# Patient Record
Sex: FEMALE | Race: BLACK OR AFRICAN AMERICAN | Employment: FULL TIME | URBAN - METROPOLITAN AREA
[De-identification: names, ages, dates, MRNs, and addresses within clinical notes are randomized per-mention and may not be internally consistent; named-entity substitution may affect disease eponyms.]

---

## 2019-08-09 ENCOUNTER — APPOINTMENT (OUTPATIENT)
Dept: CT IMAGING | Age: 49
DRG: 065 | End: 2019-08-09
Payer: COMMERCIAL

## 2019-08-09 ENCOUNTER — HOSPITAL ENCOUNTER (INPATIENT)
Age: 49
LOS: 4 days | Discharge: NURSING, REHAB OR LONG-TERM CARE | DRG: 065 | End: 2019-08-13
Attending: EMERGENCY MEDICINE | Admitting: INTERNAL MEDICINE
Payer: COMMERCIAL

## 2019-08-09 DIAGNOSIS — I63.9 CEREBROVASCULAR ACCIDENT (CVA), UNSPECIFIED MECHANISM (HCC): Primary | ICD-10-CM

## 2019-08-09 LAB
ANION GAP SERPL CALCULATED.3IONS-SCNC: 16 MMOL/L (ref 3–16)
APTT: 32.2 SEC (ref 26–36)
BASOPHILS ABSOLUTE: 0 K/UL (ref 0–0.2)
BASOPHILS RELATIVE PERCENT: 0.5 %
BUN BLDV-MCNC: 8 MG/DL (ref 7–20)
CALCIUM SERPL-MCNC: 9.4 MG/DL (ref 8.3–10.6)
CHLORIDE BLD-SCNC: 98 MMOL/L (ref 99–110)
CHP ED QC CHECK: YES
CO2: 22 MMOL/L (ref 21–32)
CREAT SERPL-MCNC: 0.7 MG/DL (ref 0.6–1.1)
EOSINOPHILS ABSOLUTE: 0.1 K/UL (ref 0–0.6)
EOSINOPHILS RELATIVE PERCENT: 1.2 %
GFR AFRICAN AMERICAN: >60
GFR NON-AFRICAN AMERICAN: >60
GLUCOSE BLD-MCNC: 140 MG/DL (ref 70–99)
GLUCOSE BLD-MCNC: 168 MG/DL (ref 70–99)
GONADOTROPIN, CHORIONIC (HCG) QUANT: <5 MIU/ML
HCT VFR BLD CALC: 39.3 % (ref 36–48)
HEMOGLOBIN: 12.4 G/DL (ref 12–16)
INR BLD: 1.02 (ref 0.86–1.14)
LYMPHOCYTES ABSOLUTE: 1 K/UL (ref 1–5.1)
LYMPHOCYTES RELATIVE PERCENT: 14 %
MCH RBC QN AUTO: 25.3 PG (ref 26–34)
MCHC RBC AUTO-ENTMCNC: 31.6 G/DL (ref 31–36)
MCV RBC AUTO: 79.9 FL (ref 80–100)
MONOCYTES ABSOLUTE: 0.3 K/UL (ref 0–1.3)
MONOCYTES RELATIVE PERCENT: 4.6 %
NEUTROPHILS ABSOLUTE: 5.8 K/UL (ref 1.7–7.7)
NEUTROPHILS RELATIVE PERCENT: 79.7 %
PDW BLD-RTO: 16.8 % (ref 12.4–15.4)
PERFORMED ON: ABNORMAL
PLATELET # BLD: 191 K/UL (ref 135–450)
PMV BLD AUTO: 8.1 FL (ref 5–10.5)
POTASSIUM SERPL-SCNC: 3.4 MMOL/L (ref 3.5–5.1)
PROTHROMBIN TIME: 11.6 SEC (ref 9.8–13)
RBC # BLD: 4.92 M/UL (ref 4–5.2)
SODIUM BLD-SCNC: 136 MMOL/L (ref 136–145)
WBC # BLD: 7.3 K/UL (ref 4–11)

## 2019-08-09 PROCEDURE — 36415 COLL VENOUS BLD VENIPUNCTURE: CPT

## 2019-08-09 PROCEDURE — 2060000000 HC ICU INTERMEDIATE R&B

## 2019-08-09 PROCEDURE — 80048 BASIC METABOLIC PNL TOTAL CA: CPT

## 2019-08-09 PROCEDURE — 93005 ELECTROCARDIOGRAM TRACING: CPT | Performed by: EMERGENCY MEDICINE

## 2019-08-09 PROCEDURE — 85610 PROTHROMBIN TIME: CPT

## 2019-08-09 PROCEDURE — 84702 CHORIONIC GONADOTROPIN TEST: CPT

## 2019-08-09 PROCEDURE — 6360000002 HC RX W HCPCS: Performed by: EMERGENCY MEDICINE

## 2019-08-09 PROCEDURE — 70450 CT HEAD/BRAIN W/O DYE: CPT

## 2019-08-09 PROCEDURE — 6370000000 HC RX 637 (ALT 250 FOR IP): Performed by: INTERNAL MEDICINE

## 2019-08-09 PROCEDURE — 96374 THER/PROPH/DIAG INJ IV PUSH: CPT

## 2019-08-09 PROCEDURE — 85730 THROMBOPLASTIN TIME PARTIAL: CPT

## 2019-08-09 PROCEDURE — 2580000003 HC RX 258: Performed by: INTERNAL MEDICINE

## 2019-08-09 PROCEDURE — 6370000000 HC RX 637 (ALT 250 FOR IP): Performed by: EMERGENCY MEDICINE

## 2019-08-09 PROCEDURE — 96375 TX/PRO/DX INJ NEW DRUG ADDON: CPT

## 2019-08-09 PROCEDURE — 2500000003 HC RX 250 WO HCPCS: Performed by: EMERGENCY MEDICINE

## 2019-08-09 PROCEDURE — 70496 CT ANGIOGRAPHY HEAD: CPT

## 2019-08-09 PROCEDURE — 96376 TX/PRO/DX INJ SAME DRUG ADON: CPT

## 2019-08-09 PROCEDURE — 85025 COMPLETE CBC W/AUTO DIFF WBC: CPT

## 2019-08-09 PROCEDURE — 2500000003 HC RX 250 WO HCPCS: Performed by: INTERNAL MEDICINE

## 2019-08-09 PROCEDURE — 6360000004 HC RX CONTRAST MEDICATION: Performed by: EMERGENCY MEDICINE

## 2019-08-09 PROCEDURE — 99285 EMERGENCY DEPT VISIT HI MDM: CPT

## 2019-08-09 RX ORDER — LABETALOL HYDROCHLORIDE 5 MG/ML
2.5 INJECTION, SOLUTION INTRAVENOUS ONCE
Status: COMPLETED | OUTPATIENT
Start: 2019-08-09 | End: 2019-08-09

## 2019-08-09 RX ORDER — ASPIRIN 600 MG/1
300 SUPPOSITORY RECTAL ONCE
Status: COMPLETED | OUTPATIENT
Start: 2019-08-09 | End: 2019-08-09

## 2019-08-09 RX ORDER — ATORVASTATIN CALCIUM 80 MG/1
80 TABLET, FILM COATED ORAL NIGHTLY
Status: DISCONTINUED | OUTPATIENT
Start: 2019-08-09 | End: 2019-08-13 | Stop reason: HOSPADM

## 2019-08-09 RX ORDER — ASPIRIN 81 MG/1
324 TABLET, CHEWABLE ORAL ONCE
Status: DISCONTINUED | OUTPATIENT
Start: 2019-08-09 | End: 2019-08-09

## 2019-08-09 RX ORDER — LABETALOL HYDROCHLORIDE 5 MG/ML
10 INJECTION, SOLUTION INTRAVENOUS EVERY 10 MIN PRN
Status: DISCONTINUED | OUTPATIENT
Start: 2019-08-09 | End: 2019-08-13 | Stop reason: HOSPADM

## 2019-08-09 RX ORDER — ASPIRIN 300 MG/1
300 SUPPOSITORY RECTAL DAILY
Status: DISCONTINUED | OUTPATIENT
Start: 2019-08-10 | End: 2019-08-13 | Stop reason: HOSPADM

## 2019-08-09 RX ORDER — ASPIRIN 81 MG/1
81 TABLET ORAL DAILY
Status: DISCONTINUED | OUTPATIENT
Start: 2019-08-10 | End: 2019-08-13 | Stop reason: HOSPADM

## 2019-08-09 RX ORDER — SODIUM CHLORIDE 0.9 % (FLUSH) 0.9 %
10 SYRINGE (ML) INJECTION EVERY 12 HOURS SCHEDULED
Status: DISCONTINUED | OUTPATIENT
Start: 2019-08-09 | End: 2019-08-13 | Stop reason: HOSPADM

## 2019-08-09 RX ORDER — SODIUM CHLORIDE 0.9 % (FLUSH) 0.9 %
10 SYRINGE (ML) INJECTION PRN
Status: DISCONTINUED | OUTPATIENT
Start: 2019-08-09 | End: 2019-08-13 | Stop reason: HOSPADM

## 2019-08-09 RX ORDER — METHYLPREDNISOLONE SODIUM SUCCINATE 125 MG/2ML
125 INJECTION, POWDER, LYOPHILIZED, FOR SOLUTION INTRAMUSCULAR; INTRAVENOUS ONCE
Status: COMPLETED | OUTPATIENT
Start: 2019-08-09 | End: 2019-08-09

## 2019-08-09 RX ORDER — DIPHENHYDRAMINE HYDROCHLORIDE 50 MG/ML
50 INJECTION INTRAMUSCULAR; INTRAVENOUS ONCE
Status: COMPLETED | OUTPATIENT
Start: 2019-08-09 | End: 2019-08-09

## 2019-08-09 RX ORDER — ONDANSETRON 2 MG/ML
4 INJECTION INTRAMUSCULAR; INTRAVENOUS EVERY 6 HOURS PRN
Status: DISCONTINUED | OUTPATIENT
Start: 2019-08-09 | End: 2019-08-13 | Stop reason: HOSPADM

## 2019-08-09 RX ADMIN — ATORVASTATIN CALCIUM 80 MG: 80 TABLET, FILM COATED ORAL at 22:21

## 2019-08-09 RX ADMIN — SODIUM CHLORIDE, PRESERVATIVE FREE 10 ML: 5 INJECTION INTRAVENOUS at 21:48

## 2019-08-09 RX ADMIN — LABETALOL HYDROCHLORIDE 10 MG: 5 INJECTION INTRAVENOUS at 21:46

## 2019-08-09 RX ADMIN — DIPHENHYDRAMINE HYDROCHLORIDE 50 MG: 50 INJECTION, SOLUTION INTRAMUSCULAR; INTRAVENOUS at 19:30

## 2019-08-09 RX ADMIN — ASPIRIN 300 MG: 600 SUPPOSITORY RECTAL at 20:19

## 2019-08-09 RX ADMIN — LABETALOL HYDROCHLORIDE 10 MG: 5 INJECTION INTRAVENOUS at 22:39

## 2019-08-09 RX ADMIN — Medication 20 MG: at 19:30

## 2019-08-09 RX ADMIN — LABETALOL HYDROCHLORIDE 2.5 MG: 5 INJECTION, SOLUTION INTRAVENOUS at 20:59

## 2019-08-09 RX ADMIN — METHYLPREDNISOLONE SODIUM SUCCINATE 125 MG: 125 INJECTION, POWDER, FOR SOLUTION INTRAMUSCULAR; INTRAVENOUS at 19:30

## 2019-08-09 RX ADMIN — IOPAMIDOL 75 ML: 755 INJECTION, SOLUTION INTRAVENOUS at 19:39

## 2019-08-09 RX ADMIN — LABETALOL HYDROCHLORIDE 2.5 MG: 5 INJECTION, SOLUTION INTRAVENOUS at 20:20

## 2019-08-09 ASSESSMENT — PAIN SCALES - GENERAL
PAINLEVEL_OUTOF10: 0

## 2019-08-09 NOTE — ED PROVIDER NOTES
lateral ventricle and communicating with  the suprasellar cistern, compatible with either remote ischemia or with  congenital porencephaly.  No acute loss of the gray-white matter  differentiation is identified to suggest acute or subacute infarct.  No  masses or hemorrhages within the brain parenchyma are found. No evidence of midline shift.  Cavum septum pellucidum et vergae noted.  The  intracranial vasculature, including the dural venous sinuses, is within  normal limits. ORBITS: The visualized portion of the orbits demonstrate no acute abnormality. SINUSES: The visualized paranasal sinuses and mastoid air cells demonstrate  no acute abnormality. SOFT TISSUES/SKULL:  No acute abnormality of the visualized skull or soft  tissues.                      EKG (if obtained): (All EKG's are interpreted by myself in the absence of a cardiologist)  Initial EKG on my interpretation shows normal sinus rhythm with PVCs at a rate of 87 bpm, no CADE    MDM:  Differential diagnosis: CVA, TIA, hypertensive emergency    The patient's blood glucose is normal.  She is markedly hypertensive with a BP over 226 systolic. I discussed the case with  Stroke Dr. Tiffanie Lomas who states that she is not a TPA candidate as she arrives almost 9 hours from time last confirmed to be normal which was when she went to bed 9 AM.    The patient is allergic to shellfish and initially refused IV contrast however as I told her the initial noncontrast CT did not show a bleed and would like to further look into possibility of a stroke she agreed to undergo such after being pretreated with Pepcid, Benadryl, Solu-Medrol after discussion regarding the risk/benefit of contrast in terms of allergy and to renal function. CT and CTA showed no acute abnormality. The patient was given per rectal aspirin along with started on IV labetalol, as her blood pressures over 200, and will admit here as there is no indication for UC transfer.   Patient is

## 2019-08-10 ENCOUNTER — APPOINTMENT (OUTPATIENT)
Dept: MRI IMAGING | Age: 49
DRG: 065 | End: 2019-08-10
Payer: COMMERCIAL

## 2019-08-10 LAB
CHOLESTEROL, TOTAL: 280 MG/DL (ref 0–199)
EKG ATRIAL RATE: 87 BPM
EKG DIAGNOSIS: NORMAL
EKG P AXIS: 54 DEGREES
EKG P-R INTERVAL: 164 MS
EKG Q-T INTERVAL: 400 MS
EKG QRS DURATION: 102 MS
EKG QTC CALCULATION (BAZETT): 481 MS
EKG R AXIS: -17 DEGREES
EKG T AXIS: 18 DEGREES
EKG VENTRICULAR RATE: 87 BPM
ESTIMATED AVERAGE GLUCOSE: 116.9 MG/DL
HBA1C MFR BLD: 5.7 %
HCT VFR BLD CALC: 38.4 % (ref 36–48)
HDLC SERPL-MCNC: 74 MG/DL (ref 40–60)
HEMOGLOBIN: 12.6 G/DL (ref 12–16)
LDL CHOLESTEROL CALCULATED: 195 MG/DL
MCH RBC QN AUTO: 25.4 PG (ref 26–34)
MCHC RBC AUTO-ENTMCNC: 32.9 G/DL (ref 31–36)
MCV RBC AUTO: 77.2 FL (ref 80–100)
PDW BLD-RTO: 17 % (ref 12.4–15.4)
PLATELET # BLD: 308 K/UL (ref 135–450)
PMV BLD AUTO: 8.3 FL (ref 5–10.5)
RBC # BLD: 4.97 M/UL (ref 4–5.2)
TRIGL SERPL-MCNC: 55 MG/DL (ref 0–150)
VLDLC SERPL CALC-MCNC: 11 MG/DL
WBC # BLD: 6.4 K/UL (ref 4–11)

## 2019-08-10 PROCEDURE — 85027 COMPLETE CBC AUTOMATED: CPT

## 2019-08-10 PROCEDURE — 70551 MRI BRAIN STEM W/O DYE: CPT

## 2019-08-10 PROCEDURE — 6370000000 HC RX 637 (ALT 250 FOR IP): Performed by: NURSE PRACTITIONER

## 2019-08-10 PROCEDURE — 36415 COLL VENOUS BLD VENIPUNCTURE: CPT

## 2019-08-10 PROCEDURE — 80061 LIPID PANEL: CPT

## 2019-08-10 PROCEDURE — 93010 ELECTROCARDIOGRAM REPORT: CPT | Performed by: INTERNAL MEDICINE

## 2019-08-10 PROCEDURE — 94760 N-INVAS EAR/PLS OXIMETRY 1: CPT

## 2019-08-10 PROCEDURE — 6360000002 HC RX W HCPCS: Performed by: INTERNAL MEDICINE

## 2019-08-10 PROCEDURE — 97166 OT EVAL MOD COMPLEX 45 MIN: CPT

## 2019-08-10 PROCEDURE — 97530 THERAPEUTIC ACTIVITIES: CPT

## 2019-08-10 PROCEDURE — 83036 HEMOGLOBIN GLYCOSYLATED A1C: CPT

## 2019-08-10 PROCEDURE — 2580000003 HC RX 258: Performed by: INTERNAL MEDICINE

## 2019-08-10 PROCEDURE — 2500000003 HC RX 250 WO HCPCS: Performed by: INTERNAL MEDICINE

## 2019-08-10 PROCEDURE — 6370000000 HC RX 637 (ALT 250 FOR IP): Performed by: INTERNAL MEDICINE

## 2019-08-10 PROCEDURE — 97535 SELF CARE MNGMENT TRAINING: CPT

## 2019-08-10 PROCEDURE — 97163 PT EVAL HIGH COMPLEX 45 MIN: CPT | Performed by: PHYSICAL THERAPIST

## 2019-08-10 PROCEDURE — 97530 THERAPEUTIC ACTIVITIES: CPT | Performed by: PHYSICAL THERAPIST

## 2019-08-10 PROCEDURE — 2060000000 HC ICU INTERMEDIATE R&B

## 2019-08-10 RX ORDER — CLOPIDOGREL BISULFATE 75 MG/1
75 TABLET ORAL DAILY
Status: DISCONTINUED | OUTPATIENT
Start: 2019-08-10 | End: 2019-08-13 | Stop reason: HOSPADM

## 2019-08-10 RX ORDER — LORAZEPAM 1 MG/1
1 TABLET ORAL ONCE
Status: COMPLETED | OUTPATIENT
Start: 2019-08-10 | End: 2019-08-10

## 2019-08-10 RX ADMIN — SODIUM CHLORIDE, PRESERVATIVE FREE 10 ML: 5 INJECTION INTRAVENOUS at 08:14

## 2019-08-10 RX ADMIN — LORAZEPAM 1 MG: 1 TABLET ORAL at 16:11

## 2019-08-10 RX ADMIN — CLOPIDOGREL BISULFATE 75 MG: 75 TABLET ORAL at 12:50

## 2019-08-10 RX ADMIN — ASPIRIN 81 MG: 81 TABLET ORAL at 08:14

## 2019-08-10 RX ADMIN — LABETALOL HYDROCHLORIDE 10 MG: 5 INJECTION INTRAVENOUS at 08:14

## 2019-08-10 RX ADMIN — LABETALOL HYDROCHLORIDE 10 MG: 5 INJECTION INTRAVENOUS at 04:32

## 2019-08-10 RX ADMIN — ATORVASTATIN CALCIUM 80 MG: 80 TABLET, FILM COATED ORAL at 21:38

## 2019-08-10 RX ADMIN — ENOXAPARIN SODIUM 40 MG: 40 INJECTION SUBCUTANEOUS at 08:13

## 2019-08-10 RX ADMIN — SODIUM CHLORIDE, PRESERVATIVE FREE 10 ML: 5 INJECTION INTRAVENOUS at 21:38

## 2019-08-10 ASSESSMENT — PAIN SCALES - GENERAL: PAINLEVEL_OUTOF10: 0

## 2019-08-10 NOTE — PROGRESS NOTES
Physical Therapy    Facility/Department: [facility] PROGRESSIVE CARE  Initial Assessment    NAME: Piotr Sutton  : 1970  MRN: [id number]    Date of Service: 8/10/2019    Discharge Recommendations:  Patient would benefit from continued therapy after discharge, 5-7 sessions per week   PT Equipment Recommendations  Other: will continue to assess  Piotr Sutton scored a 9/24 on the AM-PAC short mobility form. Current research shows that an AM-PAC score of 17 or less is typically not associated with a discharge to the patient's home setting. Based on the patients AM-PAC score and their current functional mobility deficits, it is recommended that the patient have 5-7 sessions per week of Physical Therapy at d/c to increase the patients independence. Assessment   Body structures, Functions, Activity limitations: Decreased functional mobility   Assessment: pt is a 49 yo female who was adm to hosp with L sided weakness/parathesia; MRI pending; pt is well below her baseline of being Ind without an AD and working full time; pt currently is needing mod A of 2 for mobility tasks and needing pedrito stedy lift for transfers; feel pt will need extensive therapy at discharge to address her deficits to allow pt to return home at discharge  Prognosis: Fair;Good  Decision Making: High Complexity  PT Education: PT Role;General Safety  Barriers to Learning: very impulsive  REQUIRES PT FOLLOW UP: Yes  Activity Tolerance  Activity Tolerance: Patient Tolerated treatment well       Patient Diagnosis(es): The encounter diagnosis was Cerebrovascular accident (CVA), unspecified mechanism ([facility] [address] has a past medical history of Hypertension. has a past surgical history that includes  section.     Restrictions  Restrictions/Precautions  Restrictions/Precautions: Fall Risk  Position Activity Restriction  Other position/activity restrictions: pt very impulsive  Vision/Hearing  Vision: Within Functional Limits  Hearing: Within

## 2019-08-10 NOTE — PROGRESS NOTES
apparent distress. Laying flat  HEENT: Normocephalic. Atraumatic. Pupils equal and reactive. EOM intact. Oral mucosa pink/moist/intact. Left sided neglect  Neck: Supple. Symmetrical. Trachea midline. Lungs: Clear to auscultation bilaterally. Respirations even and unlabored. Chest: Exam unremarkable. Cardiac: S1/S2 noted. Regular Rhythm and rate. Abdomen/GI: Soft. Non-tender. Non-distended. BS+. Extremities: PP+. Atraumatic. No redness/cyanosis/edema noted. Brisk cap refill. Skin: Dry and intact. No lesions noted. Neuro: Grossly intact. No focal deficits noted. NIH 9, weakness in the RUE, 4/5 strength in RLE    LABS:    Lab Results   Component Value Date    WBC 6.4 08/10/2019    HGB 12.6 08/10/2019    HCT 38.4 08/10/2019    MCV 77.2 (L) 08/10/2019     08/10/2019    LYMPHOPCT 14.0 08/09/2019    RBC 4.97 08/10/2019    MCH 25.4 (L) 08/10/2019    MCHC 32.9 08/10/2019    RDW 17.0 (H) 08/10/2019       Lab Results   Component Value Date    CREATININE 0.7 08/09/2019    BUN 8 08/09/2019     08/09/2019    K 3.4 (L) 08/09/2019    CL 98 (L) 08/09/2019    CO2 22 08/09/2019       No results found for: MG    No results found for: ALT, AST, GGT, ALKPHOS, BILITOT     No flowsheet data found. Imaging:  MRI BRAIN WO CONTRAST   Final Result   Acute infarct within the right mid corona radiata. The findings were sent to the Radiology Results [address] at 5:07   pm on 8/10/2019to be communicated to a licensed caregiver. CTA HEAD NECK W CONTRAST   Final Result   Mild plaque both proximal ICAs. Otherwise no hemodynamic stenosis or   occlusion identified per NASCET criteria. No evidence of hemodynamically significant stenosis or occlusion of   intracranial circulation. CT HEAD WO CONTRAST   Final Result   No acute intracranial abnormality. Findings were discussed with Carlyle Pete at 7:20 pm on 8/9/2019.              Assessment & Plan:      Acute infarct within the right

## 2019-08-11 PROCEDURE — 2060000000 HC ICU INTERMEDIATE R&B

## 2019-08-11 PROCEDURE — 6360000002 HC RX W HCPCS: Performed by: INTERNAL MEDICINE

## 2019-08-11 PROCEDURE — 2500000003 HC RX 250 WO HCPCS: Performed by: NURSE PRACTITIONER

## 2019-08-11 PROCEDURE — 6370000000 HC RX 637 (ALT 250 FOR IP): Performed by: INTERNAL MEDICINE

## 2019-08-11 PROCEDURE — 2580000003 HC RX 258: Performed by: INTERNAL MEDICINE

## 2019-08-11 PROCEDURE — 6370000000 HC RX 637 (ALT 250 FOR IP): Performed by: NURSE PRACTITIONER

## 2019-08-11 PROCEDURE — 94760 N-INVAS EAR/PLS OXIMETRY 1: CPT

## 2019-08-11 PROCEDURE — 92610 EVALUATE SWALLOWING FUNCTION: CPT

## 2019-08-11 RX ORDER — ACETAMINOPHEN 325 MG/1
650 TABLET ORAL EVERY 4 HOURS PRN
Status: DISCONTINUED | OUTPATIENT
Start: 2019-08-11 | End: 2019-08-13 | Stop reason: HOSPADM

## 2019-08-11 RX ORDER — AMLODIPINE BESYLATE 5 MG/1
5 TABLET ORAL DAILY
Status: DISCONTINUED | OUTPATIENT
Start: 2019-08-11 | End: 2019-08-13 | Stop reason: HOSPADM

## 2019-08-11 RX ADMIN — AMLODIPINE BESYLATE 5 MG: 5 TABLET ORAL at 17:44

## 2019-08-11 RX ADMIN — SODIUM CHLORIDE, PRESERVATIVE FREE 10 ML: 5 INJECTION INTRAVENOUS at 10:16

## 2019-08-11 RX ADMIN — SODIUM CHLORIDE, PRESERVATIVE FREE 10 ML: 5 INJECTION INTRAVENOUS at 20:20

## 2019-08-11 RX ADMIN — LABETALOL HYDROCHLORIDE 10 MG: 5 INJECTION INTRAVENOUS at 15:03

## 2019-08-11 RX ADMIN — ATORVASTATIN CALCIUM 80 MG: 80 TABLET, FILM COATED ORAL at 20:19

## 2019-08-11 RX ADMIN — ENOXAPARIN SODIUM 40 MG: 40 INJECTION SUBCUTANEOUS at 10:16

## 2019-08-11 RX ADMIN — LABETALOL HYDROCHLORIDE 10 MG: 5 INJECTION INTRAVENOUS at 13:31

## 2019-08-11 RX ADMIN — ASPIRIN 81 MG: 81 TABLET ORAL at 10:16

## 2019-08-11 RX ADMIN — CLOPIDOGREL BISULFATE 75 MG: 75 TABLET ORAL at 10:16

## 2019-08-11 RX ADMIN — LABETALOL HYDROCHLORIDE 10 MG: 5 INJECTION INTRAVENOUS at 04:54

## 2019-08-11 NOTE — PROGRESS NOTES
Hospital Medicine Progress Note      Admit Date: 2019         Overnight Events: none    CC: F/U for left sided weakness and dizziness    HPI:   This is a 42-year-old female with a history of hypertension presented to the ED with complaints of left-sided weakness and facial droop. CT head performed and negative. CTA without acute findings. MRI positive for Acute infarct within the right mid corona radiata. She is clear signs of left-sided neglect and difficulty moving her left arm. Neurology was consulted. Interval History/Subjective:   Planes of feeling very fatigued, denies fevers, chills, chest pain or shortness of breath. No nausea or vomiting. No dysuria. No other symptoms noted at present. Review of Systems:     Comprehensive ROS negative except as mentioned above. Past Medical History:        Diagnosis Date    Hypertension        Past Surgical History:        Procedure Laterality Date     SECTION      x3       Allergies:  Shellfish-derived products; Cephalexin; Codeine; Erythromycin; and Pcn [penicillins]    Past medical and surgical history reviewed. Any changes have been noted.      Scheduled and prn Medications:    Scheduled Meds:   amLODIPine  5 mg Oral Daily    clopidogrel  75 mg Oral Daily    sodium chloride flush  10 mL Intravenous 2 times per day    enoxaparin  40 mg Subcutaneous Daily    aspirin  81 mg Oral Daily    Or    aspirin  300 mg Rectal Daily    atorvastatin  80 mg Oral Nightly     Continuous Infusions:  PRN Meds:.sodium chloride flush, magnesium hydroxide, ondansetron, labetalol    PHYSICAL EXAM:  BP (!) 181/104   Pulse 61   Temp 98.4 °F (36.9 °C) (Oral)   Resp 17   Ht 5' 7\" (1.702 m)   Wt 233 lb 4 oz (105.8 kg)   LMP 2019   SpO2 96%   BMI 36.53 kg/m²       Intake/Output Summary (Last 24 hours) at 2019 6134  Last data filed at 2019 0445  Gross per 24 hour   Intake --   Output 250 ml   Net -250 ml       General: Alert and

## 2019-08-11 NOTE — PROGRESS NOTES
Speech Language Pathology  Facility/Department: [address] PROGRESSIVE CARE   CLINICAL BEDSIDE SWALLOW EVALUATION    NAME: Bull Rosenberg  : 1970  MRN: [id number]    ADMISSION DATE: 2019  ADMITTING DIAGNOSIS: has Acute ischemic stroke [facility]) on their problem list.  ONSET DATE: 2019    Recent Chest Xray/CT of Chest: none this admission  MRI of Brain:  8/10/2019:  Acute infarct within the right mid corona radiata. Date of Eval: 2019  Evaluating Therapist:   Hai Monahan MS CCC/SLP [id number]  Speech Language Pathologist      Current Diet level:  Current Diet : Regular  Current Liquid Diet : Thin      Primary Complaint  Patient Complaint: Pt says she does well with pills. Pt c/o biting her zaida and food \"sicking\" orally and occasionally pharyngeally. Pain:  Pain Assessment  Pain Assessment: 0-10  Pain Level: 0    Reason for Referral  Bull Rosenberg was referred for a bedside swallow evaluation to assess the efficiency of her swallow function, identify signs and symptoms of aspiration and make recommendations regarding safe dietary consistencies, effective compensatory strategies, and safe eating environment. 2019:  "[employer]:  Bull Rosenberg is a 50 y.o. female that presents for evaluation of right-sided facial droop, change in speech pattern and bilateral upper and lower extremity paresthesia. The patient works third shift and went to sleep at 9 AM in her usual state of health. She woke up around noon and at that time was appreciating change in speech and facial droop along with paresthesia. She denies chest pain, headache, fever, chills. She was stroke alerted upon arrival.     Impression  Dysphagia Diagnosis: Mild to moderate oral stage dysphagia;Mild pharyngeal stage dysphagia  Dysphagia Impression : Pt with no signs of aspiration or penetration. Pt deficits appear to be able to be compensated for with strategies.   Oral phase issues include occasional anterior loss of liquid, oral residue intermittent and biting her tongue. Pharyngeal issue inlcudes sensation of \"sticking\"  All issues are solved with small bites and siips, alternate solid and liquid. Pt advised to order 1 soft item per meal to allow for ease of intake and assist in keeping intake up. Dysphagia Outcome Severity Scale: Level 5: Mild dysphagia- Distant supervision. May need one diet consistency restricted     Treatment Plan  Requires SLP Intervention: Yes  Duration/Frequency of Treatment: 3-5x/week while on acute  D/C Recommendations: To be determined       Recommended Diet and Intervention  Diet Solids Recommendation: Regular  Liquid Consistency Recommendation: Thin  Recommended Form of Meds: Whole with water     Therapeutic Interventions: Patient/Family education;Diet tolerance monitoring;Effortful swallow;Oral motor exercises; Pharyngeal exercises; Laryngeal exercises    Compensatory Swallowing Strategies  Compensatory Swallowing Strategies: Alternate solids and liquids;Small bites/sips; Check for pocketing of food on the Left;Upright as possible for all oral intake;Remain upright for 30-45 minutes after meals    Treatment/Goals  Short-term Goals  Timeframe for Short-term Goals: 7-10 days  Dysphagia Goals: The patient will tolerate recommended diet without observed clinical signs of aspiration; The patient/caregiver will demonstrate understanding of compensatory strategies for improved swallowing safety. ;The patient will improve oral motor function via therapeutic oral motor exercises to 5/5 each trial.;The patient will recall and perform compensatory strategies, with no cues. ;The patient will tolerate regular consistency solids 10/10. General  Chart Reviewed: Yes  Subjective  Subjective: Alert and cooperative, but c/o fatigue. Behavior/Cognition: Alert; Cooperative  Respiratory Status: Room air  O2 Device: None (Room air)  Communication Observation: Functional  Follows Directions: Simple  Dentition: Adequate  Patient

## 2019-08-12 LAB
ALBUMIN SERPL-MCNC: 3.8 G/DL (ref 3.4–5)
ANION GAP SERPL CALCULATED.3IONS-SCNC: 12 MMOL/L (ref 3–16)
BUN BLDV-MCNC: 8 MG/DL (ref 7–20)
CALCIUM SERPL-MCNC: 9.3 MG/DL (ref 8.3–10.6)
CHLORIDE BLD-SCNC: 100 MMOL/L (ref 99–110)
CO2: 27 MMOL/L (ref 21–32)
CREAT SERPL-MCNC: 0.7 MG/DL (ref 0.6–1.1)
GFR AFRICAN AMERICAN: >60
GFR NON-AFRICAN AMERICAN: >60
GLUCOSE BLD-MCNC: 110 MG/DL (ref 70–99)
LV EF: 55 %
LVEF MODALITY: NORMAL
PHOSPHORUS: 2.9 MG/DL (ref 2.5–4.9)
POTASSIUM SERPL-SCNC: 3.6 MMOL/L (ref 3.5–5.1)
SODIUM BLD-SCNC: 139 MMOL/L (ref 136–145)

## 2019-08-12 PROCEDURE — 2580000003 HC RX 258: Performed by: INTERNAL MEDICINE

## 2019-08-12 PROCEDURE — 92523 SPEECH SOUND LANG COMPREHEN: CPT

## 2019-08-12 PROCEDURE — 92507 TX SP LANG VOICE COMM INDIV: CPT

## 2019-08-12 PROCEDURE — 80069 RENAL FUNCTION PANEL: CPT

## 2019-08-12 PROCEDURE — 94760 N-INVAS EAR/PLS OXIMETRY 1: CPT

## 2019-08-12 PROCEDURE — 6370000000 HC RX 637 (ALT 250 FOR IP): Performed by: NURSE PRACTITIONER

## 2019-08-12 PROCEDURE — 93306 TTE W/DOPPLER COMPLETE: CPT

## 2019-08-12 PROCEDURE — 36415 COLL VENOUS BLD VENIPUNCTURE: CPT

## 2019-08-12 PROCEDURE — 6370000000 HC RX 637 (ALT 250 FOR IP): Performed by: HOSPITALIST

## 2019-08-12 PROCEDURE — 97116 GAIT TRAINING THERAPY: CPT | Performed by: PHYSICAL THERAPIST

## 2019-08-12 PROCEDURE — 6370000000 HC RX 637 (ALT 250 FOR IP): Performed by: INTERNAL MEDICINE

## 2019-08-12 PROCEDURE — 97112 NEUROMUSCULAR REEDUCATION: CPT

## 2019-08-12 PROCEDURE — 2060000000 HC ICU INTERMEDIATE R&B

## 2019-08-12 PROCEDURE — 2500000003 HC RX 250 WO HCPCS: Performed by: NURSE PRACTITIONER

## 2019-08-12 PROCEDURE — 6360000002 HC RX W HCPCS: Performed by: INTERNAL MEDICINE

## 2019-08-12 PROCEDURE — 97535 SELF CARE MNGMENT TRAINING: CPT

## 2019-08-12 PROCEDURE — 97530 THERAPEUTIC ACTIVITIES: CPT | Performed by: PHYSICAL THERAPIST

## 2019-08-12 PROCEDURE — 97530 THERAPEUTIC ACTIVITIES: CPT

## 2019-08-12 RX ORDER — CALCIUM CARBONATE 200(500)MG
500 TABLET,CHEWABLE ORAL 3 TIMES DAILY PRN
Status: DISCONTINUED | OUTPATIENT
Start: 2019-08-12 | End: 2019-08-13 | Stop reason: HOSPADM

## 2019-08-12 RX ORDER — PANTOPRAZOLE SODIUM 40 MG/1
40 TABLET, DELAYED RELEASE ORAL
Status: DISCONTINUED | OUTPATIENT
Start: 2019-08-13 | End: 2019-08-12

## 2019-08-12 RX ORDER — PANTOPRAZOLE SODIUM 40 MG/1
40 TABLET, DELAYED RELEASE ORAL
Status: DISCONTINUED | OUTPATIENT
Start: 2019-08-12 | End: 2019-08-13 | Stop reason: HOSPADM

## 2019-08-12 RX ADMIN — PANTOPRAZOLE SODIUM 40 MG: 40 TABLET, DELAYED RELEASE ORAL at 20:22

## 2019-08-12 RX ADMIN — CLOPIDOGREL BISULFATE 75 MG: 75 TABLET ORAL at 08:46

## 2019-08-12 RX ADMIN — LABETALOL HYDROCHLORIDE 10 MG: 5 INJECTION INTRAVENOUS at 03:28

## 2019-08-12 RX ADMIN — ASPIRIN 81 MG: 81 TABLET ORAL at 08:46

## 2019-08-12 RX ADMIN — ATORVASTATIN CALCIUM 80 MG: 80 TABLET, FILM COATED ORAL at 20:22

## 2019-08-12 RX ADMIN — SODIUM CHLORIDE, PRESERVATIVE FREE 10 ML: 5 INJECTION INTRAVENOUS at 10:33

## 2019-08-12 RX ADMIN — SODIUM CHLORIDE, PRESERVATIVE FREE 10 ML: 5 INJECTION INTRAVENOUS at 20:24

## 2019-08-12 RX ADMIN — LABETALOL HYDROCHLORIDE 10 MG: 5 INJECTION INTRAVENOUS at 19:21

## 2019-08-12 RX ADMIN — ANTACID TABLETS 500 MG: 500 TABLET, CHEWABLE ORAL at 20:22

## 2019-08-12 RX ADMIN — ENOXAPARIN SODIUM 40 MG: 40 INJECTION SUBCUTANEOUS at 08:46

## 2019-08-12 RX ADMIN — AMLODIPINE BESYLATE 5 MG: 5 TABLET ORAL at 08:46

## 2019-08-12 ASSESSMENT — PAIN SCALES - GENERAL
PAINLEVEL_OUTOF10: 0

## 2019-08-12 NOTE — CARE COORDINATION
Case Management:  Discussed with patient and family present in room about therapy recommendations for a stay in IP Rehab. Patient is agreeable and did request rehab here at [facility], informed patient that we are not in network with her insurance. Discussed list of area rehab units and patient tells cm she would like to think about it and review the list tonight.   Electronically signed by Charmayne China on 8/12/2019 at 6:00 [id number]

## 2019-08-12 NOTE — PLAN OF CARE
Problem: Falls - Risk of:  Goal: Will remain free from falls  Description  Will remain free from falls  8/12/2019 1530 by Trav Gerardo RN  Outcome: Ongoing  Note:   Pt remains free from falls. Problem: Falls - Risk of:  Goal: Absence of physical injury  Description  Absence of physical injury  8/12/2019 1530 by Trav Gerardo RN  Outcome: Ongoing  Note:   Pt absent of physical injury. Problem: HEMODYNAMIC STATUS  Goal: Patient has stable vital signs and fluid balance  8/12/2019 1530 by Trav Gerardo RN  Outcome: Ongoing  Note:   Pt has stable vitals. Monitoring BP. Problem: ACTIVITY INTOLERANCE/IMPAIRED MOBILITY  Goal: Mobility/activity is maintained at optimum level for patient  8/12/2019 1530 by Trav Gerardo RN  Outcome: Ongoing  Note:   Mobility is maintained for this pt. Problem: COMMUNICATION IMPAIRMENT  Goal: Ability to express needs and understand communication  8/12/2019 1530 by Trav Gerardo RN  Outcome: Ongoing  Note:   Pt is able to express concerns and communicate . Problem: Risk for Impaired Skin Integrity  Goal: Tissue integrity - skin and mucous membranes  Description  Structural intactness and normal physiological function of skin and  mucous membranes. 8/12/2019 1530 by Tarv Gerardo RN  Outcome: Ongoing  Note:   Pt being turned for comfort.

## 2019-08-12 NOTE — PROGRESS NOTES
arrival to room with PT, agreeable to therapy, request commode      Orientation  Orientation  Overall Orientation Status: Within Functional Limits  Objective    ADL  Toileting: Maximum assistance(assist for clothing management and dorothy care)        Balance  Sitting Balance: Contact guard assistance(seated on edge of bed as well as on pedrito stedy seat)  Standing Balance: Minimal assistance(Min A of 2)  Standing Balance  Activity: Static standing in pedrito stedy for ADL tasks   Functional Mobility  Functional - Mobility Device: Hemiwalker  Activity: Other  Assist Level: Minimal assistance(Min A of 2)  Functional Mobility Comments: Functional mobility with federico walker with Min A of 2, mod cues for gait sequence and left neglect   Toilet Transfers  Equipment Used: Grab bars  Toilet Transfer: Dependent/Total  Toilet Transfers Comments: pedrito stedy for transfer to toilet   Bed mobility  Supine to Sit: Minimal assistance(HOB elevated)  Sit to Supine: Unable to assess(up in chair at end of session)  Transfers  Sit to stand: Minimal assistance;2 Person assistance  Stand to sit: Minimal assistance;2 Person assistance  Transfer Comments: Transfer to recliner chair per pedrito stedy, Transfer to recliner chair as well per federico cane with Min A of 2, cues for safety and hand placement            Neuromuscular Education  Neuromuscular education: Yes  NDT Treatment: Upper extremity  Functional Movement Patterns: Seated in recliner chair toleratred PROM/AAROM with left UE. Noted trace movement in elbow and fingers  Weight Bearing  Weight Bearing Technique: Yes  Response To Weight Bearing Technique: tolerated weight bearing in hand on pedrito stedy bar, elbow during mobility      Cognition  Overall Cognitive Status: Exceptions  Following Commands:  Follows one step commands consistently  Attention Span: Appears intact  Memory: Decreased recall of precautions  Safety Judgement: Decreased awareness of need for safety  Insights: Decreased awareness of deficits  Initiation: Requires cues for some  Sequencing: Requires cues for some  Cognition Comment: Need to further assess     Perception  Overall Perceptual Status: Impaired  Unilateral Attention: Cues to attend left visual field;Cues to attend to left side of body           Upper Extremity Function  NDT Treatment: Upper extremity        Plan   Plan  Times per week: 3-5  Times per day: Daily  Current Treatment Recommendations: Strengthening, ROM, Balance Training, Functional Mobility Training, Endurance Training, Neuromuscular Re-education, Equipment Evaluation, Education, & procurement, Self-Care / ADL, Patient/Caregiver Education & Training, Safety Education & Training, Positioning    AM-PAC Score        AM-PAC Inpatient Daily Activity Raw Score: 13 (08/12/19 1140)  AM-PAC Inpatient ADL T-Scale Score : 32.03 (08/12/19 1140)  ADL Inpatient CMS 0-100% Score: 63.03 (08/12/19 1140)  ADL Inpatient CMS G-Code Modifier : CL (08/12/19 1140)    Goals  Short term goals  Time Frame for Short term goals: Prior to dc: All goals ongoing   Short term goal 1: Pt will complete fxl transfers with min A   Short term goal 2: Pt will complete grooming with CGA   Short term goal 3: Pt will bathe and dress UB with min A, LB with mod A   Short term goal 4: Pt will toilet with mod A   Short term goal 5: Pt will increase ROM, strength, and fxl use of L UE   Long term goals  Time Frame for Long term goals : TBD by dc site   Patient Goals   Patient goals :  To get back to normal        Therapy Time   Individual Concurrent Group Co-treatment   Time In 1050         Time Out 1130         Minutes 40                 Electronically signed by Alessandro Mathew [id number] on 8/12/2019 at 11:59 AM     If discharged prior to next OT session please see last daily note for discharge status

## 2019-08-12 NOTE — PROGRESS NOTES
Speech Language Pathology  Facility/Department: [facility] PROGRESSIVE CARE  Initial Speech/Language/Cognitive Assessment    NAME: Surya Duggan  : 1970   MRN: [id number]  ADMISSION DATE: 2019  ADMITTING DIAGNOSIS: has Acute ischemic stroke [facility]) on their problem list.  DATE ONSET:     Date of Eval: 2019   Evaluating Therapist: Christie Jackson, SLP     H&P: per doctor  Dominga Brown a 50 y. o. female that presents for evaluation of right-sided facial droop, change in speech pattern and bilateral upper and lower extremity paresthesia.  The patient works third shift and went to sleep at 9 AM in her usual state of health.  She woke up around noon and at that time was appreciating change in speech and facial droop along with paresthesia.  She denies chest pain, headache, fever, chills.  She was stroke alerted upon arrival. \"    RECENT RESULTS MRI of Brain 8/10/19:   Impression   Acute infarct within the right mid corona radiata. Primary Complaint: some reduced attention to L and slurred speech     Pain:  Pain Assessment  Pain Assessment: 0-10  Pain Level: 0    Assessment:  Cognitive Diagnosis: Trace deficits re: attention to L for clock drawing task and visuospacial tasks. Pt scored 28/30 on MOCA. Executive function, alternating attention, naming, memory, attention, calculations, language, thought organization, and orientation skills were WNLs. Aphasia Diagnosis: WFLs  Speech Diagnosis: Mild dysarthria impacted by L perioral weakness; however speech intelligibility is >90% during conversation. Communication Diagnosis: WF     Recommendations:  Requires SLP Intervention: Yes  Duration/Frequency of Treatment: 3-5x/week while on acute; 15-30 min sessions  D/C Recommendations: Inpatient rehabilitation    Plan:   Goals:  Short-term Goals  Timeframe for Short-term Goals: The pt wants to improve speech as she says it sounds like she has been drinking.   Goal 1: The pt will complete oral motor exercises

## 2019-08-12 NOTE — PROGRESS NOTES
2975)  Mobility Inpatient CMS 0-100% Score: 61.29 (08/12/19 1325)  Mobility Inpatient CMS G-Code Modifier : CL (08/12/19 1325)          Goals  Short term goals  Time Frame for Short term goals: by discharge - all goals ongoing 8-12  Short term goal 1: bed mob SBA  Short term goal 2: transfers CGA  Short term goal 3: amb 48' with LRAD min A  Short term goal 4: navigate stairs when able  Patient Goals   Patient goals : to get back to \"normal\"    Plan    Plan  Times per week: 3-5  Current Treatment Recommendations: Functional Mobility Training  Safety Devices  Type of devices: Call light within reach, Chair alarm in place, Left in chair, Nurse notified     Therapy Time   Individual Concurrent Group Co-treatment   Time In 1050         Time Out 1130         Minutes 40              If patient is discharged prior to the next physical therapy visit;  Please see last written PT note for discharge status.     Meggan Perez, PT, [id number]   MEGGAN PEREZ, PT

## 2019-08-12 NOTE — PROGRESS NOTES
(Last 24 hours) at 8/12/2019 0930  Last data filed at 8/12/2019 0600  Gross per 24 hour   Intake 180 ml   Output 0 ml   Net 180 ml       General: Alert and oriented. Resting in bed in no apparent distress. Pleasant and cooperative. Obese. Family and staff at bedside. HEENT: Normocephalic. Atraumatic. Pupils equal and reactive. EOM intact. Oral mucosa pink/moist/intact. Left sided neglect  Neck: Supple. Symmetrical. Trachea midline. Lungs: Clear to auscultation bilaterally. Respirations even and unlabored. Chest: Exam unremarkable. Cardiac: S1/S2 noted. Regular Rhythm and rate. Abdomen/GI: Soft. Non-tender. Non-distended. BS+. Extremities: PP+. Atraumatic. No redness/cyanosis/edema noted. Brisk cap refill. Skin: Dry and intact. No lesions noted. Neuro: Grossly intact. No focal deficits noted. NIH 9, flaccid in the RUE, 4/5 strength in RLE, continues with left sided neglect  LABS:    Lab Results   Component Value Date    WBC 6.4 08/10/2019    HGB 12.6 08/10/2019    HCT 38.4 08/10/2019    MCV 77.2 (L) 08/10/2019     08/10/2019    LYMPHOPCT 14.0 08/09/2019    RBC 4.97 08/10/2019    MCH 25.4 (L) 08/10/2019    MCHC 32.9 08/10/2019    RDW 17.0 (H) 08/10/2019       Lab Results   Component Value Date    CREATININE 0.7 08/12/2019    BUN 8 08/12/2019     08/12/2019    K 3.6 08/12/2019     08/12/2019    CO2 27 08/12/2019       No results found for: MG    No results found for: ALT, AST, GGT, ALKPHOS, BILITOT     No flowsheet data found. Imaging:  MRI BRAIN WO CONTRAST   Final Result   Acute infarct within the right mid corona radiata. The findings were sent to the Radiology Results [address] at 5:07   pm on 8/10/2019to be communicated to a licensed caregiver. CTA HEAD NECK W CONTRAST   Final Result   Mild plaque both proximal ICAs. Otherwise no hemodynamic stenosis or   occlusion identified per NASCET criteria.       No evidence of hemodynamically significant stenosis or occlusion of   intracranial circulation. CT HEAD WO CONTRAST   Final Result   No acute intracranial abnormality. Findings were discussed with Tobi Gregory at 7:20 pm on 8/9/2019. Assessment & Plan:        Acute infarct within the right mid corona radiata  - PT/OT  - BP medsper neuro, low dose norvasc, labetolol with parameters  -Started on Plavix and continue aspirin x1 month then asa 325 there after  - Stati  - Aggressive risk factor modification at this time  - Neurology following. Appreciate assistance  - a1c 5.7  - Echo today. In process  - PM&R consulted to eval for acute rehab    Hypertensive urgency with a hx of HTN  - 2/2 the above  - Continue labetalol/norvasc  - Monitor BP  - Titrate medications as needed. Mixed hyperlipidemia  -Statin    Obesity with BMI of 36.43  [] Obesity - ?30 kg/m2  [] Class I - 30.0 to 34.9 kg/m2  [] Class II - 35.0 to 39.9 kg/m2  [] Class III - ?40 kg/m2 (also referred to as severe, extreme, morbid or massive obesity)   [] Super Obesity  - > 50% kg/m2  [] Super Super Obesity  - > 75% kg/m2  Complicating assessment and treatment. Obesity Places the patient at risk for multiple co-morbidities as well as early death and may be contributing to the patient's presentation. Supportive care. Encourage therapeutic lifestyle changes. Body mass index is 36.43 kg/m². The patient and / or the family were informed of the results of any tests, a time was given to answer questions, a plan was proposed and they agreed with plan.     DVT prophylaxis: [x] Lovenox  [] SQ Heparin  [] SCDs because of  [] warfarin/oral direct thrombin inhibitor [] Encourage ambulation    GI prophylaxis: [] [id number]  [x] not indicated    Probiotic if on abx: [] Yes [] No [x] Not Indicated    Diet: DIET CARDIAC;    Consults:  IP CONSULT TO STROKE TEAM  IP CONSULT TO HOSPITALIST  IP CONSULT TO NEUROLOGY    Disposition:  [] Home  [] Home with home health [x] Rehab [] Psych [] SNF  [] LTAC  [] Long term nursing home or group home [] Transfer to ICU  [] Transfer to PCU [] Other:    Code Status: Full Code    ELOS: tbd      RAMESH Velázquez NP  08/12/19

## 2019-08-13 ENCOUNTER — HOSPITAL ENCOUNTER (INPATIENT)
Age: 49
LOS: 22 days | Discharge: HOME | DRG: 057 | End: 2019-09-04
Attending: PHYSICAL MEDICINE & REHABILITATION | Admitting: PHYSICAL MEDICINE & REHABILITATION
Payer: COMMERCIAL

## 2019-08-13 VITALS
SYSTOLIC BLOOD PRESSURE: 160 MMHG | OXYGEN SATURATION: 100 % | DIASTOLIC BLOOD PRESSURE: 90 MMHG | BODY MASS INDEX: 34.19 KG/M2 | RESPIRATION RATE: 16 BRPM | TEMPERATURE: 98.8 F | WEIGHT: 217.81 LBS | HEIGHT: 67 IN | HEART RATE: 82 BPM

## 2019-08-13 PROBLEM — I63.9 RIGHT SIDED CEREBRAL HEMISPHERE CEREBROVASCULAR ACCIDENT (CVA) (HCC): Status: ACTIVE | Noted: 2019-08-13

## 2019-08-13 LAB
ANION GAP SERPL CALCULATED.3IONS-SCNC: 14 MMOL/L (ref 3–16)
BASOPHILS ABSOLUTE: 0 K/UL (ref 0–0.2)
BASOPHILS RELATIVE PERCENT: 0.6 %
BUN BLDV-MCNC: 10 MG/DL (ref 7–20)
CALCIUM SERPL-MCNC: 9.9 MG/DL (ref 8.3–10.6)
CHLORIDE BLD-SCNC: 99 MMOL/L (ref 99–110)
CO2: 25 MMOL/L (ref 21–32)
CREAT SERPL-MCNC: 0.7 MG/DL (ref 0.6–1.1)
EOSINOPHILS ABSOLUTE: 0.1 K/UL (ref 0–0.6)
EOSINOPHILS RELATIVE PERCENT: 2.4 %
GFR AFRICAN AMERICAN: >60
GFR NON-AFRICAN AMERICAN: >60
GLUCOSE BLD-MCNC: 124 MG/DL (ref 70–99)
HCT VFR BLD CALC: 38.8 % (ref 36–48)
HEMOGLOBIN: 12.8 G/DL (ref 12–16)
LYMPHOCYTES ABSOLUTE: 1.2 K/UL (ref 1–5.1)
LYMPHOCYTES RELATIVE PERCENT: 21.6 %
MAGNESIUM: 2.1 MG/DL (ref 1.8–2.4)
MCH RBC QN AUTO: 25.5 PG (ref 26–34)
MCHC RBC AUTO-ENTMCNC: 32.9 G/DL (ref 31–36)
MCV RBC AUTO: 77.4 FL (ref 80–100)
MONOCYTES ABSOLUTE: 0.3 K/UL (ref 0–1.3)
MONOCYTES RELATIVE PERCENT: 5.9 %
NEUTROPHILS ABSOLUTE: 3.9 K/UL (ref 1.7–7.7)
NEUTROPHILS RELATIVE PERCENT: 69.5 %
PDW BLD-RTO: 17.1 % (ref 12.4–15.4)
PLATELET # BLD: 287 K/UL (ref 135–450)
PMV BLD AUTO: 8.4 FL (ref 5–10.5)
POTASSIUM REFLEX MAGNESIUM: 3.3 MMOL/L (ref 3.5–5.1)
RBC # BLD: 5.01 M/UL (ref 4–5.2)
SODIUM BLD-SCNC: 138 MMOL/L (ref 136–145)
WBC # BLD: 5.6 K/UL (ref 4–11)

## 2019-08-13 PROCEDURE — 83735 ASSAY OF MAGNESIUM: CPT

## 2019-08-13 PROCEDURE — 6370000000 HC RX 637 (ALT 250 FOR IP): Performed by: NURSE PRACTITIONER

## 2019-08-13 PROCEDURE — 94760 N-INVAS EAR/PLS OXIMETRY 1: CPT

## 2019-08-13 PROCEDURE — 6370000000 HC RX 637 (ALT 250 FOR IP): Performed by: INTERNAL MEDICINE

## 2019-08-13 PROCEDURE — 97535 SELF CARE MNGMENT TRAINING: CPT

## 2019-08-13 PROCEDURE — 97530 THERAPEUTIC ACTIVITIES: CPT | Performed by: PHYSICAL THERAPIST

## 2019-08-13 PROCEDURE — 85025 COMPLETE CBC W/AUTO DIFF WBC: CPT

## 2019-08-13 PROCEDURE — 36415 COLL VENOUS BLD VENIPUNCTURE: CPT

## 2019-08-13 PROCEDURE — 1280000000 HC REHAB R&B

## 2019-08-13 PROCEDURE — 6370000000 HC RX 637 (ALT 250 FOR IP): Performed by: HOSPITALIST

## 2019-08-13 PROCEDURE — 6370000000 HC RX 637 (ALT 250 FOR IP): Performed by: PHYSICAL MEDICINE & REHABILITATION

## 2019-08-13 PROCEDURE — 97530 THERAPEUTIC ACTIVITIES: CPT

## 2019-08-13 PROCEDURE — 97116 GAIT TRAINING THERAPY: CPT | Performed by: PHYSICAL THERAPIST

## 2019-08-13 PROCEDURE — 6360000002 HC RX W HCPCS: Performed by: INTERNAL MEDICINE

## 2019-08-13 PROCEDURE — 80048 BASIC METABOLIC PNL TOTAL CA: CPT

## 2019-08-13 PROCEDURE — 2580000003 HC RX 258: Performed by: INTERNAL MEDICINE

## 2019-08-13 RX ORDER — SENNA AND DOCUSATE SODIUM 50; 8.6 MG/1; MG/1
2 TABLET, FILM COATED ORAL 2 TIMES DAILY
Status: CANCELLED | OUTPATIENT
Start: 2019-08-13

## 2019-08-13 RX ORDER — ACETAMINOPHEN 325 MG/1
650 TABLET ORAL EVERY 4 HOURS PRN
Status: DISCONTINUED | OUTPATIENT
Start: 2019-08-13 | End: 2019-09-04 | Stop reason: HOSPADM

## 2019-08-13 RX ORDER — AMLODIPINE BESYLATE 10 MG/1
10 TABLET ORAL DAILY
Status: CANCELLED | OUTPATIENT
Start: 2019-08-14

## 2019-08-13 RX ORDER — SODIUM CHLORIDE 0.9 % (FLUSH) 0.9 %
10 SYRINGE (ML) INJECTION PRN
Status: DISCONTINUED | OUTPATIENT
Start: 2019-08-13 | End: 2019-09-04 | Stop reason: HOSPADM

## 2019-08-13 RX ORDER — SENNA AND DOCUSATE SODIUM 50; 8.6 MG/1; MG/1
2 TABLET, FILM COATED ORAL 2 TIMES DAILY
Status: DISCONTINUED | OUTPATIENT
Start: 2019-08-13 | End: 2019-08-22

## 2019-08-13 RX ORDER — POTASSIUM CHLORIDE 20 MEQ/1
20 TABLET, EXTENDED RELEASE ORAL
Status: DISCONTINUED | OUTPATIENT
Start: 2019-08-14 | End: 2019-08-13

## 2019-08-13 RX ORDER — CLOPIDOGREL BISULFATE 75 MG/1
75 TABLET ORAL DAILY
Qty: 30 TABLET | Refills: 0 | Status: ON HOLD | OUTPATIENT
Start: 2019-08-14 | End: 2019-09-03 | Stop reason: SDUPTHER

## 2019-08-13 RX ORDER — ASPIRIN 300 MG/1
300 SUPPOSITORY RECTAL DAILY
Status: CANCELLED | OUTPATIENT
Start: 2019-08-14

## 2019-08-13 RX ORDER — ASPIRIN 81 MG/1
81 TABLET ORAL DAILY
Qty: 30 TABLET | Refills: 0 | Status: ON HOLD | OUTPATIENT
Start: 2019-08-14 | End: 2019-09-03 | Stop reason: HOSPADM

## 2019-08-13 RX ORDER — CALCIUM CARBONATE 200(500)MG
500 TABLET,CHEWABLE ORAL 3 TIMES DAILY PRN
Status: DISCONTINUED | OUTPATIENT
Start: 2019-08-13 | End: 2019-09-04 | Stop reason: HOSPADM

## 2019-08-13 RX ORDER — ATORVASTATIN CALCIUM 80 MG/1
80 TABLET, FILM COATED ORAL NIGHTLY
Status: DISCONTINUED | OUTPATIENT
Start: 2019-08-13 | End: 2019-09-04 | Stop reason: HOSPADM

## 2019-08-13 RX ORDER — PANTOPRAZOLE SODIUM 40 MG/1
40 TABLET, DELAYED RELEASE ORAL
Status: CANCELLED | OUTPATIENT
Start: 2019-08-14

## 2019-08-13 RX ORDER — ATORVASTATIN CALCIUM 80 MG/1
80 TABLET, FILM COATED ORAL NIGHTLY
Status: CANCELLED | OUTPATIENT
Start: 2019-08-13

## 2019-08-13 RX ORDER — HYDROCHLOROTHIAZIDE 25 MG/1
25 TABLET ORAL DAILY
Qty: 30 TABLET | Refills: 0 | Status: ON HOLD | OUTPATIENT
Start: 2019-08-13 | End: 2019-09-03 | Stop reason: HOSPADM

## 2019-08-13 RX ORDER — HYDRALAZINE HYDROCHLORIDE 25 MG/1
25 TABLET, FILM COATED ORAL EVERY 6 HOURS PRN
Status: DISCONTINUED | OUTPATIENT
Start: 2019-08-13 | End: 2019-09-04 | Stop reason: HOSPADM

## 2019-08-13 RX ORDER — ATORVASTATIN CALCIUM 80 MG/1
80 TABLET, FILM COATED ORAL NIGHTLY
Qty: 30 TABLET | Refills: 0 | Status: ON HOLD | OUTPATIENT
Start: 2019-08-13 | End: 2019-09-03 | Stop reason: SDUPTHER

## 2019-08-13 RX ORDER — ONDANSETRON 4 MG/1
4 TABLET, FILM COATED ORAL EVERY 8 HOURS PRN
Status: DISCONTINUED | OUTPATIENT
Start: 2019-08-13 | End: 2019-09-04 | Stop reason: HOSPADM

## 2019-08-13 RX ORDER — CLOPIDOGREL BISULFATE 75 MG/1
75 TABLET ORAL DAILY
Status: DISCONTINUED | OUTPATIENT
Start: 2019-08-14 | End: 2019-09-04 | Stop reason: HOSPADM

## 2019-08-13 RX ORDER — PANTOPRAZOLE SODIUM 40 MG/1
40 TABLET, DELAYED RELEASE ORAL
Status: DISCONTINUED | OUTPATIENT
Start: 2019-08-14 | End: 2019-09-04 | Stop reason: HOSPADM

## 2019-08-13 RX ORDER — ONDANSETRON 4 MG/1
4 TABLET, FILM COATED ORAL EVERY 8 HOURS PRN
Status: CANCELLED | OUTPATIENT
Start: 2019-08-13

## 2019-08-13 RX ORDER — PANTOPRAZOLE SODIUM 40 MG/1
40 TABLET, DELAYED RELEASE ORAL
Qty: 30 TABLET | Refills: 0 | Status: ON HOLD | OUTPATIENT
Start: 2019-08-14 | End: 2019-09-03 | Stop reason: HOSPADM

## 2019-08-13 RX ORDER — LABETALOL HYDROCHLORIDE 5 MG/ML
10 INJECTION, SOLUTION INTRAVENOUS EVERY 10 MIN PRN
Status: CANCELLED | OUTPATIENT
Start: 2019-08-13

## 2019-08-13 RX ORDER — ACETAMINOPHEN 325 MG/1
650 TABLET ORAL EVERY 4 HOURS PRN
Status: CANCELLED | OUTPATIENT
Start: 2019-08-13

## 2019-08-13 RX ORDER — POLYETHYLENE GLYCOL 3350 17 G/17G
17 POWDER, FOR SOLUTION ORAL DAILY
Status: DISCONTINUED | OUTPATIENT
Start: 2019-08-13 | End: 2019-08-19

## 2019-08-13 RX ORDER — AMLODIPINE BESYLATE 10 MG/1
10 TABLET ORAL DAILY
Status: DISCONTINUED | OUTPATIENT
Start: 2019-08-14 | End: 2019-09-04 | Stop reason: HOSPADM

## 2019-08-13 RX ORDER — SODIUM CHLORIDE 0.9 % (FLUSH) 0.9 %
10 SYRINGE (ML) INJECTION EVERY 12 HOURS SCHEDULED
Status: DISCONTINUED | OUTPATIENT
Start: 2019-08-13 | End: 2019-08-15

## 2019-08-13 RX ORDER — ASPIRIN 81 MG/1
81 TABLET ORAL DAILY
Status: CANCELLED | OUTPATIENT
Start: 2019-08-14

## 2019-08-13 RX ORDER — CLOPIDOGREL BISULFATE 75 MG/1
75 TABLET ORAL DAILY
Status: CANCELLED | OUTPATIENT
Start: 2019-08-14

## 2019-08-13 RX ORDER — CALCIUM CARBONATE 200(500)MG
500 TABLET,CHEWABLE ORAL 3 TIMES DAILY PRN
Qty: 30 TABLET | Refills: 0 | Status: ON HOLD | OUTPATIENT
Start: 2019-08-13 | End: 2019-09-03 | Stop reason: HOSPADM

## 2019-08-13 RX ORDER — POLYETHYLENE GLYCOL 3350 17 G/17G
17 POWDER, FOR SOLUTION ORAL DAILY
Status: CANCELLED | OUTPATIENT
Start: 2019-08-13

## 2019-08-13 RX ORDER — POTASSIUM CHLORIDE 20 MEQ/1
20 TABLET, EXTENDED RELEASE ORAL
Status: DISCONTINUED | OUTPATIENT
Start: 2019-08-13 | End: 2019-08-13 | Stop reason: HOSPADM

## 2019-08-13 RX ORDER — SODIUM CHLORIDE 0.9 % (FLUSH) 0.9 %
10 SYRINGE (ML) INJECTION EVERY 12 HOURS SCHEDULED
Status: CANCELLED | OUTPATIENT
Start: 2019-08-13

## 2019-08-13 RX ORDER — ASPIRIN 300 MG/1
300 SUPPOSITORY RECTAL DAILY
Status: DISCONTINUED | OUTPATIENT
Start: 2019-08-14 | End: 2019-09-04 | Stop reason: HOSPADM

## 2019-08-13 RX ORDER — HYDROCHLOROTHIAZIDE 25 MG/1
25 TABLET ORAL DAILY
Status: DISCONTINUED | OUTPATIENT
Start: 2019-08-13 | End: 2019-08-13 | Stop reason: HOSPADM

## 2019-08-13 RX ORDER — POTASSIUM CHLORIDE 20 MEQ/1
20 TABLET, EXTENDED RELEASE ORAL
Qty: 60 TABLET | Refills: 0 | Status: ON HOLD | OUTPATIENT
Start: 2019-08-13 | End: 2019-09-03 | Stop reason: HOSPADM

## 2019-08-13 RX ORDER — AMLODIPINE BESYLATE 5 MG/1
5 TABLET ORAL DAILY
Qty: 30 TABLET | Refills: 0 | Status: ON HOLD | OUTPATIENT
Start: 2019-08-14 | End: 2019-09-03 | Stop reason: HOSPADM

## 2019-08-13 RX ORDER — LABETALOL HYDROCHLORIDE 5 MG/ML
10 INJECTION, SOLUTION INTRAVENOUS EVERY 10 MIN PRN
Status: DISCONTINUED | OUTPATIENT
Start: 2019-08-13 | End: 2019-08-13 | Stop reason: ALTCHOICE

## 2019-08-13 RX ORDER — CALCIUM CARBONATE 200(500)MG
500 TABLET,CHEWABLE ORAL 3 TIMES DAILY PRN
Status: CANCELLED | OUTPATIENT
Start: 2019-08-13

## 2019-08-13 RX ORDER — ASPIRIN 81 MG/1
81 TABLET ORAL DAILY
Status: DISCONTINUED | OUTPATIENT
Start: 2019-08-14 | End: 2019-09-04 | Stop reason: HOSPADM

## 2019-08-13 RX ORDER — SODIUM CHLORIDE 0.9 % (FLUSH) 0.9 %
10 SYRINGE (ML) INJECTION PRN
Status: CANCELLED | OUTPATIENT
Start: 2019-08-13

## 2019-08-13 RX ADMIN — ATORVASTATIN CALCIUM 80 MG: 80 TABLET, FILM COATED ORAL at 21:57

## 2019-08-13 RX ADMIN — SENNOSIDES, DOCUSATE SODIUM 2 TABLET: 50; 8.6 TABLET, FILM COATED ORAL at 21:57

## 2019-08-13 RX ADMIN — AMLODIPINE BESYLATE 5 MG: 5 TABLET ORAL at 09:19

## 2019-08-13 RX ADMIN — SODIUM CHLORIDE, PRESERVATIVE FREE 10 ML: 5 INJECTION INTRAVENOUS at 09:18

## 2019-08-13 RX ADMIN — ENOXAPARIN SODIUM 40 MG: 40 INJECTION SUBCUTANEOUS at 09:18

## 2019-08-13 RX ADMIN — HYDRALAZINE HYDROCHLORIDE 25 MG: 25 TABLET, FILM COATED ORAL at 21:57

## 2019-08-13 RX ADMIN — HYDROCHLOROTHIAZIDE 25 MG: 25 TABLET ORAL at 12:12

## 2019-08-13 RX ADMIN — PANTOPRAZOLE SODIUM 40 MG: 40 TABLET, DELAYED RELEASE ORAL at 09:18

## 2019-08-13 RX ADMIN — POTASSIUM CHLORIDE 20 MEQ: 20 TABLET, EXTENDED RELEASE ORAL at 12:12

## 2019-08-13 RX ADMIN — ASPIRIN 81 MG: 81 TABLET ORAL at 09:19

## 2019-08-13 RX ADMIN — CLOPIDOGREL BISULFATE 75 MG: 75 TABLET ORAL at 09:19

## 2019-08-13 ASSESSMENT — PAIN SCALES - GENERAL
PAINLEVEL_OUTOF10: 0

## 2019-08-13 NOTE — PROGRESS NOTES
back to \"normal\"    Plan    Plan  Times per week: 3-5  Current Treatment Recommendations: Functional Mobility Training  Safety Devices  Type of devices: Call light within reach, Chair alarm in place, Left in chair, Nurse notified     Therapy Time   Individual Concurrent Group Co-treatment   Time In 0912         Time Out 1017         Minutes 65              If patient is discharged prior to the next physical therapy visit;  Please see last written PT note for discharge status.     Meggan Perez, PT, [phone]   MEGGAN PEREZ, PT

## 2019-08-13 NOTE — DISCHARGE SUMMARY
Pt informed on transfer to [facility]. Belongings bagged. Awaiting transport.
08/13/2019    HGB 12.8 08/13/2019    HCT 38.8 08/13/2019     08/13/2019       Renal:    Lab Results   Component Value Date     08/13/2019    K 3.3 08/13/2019    CL 99 08/13/2019    CO2 25 08/13/2019    BUN 10 08/13/2019    CREATININE 0.7 08/13/2019    CALCIUM 9.9 08/13/2019    PHOS 2.9 08/12/2019       No future appointments. Time Spent on discharge is more than 45 minutes in the examination, evaluation, counseling and review of medications and discharge plan. RX monitoring reviewed N/A    Signed:    RAMESH Zuniga - RAUDEL   8/13/2019      Thank you Nicole Easton MD for the opportunity to be involved in this patient's care. If you have any questions or concerns please feel free to contact me at [phone].

## 2019-08-13 NOTE — PROGRESS NOTES
4 Eyes Skin Assessment     The patient is being assess for  Admission    I agree that 2 RN's have performed a thorough Head to Toe Skin Assessment on the patient. ALL assessment sites listed below have been assessed. Areas assessed by both nurses: alberto kumar and shaquille amato  [x]   Head, Face, and Ears   [x]   Shoulders, Back, and Chest  [x]   Arms, Elbows, and Hands   [x]   Coccyx, Sacrum, and IschIum  [x]   Legs, Feet, and Heels        Does the Patient have Skin Breakdown?   No         Freddie Prevention initiated:  Yes   Wound Care Orders initiated:  No      [facility] nurse consulted for Pressure Injury (Stage 3,4, Unstageable, DTI, NWPT, and Complex wounds), New and Established Ostomies:  No      Nurse 1 eSignature: Electronically signed by Francheska Mishra RN on 8/13/19 at 6:53 PM    **SHARE this note so that the co-signing nurse is able to place an eSignature**    Nurse 2 eSignature: Electronically signed by Gabby Cole RN on 8/13/19 at 7:10 PM

## 2019-08-13 NOTE — CONSULTS
RDW 17.1 (H) 12.4 - 15.4 %    MPV 8.4 5.0 - 10.5 fL    Neutrophils % 69.5 %    Lymphocytes % 21.6 %    Monocytes % 5.9 %    Eosinophils % 2.4 %    Basophils % 0.6 %    Neutrophils # 3.9 1.7 - 7.7 K/uL    Lymphocytes # 1.2 1.0 - 5.1 K/uL    Monocytes # 0.3 0.0 - 1.3 K/uL    Eosinophils # 0.1 0.0 - 0.6 K/uL    Basophils # 0.0 0.0 - 0.2 K/uL   Basic Metabolic Panel w/ Reflex to MG    Collection Time: 08/13/19  4:55 AM   Result Value Ref Range    Sodium 138 136 - 145 mmol/L    Potassium reflex Magnesium 3.3 (L) 3.5 - 5.1 mmol/L    Chloride 99 99 - 110 mmol/L    CO2 25 21 - 32 mmol/L    Anion Gap 14 3 - 16    Glucose 124 (H) 70 - 99 mg/dL    BUN 10 7 - 20 mg/dL    CREATININE 0.7 0.6 - 1.1 mg/dL    GFR Non-African American >60 >60    GFR African American >60 >60    Calcium 9.9 8.3 - 10.6 mg/dL   Magnesium    Collection Time: 08/13/19  4:55 AM   Result Value Ref Range    Magnesium 2.10 1.80 - 2.40 mg/dL         Plan:  We will plan to admit to our rehab unit today when cleared by hospitalist.      Dr. Sushil Monet

## 2019-08-13 NOTE — DISCHARGE INSTR - COC
Continuity of Care Form    Patient Name: Tylor Brown   :  1970  MRN:  [id number]    Admit date:  2019  Discharge date:  2019    Code Status Order: Full Code   Advance Directives:   [facility] Documentation     Date/Time Healthcare Directive Type of Healthcare Directive Copy in [address] Agent's Name Healthcare Agent's Phone Number    08/10/19 0003  No, patient does not have an advance directive for healthcare treatment  Other (Comment)  Other (Comment)  --  --  --          Admitting Physician:  Vinay Coleman MD  PCP: Cesar Butt MD    Discharging Nurse: [facility] Unit/Room#: T0I-1062/8584-87  Discharging Unit Phone Number: [phone]    Emergency Contact:   Extended Emergency Contact Information  Primary Emergency Contact: Marissa Caballero           [address] Phone: [phone]  Relation: Parent  Secondary Emergency Contact: None,Per Pt  Relation: Other    Past Surgical History:  Past Surgical History:   Procedure Laterality Date     SECTION      x3       Immunization History: There is no immunization history on file for this patient.     Active Problems:  Patient Active Problem List   Diagnosis Code    Acute ischemic stroke (HCC) I63.9       Isolation/Infection:   Isolation          No Isolation            Nurse Assessment:  Last Vital Signs: BP (!) 154/95   Pulse 92   Temp 98.9 °F (37.2 °C) (Oral)   Resp 16   Ht 5' 7\" (1.702 m)   Wt 217 lb 13 oz (98.8 kg) Comment: 2 pillows, sheet, call light  LMP 2019   SpO2 98%   BMI 34.11 kg/m²     Last documented pain score (0-10 scale): Pain Level: 0  Last Weight:   Wt Readings from Last 1 Encounters:   19 217 lb 13 oz (98.8 kg)     Mental Status:  oriented, alert, coherent, logical, thought processes intact and able to concentrate and follow conversation    IV Access:  - None    Nursing Mobility/ADLs:  Walking

## 2019-08-13 NOTE — PROGRESS NOTES
Comments: Min/Mod A x2 for sit><stands. Use of federico walker for transfer  Bed mobility  Supine to Sit: Minimal assistance         Neuromuscular Education  NDT Treatment: Upper extremity(wt bearing thru LUE with ADL's)     Cognition  Overall Cognitive Status: Exceptions  Following Commands: Follows one step commands consistently  Attention Span: Appears intact  Memory: Decreased recall of precautions  Safety Judgement: Decreased awareness of need for safety  Insights: Decreased awareness of deficits  Initiation: Requires cues for some  Sequencing: Requires cues for some  Cognition Comment: Need to further assess     Perception  Overall Perceptual Status: Impaired  Unilateral Attention: Cues to attend left visual field;Cues to attend to left side of body           Upper Extremity Function  NDT Treatment: Upper extremity(wt bearing thru LUE with ADL's)         Plan   Plan  Times per week: 3-5  Times per day: Daily  Current Treatment Recommendations: Strengthening, ROM, Balance Training, Functional Mobility Training, Endurance Training, Neuromuscular Re-education, Equipment Evaluation, Education, & procurement, Self-Care / ADL, Patient/Caregiver Education & Training, Safety Education & Training, Positioning    AM-PAC Score        AM-[facility] Inpatient Daily Activity Raw Score: 13 (08/13/19 1020)  AM-PAC Inpatient ADL T-Scale Score : 32.03 (08/13/19 1020)  ADL Inpatient CMS 0-100% Score: 63.03 (08/13/19 1020)  ADL Inpatient CMS G-Code Modifier : CL (08/13/19 1020)    Goals  Short term goals  Time Frame for Short term goals: Prior to dc:  All goals ongoing   Short term goal 1: Pt will complete fxl transfers with min A   Short term goal 2: Pt will complete grooming with CGA   Short term goal 3: Pt will bathe and dress UB with min A, LB with mod A   Short term goal 4: Pt will toilet with mod A   Short term goal 5: Pt will increase ROM, strength, and fxl use of L UE   Long term goals  Time Frame for Long term goals : TBD by dc

## 2019-08-14 LAB
ANION GAP SERPL CALCULATED.3IONS-SCNC: 15 MMOL/L (ref 3–16)
BUN BLDV-MCNC: 14 MG/DL (ref 7–20)
CALCIUM SERPL-MCNC: 10 MG/DL (ref 8.3–10.6)
CHLORIDE BLD-SCNC: 98 MMOL/L (ref 99–110)
CO2: 25 MMOL/L (ref 21–32)
CREAT SERPL-MCNC: 0.8 MG/DL (ref 0.6–1.1)
GFR AFRICAN AMERICAN: >60
GFR NON-AFRICAN AMERICAN: >60
GLUCOSE BLD-MCNC: 115 MG/DL (ref 70–99)
HCT VFR BLD CALC: 39.9 % (ref 36–48)
HEMOGLOBIN: 13.3 G/DL (ref 12–16)
MAGNESIUM: 2 MG/DL (ref 1.8–2.4)
MCH RBC QN AUTO: 25.7 PG (ref 26–34)
MCHC RBC AUTO-ENTMCNC: 33.3 G/DL (ref 31–36)
MCV RBC AUTO: 77.4 FL (ref 80–100)
PDW BLD-RTO: 17.1 % (ref 12.4–15.4)
PLATELET # BLD: 309 K/UL (ref 135–450)
PMV BLD AUTO: 8.5 FL (ref 5–10.5)
POTASSIUM REFLEX MAGNESIUM: 3.7 MMOL/L (ref 3.5–5.1)
RBC # BLD: 5.16 M/UL (ref 4–5.2)
SODIUM BLD-SCNC: 138 MMOL/L (ref 136–145)
WBC # BLD: 8.1 K/UL (ref 4–11)

## 2019-08-14 PROCEDURE — 6360000002 HC RX W HCPCS: Performed by: PHYSICAL MEDICINE & REHABILITATION

## 2019-08-14 PROCEDURE — 85027 COMPLETE CBC AUTOMATED: CPT

## 2019-08-14 PROCEDURE — 92610 EVALUATE SWALLOWING FUNCTION: CPT

## 2019-08-14 PROCEDURE — 6370000000 HC RX 637 (ALT 250 FOR IP): Performed by: PHYSICAL MEDICINE & REHABILITATION

## 2019-08-14 PROCEDURE — 1280000000 HC REHAB R&B

## 2019-08-14 PROCEDURE — 83735 ASSAY OF MAGNESIUM: CPT

## 2019-08-14 PROCEDURE — 80048 BASIC METABOLIC PNL TOTAL CA: CPT

## 2019-08-14 PROCEDURE — 92523 SPEECH SOUND LANG COMPREHEN: CPT

## 2019-08-14 PROCEDURE — 97530 THERAPEUTIC ACTIVITIES: CPT

## 2019-08-14 PROCEDURE — 97116 GAIT TRAINING THERAPY: CPT

## 2019-08-14 PROCEDURE — 97535 SELF CARE MNGMENT TRAINING: CPT

## 2019-08-14 PROCEDURE — 94760 N-INVAS EAR/PLS OXIMETRY 1: CPT

## 2019-08-14 PROCEDURE — 97162 PT EVAL MOD COMPLEX 30 MIN: CPT

## 2019-08-14 PROCEDURE — 97165 OT EVAL LOW COMPLEX 30 MIN: CPT

## 2019-08-14 PROCEDURE — 36415 COLL VENOUS BLD VENIPUNCTURE: CPT

## 2019-08-14 RX ORDER — POTASSIUM CHLORIDE 750 MG/1
10 TABLET, FILM COATED, EXTENDED RELEASE ORAL
Status: DISCONTINUED | OUTPATIENT
Start: 2019-08-14 | End: 2019-09-04 | Stop reason: HOSPADM

## 2019-08-14 RX ADMIN — ENOXAPARIN SODIUM 40 MG: 40 INJECTION SUBCUTANEOUS at 11:07

## 2019-08-14 RX ADMIN — HYDRALAZINE HYDROCHLORIDE 25 MG: 25 TABLET, FILM COATED ORAL at 17:21

## 2019-08-14 RX ADMIN — SENNOSIDES, DOCUSATE SODIUM 2 TABLET: 50; 8.6 TABLET, FILM COATED ORAL at 11:08

## 2019-08-14 RX ADMIN — PANTOPRAZOLE SODIUM 40 MG: 40 TABLET, DELAYED RELEASE ORAL at 06:13

## 2019-08-14 RX ADMIN — HYDRALAZINE HYDROCHLORIDE 25 MG: 25 TABLET, FILM COATED ORAL at 04:37

## 2019-08-14 RX ADMIN — CLOPIDOGREL BISULFATE 75 MG: 75 TABLET ORAL at 11:07

## 2019-08-14 RX ADMIN — ATORVASTATIN CALCIUM 80 MG: 80 TABLET, FILM COATED ORAL at 22:08

## 2019-08-14 RX ADMIN — POTASSIUM CHLORIDE 10 MEQ: 750 TABLET, EXTENDED RELEASE ORAL at 11:08

## 2019-08-14 RX ADMIN — ASPIRIN 81 MG: 81 TABLET ORAL at 11:08

## 2019-08-14 RX ADMIN — SENNOSIDES, DOCUSATE SODIUM 2 TABLET: 50; 8.6 TABLET, FILM COATED ORAL at 22:08

## 2019-08-14 RX ADMIN — AMLODIPINE BESYLATE 10 MG: 10 TABLET ORAL at 11:08

## 2019-08-14 ASSESSMENT — PAIN SCALES - GENERAL
PAINLEVEL_OUTOF10: 0

## 2019-08-14 NOTE — CARE COORDINATION
LSW reviewed chart. LSW met with patient, mother, friend at bedside to introduce  role, initiate discussion regarding DC planning and to inform of weekly Team Conferences on Mondays. She prefers to be called GISELE GRIMES. SOCIAL WORK ASSESSMENT      GOAL:   To return home or to stay with her mother for a while. HOME SITUATION:  Patient and adult son, aged 22 live in a second floor apartment which has 2 plus 20 steps to enter. 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 She will have [place] paperwork sent to this worker from her employer to have MD complete. LSW gave fax and email address for this to happen. She is not active with PCP Dr Jarred Zepeda but last time she saw her was a year ago. She plans to return to see her upon discharge. She does plan to return home if able or she could go to her mother's house where she could stay on the first floor without a bathroom on first floor. Her mother does not work and would be able to assist her. PRIOR LEVEL OF FUNCTIONING:       PERSONAL CARE:    Totally independent                                                                       DRIVES:   yes                                                                     FINANCES:   independent                                                                   MEALS:       indept                           GROCERY SHOPS:      DME CURRENTLY AT HOME:  None      CURRENT HOME CARE/SERVICES:  LSW provided her with possible post acute services such as home  Care or outpatient services to continue her therapy needs. LSW provided a list of home care agencies for her review. PREFERRED HOME CARE:  To bedetermined. TEAM CONFERENCE DAY:  Mondays. LSW informed her of weekly review of her progress is done by Team Only on Mondays. Team will review progress, DME recommendations and DC date.   This

## 2019-08-14 NOTE — PLAN OF CARE
Education & Training, Equipment Evaluation, Education, & procurement      OCCUPATIONAL THERAPY:  Goals:             Short term goals  Time Frame for Short term goals: In 1 - 2 weeks,   Short term goal 1: Pt will complete feeding with setup of containers  Short term goal 2: Pt will complete grooming seated with min A   Short term goal 3: Pt will complete bathing with min A seated on BSC  Short term goal 4: Pt will complete UE dressing with min A and use of federico techniques   Short term goal 5: Pt will complete LE dressing with min A and use of A/E  Short term goal 6: Pt will complete toileting with min A   Short term goal 7: Pt will complete household t/f with min A (anticipate start with stand pivot t/f using least restrictive AD) :  Long term goals  Time Frame for Long term goals : In 3 - 4 weeks,  Long term goal 1: Pt will complete feeding with mod I for extra time to open containers  Long term goal 2: Pt will complete grooming seated with mod I for extra time using federico-technique   Long term goal 3: Pt will complete bathing with close SBA and setup seated on BSC  Long term goal 4: Pt will complete UE dressing with mod I for extra time   Long term goal 5: Pt will complete LE dressing & toileting with SBA and use of A/E  Long term goals 6: Pt will tolerate standing for 8 - 10 minutes with SBA during ADL participation   Long term goal 7: Pt will attend to L hemibody with min VCs during ADL participation  Long term goal 8: Pt will complete w/c mobility for 150 feet and household t/f with SBA  :    These goals were reviewed with this patient at the time of assessment and Kalpesh Otoole is in agreement    Plan of Care:  Pt to be seen 90 mins per day for 5-7 day/week 3-4 weeks. Patient Education: Rehab schedule      SPEECH THERAPY: Goals will be left blank if speech is not following this patient.   Goals:                            Dysphagia Goals:   Pt goal is to \"eat without fear of choking and without biting left cheek and tongue\"  The patient will tolerate recommended diet without observed clinical signs of aspiration, The patient/caregiver will demonstrate understanding of compensatory strategies for improved swallowing safety. , The patient will improve oral motor function via therapeutic oral motor exercises to 5/5 each trial., The patient will recall and perform compensatory strategies, with no cues. (The patient will complete laryngeal/pharyngeal exercises 15/15)                            Short-term Goals  Pt goal is \"to get back to work and to get my speech back, it gets so slurred when I am tired\"  Goal 1: Pt will demonstrate improved articulatory precision via oral motor exercises over 90%. Goal 2: Pt will demonstrate imrpoved working memory for new learning techniques; new information; and rule application with set up, use of compensatory strategies and intermittent assist.   Goal 3: The pt will particiapte in further assessment of higher level cogntiive linguistic skills for advanced daily living executive function                  These goals were reviewed with this patient at the time of assessment and Oscar Bo is in agreement    Plan of Care: Pt to be seen 45 mins per day for 5 day/week 2 weeks. CASE MANAGEMENT:  Goals:   Assist patient/family with discharge planning, patient/family counseling,   and coordination with insurance during ARU stay.       Admission Period/Goal FIM SCORES  FIM Admit/Goal Score   Eating/Swallowing 5/6   Grooming 5/6   Bathing 2/5   Upper Body Dressing 3/6   Lower Body Dressing 1/5   Toileting 1/5   Bladder Mark, Accidents = FIM 1/6   Bowel  Mark, Accidents = FIM 3/6   Bed/Chair Transfer 1/5   Toilet Transfer 1/5   Tub/Shower Transfer  1/5   Locomotion:  Ambulation (Walk) / Wheelchair:  W = walk , w/c = wheelchair  Distance:   1= 0-49 ft , 2=  ft, 3= 150+ ft Distance: 1   Level of Assist: 1   Mode: b   FIM: 1/5      Stairs 0/1   Comprehension 5/6   Expression 6/7

## 2019-08-14 NOTE — PROGRESS NOTES
Admitted SP CVA. Alert and oriented x 4. Left facial droop, speech slightly slurred. Left arm flaccid, supported with pillows, rolled wash cloth in her hand to prevent contracture. Medications taken whole(large one split in half) with thin liquids. Left leg weaker than right with limited movement. Transferred to bathroom with pedrito sher. Needs assist from staff to pull up and down her underwear and wipe. On her menses, staff needed to place peripad in underwear. Family member in room spending the night. Call light with in reach. Bed exit alarm activated.  Navya Saucedo RN

## 2019-08-14 NOTE — H&P
fatigue, + Obesity  EYES: negative for blurred vision, eye discharge, visual disturbance and icterus. HEENT: negative for hearing loss, tinnitus, ear drainage, sinus pressure, nasal congestion, epistaxis and snoring. RESPIRATORY: Negative for hemoptysis, cough, sputum production  CARDIOVASCULAR: negative for chest pain, palpitations, exertional chest pressure/discomfort, edema, syncope. + HTN  GASTROINTESTINAL: negative for nausea, vomiting, diarrhea, constipation, blood in stool and abdominal pain. GENITOURINARY: negative for frequency, dysuria, urinary incontinence, decreased urine volume, and hematuria. HEMATOLOGIC/LYMPHATIC: negative for easy bruising, bleeding and lymphadenopathy. ALLERGIC/IMMUNOLOGIC: negative for recurrent infections, angioedema, anaphylaxis and drug reactions. ENDOCRINE: negative for weight changes and diabetic symptoms including polyuria, polydipsia and polyphagia. MUSCULOSKELETAL: negative for pain, joint swelling, decreased range of motion and muscle weakness. NEUROLOGICAL: negative for headaches, slurred speech, unilateral weakness. PSYCHIATRIC/BEHAVIORAL: negative for hallucinations, behavioral problems, confusion and agitation. All pertinent positives are noted in the HPI. Physical Examination:  Vitals:   Patient Vitals for the past 24 hrs:   BP Temp Temp src Pulse Resp SpO2 Height Weight   08/14/19 0613 138/76 -- -- -- -- -- -- --   08/14/19 0431 (!) 166/104 98 °F (36.7 °C) Oral 77 16 98 % -- --   08/13/19 2329 (!) 144/88 -- -- -- -- -- -- --   08/13/19 2156 (!) 170/100 -- -- -- -- -- -- --   08/13/19 1925 -- -- -- -- -- -- 5' 7\" (1.702 m) --   08/13/19 1805 (!) 164/102 98.1 °F (36.7 °C) Oral 86 18 98 % -- --   08/13/19 1645 -- -- -- -- -- -- -- 211 lb 10.3 oz (96 kg)     Psych: Stable mood, normal judgement, normal affect   Const: No distress  Eyes: Conjunctiva noninjected, no icterus noted; pupils equal, round, and reactive to light.    HENT: Atraumatic, normocephalic; Oral mucosa moist  Neck: Trachea midline, neck supple. No thyromegaly noted. CV: Regular rate and rhythm, no murmur rub or gallop noted  Resp: Lungs clear to auscultation bilaterally, no rales wheezes or ronchi, no retractions. Respirations unlabored. GI: Obese, Soft, nontender, nondistended. Normal bowel sounds. No palpable masses. Neuro: Alert, oriented, appropriate. Left facial weakness noted, Motor examination reveals normal strength in right side, no movement left arm, and 3+/5 strength left leg. Skin: Normal temperature and turgor  MSK: No joint abnormalities noted. Ext: No significant edema appreciated. No varicosities. Lab Results   Component Value Date    WBC 8.1 08/14/2019    HGB 13.3 08/14/2019    HCT 39.9 08/14/2019    MCV 77.4 (L) 08/14/2019     08/14/2019     Lab Results   Component Value Date    INR 1.02 08/09/2019    PROTIME 11.6 08/09/2019     Lab Results   Component Value Date    CREATININE 0.8 08/14/2019    BUN 14 08/14/2019     08/14/2019    K 3.7 08/14/2019    CL 98 (L) 08/14/2019    CO2 25 08/14/2019     No results found for: ALT, AST, GGT, ALKPHOS, BILITOT    Ct Head Wo Contrast    Result Date: 8/9/2019  EXAMINATION: CT OF THE HEAD WITHOUT CONTRAST  8/9/2019 3:59 pm TECHNIQUE: CT of the head was performed without the administration of intravenous contrast. Dose modulation, iterative reconstruction, and/or weight based adjustment of the mA/kV was utilized to reduce the radiation dose to as low as reasonably achievable. COMPARISON: None. HISTORY: ORDERING SYSTEM PROVIDED HISTORY: facial droop, tingling TECHNOLOGIST PROVIDED HISTORY: Has a \"code stroke\" or \"stroke alert\" been called? 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 No acute loss of the gray-white matter differentiation is identified to suggest acute or subacute infarct. No masses or hemorrhages within the brain parenchyma are found. No evidence of midline shift. Cavum septum pellucidum et vergae noted. The intracranial vasculature, including the dural venous sinuses, is within normal limits. ORBITS: The visualized portion of the orbits demonstrate no acute abnormality. SINUSES: The visualized paranasal sinuses and mastoid air cells demonstrate no acute abnormality. SOFT TISSUES/SKULL:  No acute abnormality of the visualized skull or soft tissues. No acute intracranial abnormality. Findings were discussed with Ana Laura Toro at 7:20 pm on 8/9/2019. Cta Head Neck W Contrast    Result Date: 8/9/2019  EXAMINATION: CTA OF THE HEAD AND NECK WITH CONTRAST 8/9/2019 7:32 pm: TECHNIQUE: CTA of the head and neck was performed with the administration of intravenous contrast. Multiplanar reformatted images are provided for review. MIP images are provided for review. Stenosis of the internal carotid arteries measured using NASCET criteria. Dose modulation, iterative reconstruction, and/or weight based adjustment of the mA/kV was utilized to reduce the radiation dose to as low as reasonably achievable. COMPARISON: CT head 08/09/2019 HISTORY: ORDERING SYSTEM PROVIDED HISTORY: dysarthria TECHNOLOGIST PROVIDED HISTORY: Reason for Exam: left facial droop; tongue and left arm tingling and numb; lnw 0900 FINDINGS: CTA NECK: AORTIC ARCH/ARCH VESSELS: There is a normal branch pattern of the aortic arch. No significant stenosis is seen of the innominate artery or subclavian arteries. CAROTID ARTERIES: The common carotid arteries are normal in appearance without evidence of a flow limiting stenosis.  The internal carotid arteries demonstrate mild proximal plaque without significant stenosis ipsilateral lentiform nucleus. No bleed or shift is identified. There are chronic white matter microvascular ischemic changes characterized by periventricular white matter signal abnormality. Normal expected signal voids are present within the vessels at the base of skull. ORBITS: The visualized portion of the orbits demonstrate no acute abnormality. SINUSES: The visualized paranasal sinuses and mastoid air cells are well aerated. BONES/SOFT TISSUES: The bone marrow signal intensity appears normal. The soft tissues demonstrate no acute abnormality. Acute infarct within the right mid corona radiata. The findings were sent to the Radiology Results [address] at 5:07 pm on 8/10/2019to be communicated to a licensed caregiver. The above labs and diagnostic studies have been reviewed by myself upon admission to inpatient rehabilitation. Barriers to Discharge: Patient  has a past medical history of Hypertension. Pt lives with her son in a 2nd floor apartment. POST ADMISSION PHYSICIAN EVALUATION  The patient has agreed to being admitted to our comprehensive inpatient  rehabilitation facility consisting of at least 180 minutes of therapy a day,  5 out of 7 days a week. The patient/family has a good understanding of our discharge process. The  patient has potential to make improvement and is in need of at least two of  the following multidisciplinary therapies including but not limited to  physical, occupational, respiratory, and speech, nutritional services, wound care,   and prosthetics and orthotics. Given the patients complex condition  and risk of further medical complications, rehabilitation services cannot be  safely provided at a lower level of care such as a skilled nursing facility. I have compared the patients medical and functional status at the time of the  preadmission screening and the same on this date, and there are no significant changes.     By signing this document, I

## 2019-08-14 NOTE — PROGRESS NOTES
Speech Language Pathology  Facility/Department: [facility] 3N IP REHAB  Initial Speech/Language/Cognitive Assessment    NAME: Angelo Blunt  : 1970   MRN: [id number]  ADMISSION DATE: 2019   Date of Eval: 2019   Evaluating Therapist: Lexy Dooley. LEVON Gilbert, CCC-SLP #[id number], Speech Language Pathologist     ADMITTING DIAGNOSIS: has Acute ischemic stroke [facility]) and Right sided cerebral hemisphere cerebrovascular accident (CVA) ([facility] [address] on their problem list.  DATE ONSET: 2019    Chart Review:  · MD History and Physical  History Of Present Illness:    50 y.o. female who presented to [facility] with right facial droop. Patient developed right facial droop this morning. It persisted through the day. She had dysarthric speech and it did not improve. She had some paresthesias of the right side of her body as well. Patient finally decided to come to the hospital but unfortunately was almost 9 hours out from onset of initial symptoms and was deemed not to be a TPA candidate. Patient admits that her symptoms are improved since this morning and this is confirmed by her multiple children who accompany her in the room. She admits that her blood pressure control has not been very controlled. · RECENT RESULTS MRI BRAIN (8/10/2019): \"Acute infarct within the right mid corona radiata. \"    Patient Complaint:   · Pt with complaints of slurred speech. · MD referral for SLP evaluation due to acute ischemic stroke with concerns for cognitive-communication deficits. Assessment Impressions:  1. Cognitive Diagnosis: [place] cognition intact except for mild deficits in working memory and complex processing. Further assessment in complex cognitive linguistic skills for executive function. 2. Speech Diagnosis: Pt with mild dysarthria characterized by left sided weakness resulting in mild phonemic distortions and syllable reductions. Pt has a rapid rate of speech.  Pt's speech is audible and >90%

## 2019-08-14 NOTE — PROGRESS NOTES
(Comment)  LE Bathing: Other (Comment)(Per report of mother, pt was mod A for bathing while seated on toilet with assistance of PCA and mother to wash 4 body parts.)  UE Dressing: Moderate assistance(Pt doffed shirt with mod A to implement federico technique.)  LE Dressing: Dependent/Total(Pt given assist of 2 to arlyn pants while in stedy. )  Toileting: None  Additional Comments: Pt had completed bathing with mother and PCA prior to session. Balance  Sitting Balance: Contact guard assistance  Standing Balance: Minimal assistance  Standing Balance  Time: 1 min  Activity: LE dressing, t/f  Comment: Pt given min A to support L hand on bar of stedy when in stance. Functional Mobility  Functional - Mobility Device: Wheelchair  Activity: To/from bathroom  Functional Mobility Comments: OTS transported pt to/from bathroom using w/c. Toilet Transfers  Toilet Transfer: Dependent/Total  Toilet Transfers Comments: Per report of pt's mother -- PCA and mother assisted pt to toilet using stedy. Wheelchair [employer] Transfers  Equipment Used: Wheelchair  Level of Asssistance: Dependent/Total  Wheelchair Transfers Comments: Stedy used to t/f pt into w/c. Bed mobility  Rolling to Left: Contact guard assistance  Supine to Sit: Contact guard assistance  Scooting: Contact guard assistance  Comment: Pt moved to EOB quickly and was given CGA during bed mobility with HOB elevated. Transfers  Sit to stand: Dependent/Total;2 Person assistance  Stand to sit: Dependent/Total;2 Person assistance  Transfer Comments: Pt was given CGA of 1 for sit-to-stands and min A to maintain L hand on bar of stedy. Pt did not require physical assistance to stand or sit, was given VCs for sitting gently and positioning in stedy. Cognition  Overall Cognitive Status: Exceptions  Following Commands:  Follows one step commands consistently  Attention Span: Appears intact  Safety Judgement: Decreased awareness of need for safety  Problem federico-technique   Long term goal 3: Pt will complete bathing with close SBA and setup seated on BSC  Long term goal 4: Pt will complete UE dressing with mod I for extra time   Long term goal 5: Pt will complete LE dressing & toileting with SBA and use of A/E  Long term goals 6: Pt will tolerate standing for 8 - 10 minutes with SBA during ADL participation   Long term goal 7: Pt will attend to L hemibody with min VCs during ADL participation  Long term goal 8: Pt will complete w/c mobility for 150 feet and household t/f with SBA   Patient Goals   Patient goals :  \"To get better, to get this back (referring to L UE)\"        Therapy Time   Individual Concurrent Group Co-treatment   Time In 0815         Time Out 0945         Minutes 90         Timed Code Treatment Minutes: 8900 Jimi Doe, S/OT  Marynan Jesus OTR/L #[id number] provided direct supervision to student and concur with PN

## 2019-08-14 NOTE — PLAN OF CARE
Problem: Nutrition  Goal: Optimal nutrition therapy  Outcome: Ongoing   Nutrition Problem: Inadequate oral intake  Intervention: Food and/or Nutrient Delivery: Continue current diet, Continue current ONS  Nutritional Goals: Pt will consume >/=50% of meals this admission.

## 2019-08-14 NOTE — PROGRESS NOTES
rails, Stairs to enter without rails  Entrance Stairs - Number of Steps: 2 steps to get into building and 20 steps with B rails to apt  Bathroom Shower/Tub: Tub/Shower unit  Bathroom Toilet: Standard  ADL Assistance: Independent  Homemaking Assistance: Independent  Homemaking Responsibilities: Yes  Meal Prep Responsibility: Primary  Laundry Responsibility: Primary  Cleaning Responsibility: Primary  Bill Paying/Finance Responsibility: Primary  Shopping Responsibility: Primary  Health Care Management: Primary  Ambulation Assistance: Independent  Transfer Assistance: Independent  Active : Yes  Mode of Transportation: Car  Occupation: Full time employment  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 Additional Comments: Pt considers d/c to mother's house, which has one bathroom on second floor. Pt considering staying at mother's and getting a BSC to use as bathroom & put a bed in her living room. Objective   Vision: Impaired(L neglect. Pt attended to L visual field during evaluation w/ OTS seated on L. )  Vision Exceptions: Wears glasses for reading  Hearing: Within functional limits          Balance  Sitting Balance: Contact guard assistance  Standing Balance: Minimal assistance  Standing Balance  Time: 1 min  Activity: LE dressing, t/f  Comment: Pt given min A to support L hand on bar of stedy when in stance. Functional Mobility  Functional - Mobility Device: Wheelchair  Activity: To/from bathroom  Functional Mobility Comments: OTS transported pt to/from bathroom using w/c. Toilet Transfers  Toilet Transfer: Dependent/Total  Toilet Transfers Comments: Per report of pt's mother -- PCA and mother assisted pt to toilet using stedy.   Wheelchair [employer] Transfers  Equipment Used: Wheelchair  Level of Asssistance: Dependent/Total  Wheelchair Transfers Comments: [facility] used to t/f pt into w/c. ADL  Equipment Provided: Reacher;Long-handled sponge  Feeding: Increased time to complete;Setup(Pt provided with setup to unwrap silverware this AM. Pt on cardiac diet.)  Grooming: Increased time to complete;Supervision(Pt completed oral care seated in w/c at sink, used federico technique to remove cap of toothpaste. )  UE Bathing: Maximum assistance(Per report of mother, pt washed upper chest and L armpit will mother gave lift A to arm. Mother washed all other UE areas and provided lift A for breasts and abdominal fold. )  LE Bathing: Moderate assistance(Per report of mother, pt was mod A for bathing while seated on toilet with assistance of PCA and mother to wash 4 body parts.)  UE Dressing: Moderate assistance(Pt doffed shirt with mod A to implement federico technique.)  LE Dressing: Dependent/Total(Pt given assist of 2 to arlyn pants while in stedy. )  Toileting: Dependent/Total(Pt mother and PCA assisted with toileting prior to eval.)  Additional Comments: Pt had completed bathing with mother and PCA prior to session. Tone RUE  RUE Tone: Normotonic  Tone LUE  LUE Tone: Normotonic  Coordination  Movements Are Fluid And Coordinated: Yes(Yes, with R UE.)  Coordination and Movement description: Left UE;Gross motor impairments; Fine motor impairments(L hemiparesis.)     Bed mobility  Rolling to Left: Contact guard assistance  Supine to Sit: Contact guard assistance  Scooting: Contact guard assistance  Comment: Pt moved to EOB quickly and was given CGA during bed mobility with HOB elevated. Transfers  Sit to stand: Dependent/Total;2 Person assistance  Stand to sit: Dependent/Total;2 Person assistance  Transfer Comments: Pt was given CGA of 1 for sit-to-stands and min A to maintain L hand on bar of stedy. Pt did not require lift assistance to stand or sit, was given VCs for sitting gently and positioning in stedy.      Cognition  Overall Cognitive Status:

## 2019-08-15 PROCEDURE — 97127 HC SP THER IVNTJ W/FOCUS COG FUNCJ: CPT

## 2019-08-15 PROCEDURE — 97530 THERAPEUTIC ACTIVITIES: CPT

## 2019-08-15 PROCEDURE — 6370000000 HC RX 637 (ALT 250 FOR IP): Performed by: PHYSICAL MEDICINE & REHABILITATION

## 2019-08-15 PROCEDURE — 97110 THERAPEUTIC EXERCISES: CPT

## 2019-08-15 PROCEDURE — 6360000002 HC RX W HCPCS: Performed by: PHYSICAL MEDICINE & REHABILITATION

## 2019-08-15 PROCEDURE — 1280000000 HC REHAB R&B

## 2019-08-15 PROCEDURE — 97110 THERAPEUTIC EXERCISES: CPT | Performed by: PHYSICAL THERAPIST

## 2019-08-15 PROCEDURE — 97112 NEUROMUSCULAR REEDUCATION: CPT

## 2019-08-15 PROCEDURE — 97542 WHEELCHAIR MNGMENT TRAINING: CPT | Performed by: PHYSICAL THERAPIST

## 2019-08-15 PROCEDURE — 92526 ORAL FUNCTION THERAPY: CPT

## 2019-08-15 PROCEDURE — 97530 THERAPEUTIC ACTIVITIES: CPT | Performed by: PHYSICAL THERAPIST

## 2019-08-15 PROCEDURE — 97116 GAIT TRAINING THERAPY: CPT | Performed by: PHYSICAL THERAPIST

## 2019-08-15 RX ADMIN — PANTOPRAZOLE SODIUM 40 MG: 40 TABLET, DELAYED RELEASE ORAL at 05:16

## 2019-08-15 RX ADMIN — CLOPIDOGREL BISULFATE 75 MG: 75 TABLET ORAL at 09:49

## 2019-08-15 RX ADMIN — AMLODIPINE BESYLATE 10 MG: 10 TABLET ORAL at 09:49

## 2019-08-15 RX ADMIN — SENNOSIDES, DOCUSATE SODIUM 2 TABLET: 50; 8.6 TABLET, FILM COATED ORAL at 09:49

## 2019-08-15 RX ADMIN — ENOXAPARIN SODIUM 40 MG: 40 INJECTION SUBCUTANEOUS at 09:49

## 2019-08-15 RX ADMIN — ASPIRIN 81 MG: 81 TABLET ORAL at 09:49

## 2019-08-15 RX ADMIN — POTASSIUM CHLORIDE 10 MEQ: 750 TABLET, EXTENDED RELEASE ORAL at 09:49

## 2019-08-15 RX ADMIN — SENNOSIDES, DOCUSATE SODIUM 2 TABLET: 50; 8.6 TABLET, FILM COATED ORAL at 21:59

## 2019-08-15 RX ADMIN — ATORVASTATIN CALCIUM 80 MG: 80 TABLET, FILM COATED ORAL at 21:59

## 2019-08-15 ASSESSMENT — PAIN SCALES - GENERAL
PAINLEVEL_OUTOF10: 0
PAINLEVEL_OUTOF10: 0

## 2019-08-15 NOTE — PROGRESS NOTES
assist n/a   Goal 3: The pt will particiapte in further assessment of higher level cogntiive linguistic skills for advanced daily living executive function  GIOVANNY (norms for <72years of age)  -Counting Money: score profile of '1' high probability of IND  -Solving Daily Math Problems: score profile of '1' high probability of IND  -Understanding Med Labels: score profile of '1' high probability of IND n/a   Other areas targeted: Pending pt schedule, may attempt this am around 1130 am    Education:   Ongoing pt/family (parent) ed Ongoing pt /family ed   Safety Devices: [x] Call light within reach  [x] Chair alarm activated  [] Bed alarm activated  [] Other: [x] Call light within reach  [] Chair alarm activated  [x] Bed alarm activated  [x] Other: family at bedside   Progress Assessment: 8/15/2019: Pt/family ed in oral motor ex program and swallow strategies/ex. [place] math for counting money intact on testing; minimal to mild complex math word problems intact on testing. Plan: Continue as per plan of care. Continued Tx Upon Discharge: ?    [] Yes [] No [] TBD based on progress while on ARU [] Vital Stim indicated [] Other:   Estimated discharge date:    Discharge recommendations:   [] Home independently  [] Home with assistance []  24 hour supervision  [] ECF [] Other:     Additional information:   Interventions used during Rehab Stay:  [] Speech/Language Treatment  [] Instruction in HEP [] Group [x] Dysphagia Treatment [x] Cognitive Treatment   [] Other:    Adverse Reactions to Treatment/Significant Change in Status: n/a    Electronically Signed by    Maikol Kruse Graduate SLP Student    This Speech Language Pathologist was present directed the patient's care, made skilled judgement and was responsible for assessment and treatment. Lisa Gilbert,MS,CCC,SLP [id number]  Speech and Language Pathologist

## 2019-08-15 NOTE — PLAN OF CARE
Use: 2 of 4    Patient Verbalized Understanding: Yes  Family Present: No    Electronically signed by Haredep Mathis RN on 8/15/2019 at 10:50 AM [address] [place] [address] Avenue:  [phone]  AGE: 50 y.o. GENDER: female  : 1970  TODAYS DATE:  8/15/2019    Assessment     Previous Falls: No    Patient Alert: yes  Patient Oriented: oriented in all spheres    Level of Assistance: Maximum Assist  Device in Use: Wheelchair    Stephens Fall Score: Score: 50     in Use: N/A     Prevention Plan     Nursing Actions Taken: Ivy Bettencourt all that apply)    ? Belongings in Reach according to Patient Preference  ? Call Light within Reach and Patient Reminded to Call for Assistance  ? Shoes on Patient  ? Non-Skid Socks on Patient  ? Hourly Rounding Performed  ? Bed Alarm on Patient  ? Chair Alarm on Patient  ? Other:    Bed Rails in Use: 2 of 4    Patient Verbalized Understanding: Yes  Family Present: Yes    Electronically signed by Hardeep Mathis RN on 8/15/2019 at 10:50 AM     Goal: Absence of physical injury  Description  Absence of physical injury  Outcome: Ongoing     Problem: ACTIVITY INTOLERANCE/IMPAIRED MOBILITY  Goal: Mobility/activity is maintained at optimum level for patient  Outcome: Ongoing     Problem: HEMODYNAMIC STATUS  Goal: Patient has stable vital signs and fluid balance  Outcome: Ongoing     Problem: Pain:  Goal: Pain level will decrease  Description  Pain level will decrease  Outcome: Ongoing  Note:   Denies c/o pain; continue to monitor; position for comfort.    Goal: Control of acute pain  Description  Control of acute pain  Outcome: Ongoing  Goal: Control of chronic pain  Description  Control of chronic pain  Outcome: Ongoing     Problem: Nutrition  Goal: Optimal nutrition therapy  Outcome: Ongoing

## 2019-08-15 NOTE — PROGRESS NOTES
Cognition  Overall Cognitive Status: Exceptions  Following Commands: Follows one step commands consistently  Attention Span: Appears intact  Safety Judgement: Decreased awareness of need for safety  Problem Solving: (Pt demonstrated problem solving using one-handed techniques throughout ADLs.)  Initiation: Requires cues for some  Sequencing: Requires cues for some  Cognition Comment: Pt's speech mildly slurred, especially with increased fatigue. Pt emotional labile. Pt with mild L neglect demonstrated. Perception  Overall Perceptual Status: Impaired  Unilateral Attention: Cues to attend left visual field              Type of ROM/Therapeutic Exercise  Type of ROM/Therapeutic Exercise: AAROM;AROM  Comment: Pt and mother provided with education on supporting L UE under elbow and at the wrist during ROM exercises. Exercises  Scapular Protraction: x20 with tapping for muscle activation  Scapular Retraction: x20 with tapping for muscle activation  Shoulder Depression: x20 with mild compensation to raise L side  Shoulder Elevation: x20 with mild compensation to raise L side  Shoulder Flexion: x20 AAROM to 120 degrees   Shoulder Extension: x20 AAROM   Shoulder ABduction: x20 AAROM to 120 degrees   Shoulder ADduction: x20 AAROM   Elbow Flexion: x20 AAROM   Elbow Extension: x20 AAROM                     Plan   Plan  Times per week: 5 - 7  Times per day: Twice a day  Plan weeks: 3 - 4 weeks  Current Treatment Recommendations: Strengthening, ROM, Balance Training, Functional Mobility Training, Endurance Training, Neuromuscular Re-education, Equipment Evaluation, Education, & procurement, Self-Care / ADL, Patient/Caregiver Education & Training, Safety Education & Training, Positioning  Plan Comment: MON conf    Goals  Short term goals  Time Frame for Short term goals:  In 1 - 2 weeks,   Short term goal 1: Pt will complete feeding with setup of containers  Short term goal 2: Pt will complete grooming seated with min A

## 2019-08-15 NOTE — PROGRESS NOTES
Department of [employer] Progress Note    Patient Identification:  Piotr Sutton  [id number]  : 1970  Admit date: 2019      Diagnosis:   Patient Active Problem List   Diagnosis    Acute ischemic stroke [facility])    Right sided cerebral hemisphere cerebrovascular accident (CVA) ([facility] [address]           Subjective: Pt seen this AM. Patient worked in therapies and improving in her left-sided strength transfers, and self-care activities. Repeat labs yesterday look good and are stable. Her blood pressure is still running high, and this will be monitored. Patient noticed some movement in her left hand for the first time today working in therapies. She is excited about seeing movement in her left upper extremity. She states she is working hard in therapies, noticing improvement already. BP (!) 146/98   Pulse 84   Temp 97 °F (36.1 °C) (Oral)   Resp 16   Ht 5' 7\" (1.702 m)   Wt 212 lb 15.4 oz (96.6 kg)   LMP 2019   SpO2 95%   BMI 33.35 kg/m²     Last 24 hour lab  No results found for this or any previous visit (from the past 24 hour(s)). Therapy progress:  PT  Position Activity Restriction  Other position/activity restrictions: Impulsive, mild left neglect. Cardiac diet. Objective     Sit to Stand: Minimal Assistance  Stand to sit: Minimal Assistance  Device: [employer] rail)  Other Apparatus: Wheelchair follow  Assistance: Minimal assistance  Distance: 15' x 2  OT  PT Equipment Recommendations  Other: will continue to assess for LRAD  Toilet Transfers Comments: Per report of pt's mother -- PCA and mother assisted pt to toilet using stedy. SLP:   Cognitive Diagnosis: [place] cogntion intact except for mild deficits in working memory and complex processing. Further assessment in complex cognitive linuistic skills for executive function.    Speech Diagnosis: Pt with mild dysarthria characterized by left sided weakess resulting in mild phonemic distortions and syllable reductions. Pt has a rapid rate of speech. Pt's speech is adible and >90% intellgibile. Communication Diagnosis: Pt with deficits in complex processing. This may be pre-morbid. Ongoing assessment warranted. Dysphagia Diagnosis: Mild to moderate oral stage dysphagia, Mild pharyngeal stage dysphagia  Dysphagia Outcome Severity Scale: Level 5: Mild dysphagia- Distant supervision. May need one diet consistency restricted       Assessment and Plan:        Acute infarct within the right mid corona radiata -Plavix, aspirin      Hypertensive urgency with a hx of HTN -norvasc, hydralazine prn     Mixed hyperlipidemia- Lipitor     Obesity with BMI of 36.43     Bowels: Schedule Miralax + Senna S. Follow bowel movements. Enema or suppository if needed.      Bladder: Check PVR x 3.   [facility] if PVR > 200ml or if any volume is > 500 ml.      Pain: Tylenol is ordered prn.        Shanika Torres MD, 8/15/2019, 7:17 AM

## 2019-08-16 PROCEDURE — 97127 HC SP THER IVNTJ W/FOCUS COG FUNCJ: CPT

## 2019-08-16 PROCEDURE — 97116 GAIT TRAINING THERAPY: CPT

## 2019-08-16 PROCEDURE — 97530 THERAPEUTIC ACTIVITIES: CPT

## 2019-08-16 PROCEDURE — 97110 THERAPEUTIC EXERCISES: CPT

## 2019-08-16 PROCEDURE — 6370000000 HC RX 637 (ALT 250 FOR IP): Performed by: PHYSICAL MEDICINE & REHABILITATION

## 2019-08-16 PROCEDURE — 97535 SELF CARE MNGMENT TRAINING: CPT

## 2019-08-16 PROCEDURE — 94760 N-INVAS EAR/PLS OXIMETRY 1: CPT

## 2019-08-16 PROCEDURE — 92507 TX SP LANG VOICE COMM INDIV: CPT

## 2019-08-16 PROCEDURE — 6360000002 HC RX W HCPCS: Performed by: PHYSICAL MEDICINE & REHABILITATION

## 2019-08-16 PROCEDURE — 92526 ORAL FUNCTION THERAPY: CPT

## 2019-08-16 PROCEDURE — 1280000000 HC REHAB R&B

## 2019-08-16 RX ORDER — LISINOPRIL 10 MG/1
10 TABLET ORAL DAILY
Status: DISCONTINUED | OUTPATIENT
Start: 2019-08-16 | End: 2019-08-19

## 2019-08-16 RX ADMIN — SENNOSIDES, DOCUSATE SODIUM 2 TABLET: 50; 8.6 TABLET, FILM COATED ORAL at 08:37

## 2019-08-16 RX ADMIN — ENOXAPARIN SODIUM 40 MG: 40 INJECTION SUBCUTANEOUS at 08:37

## 2019-08-16 RX ADMIN — ASPIRIN 81 MG: 81 TABLET ORAL at 08:37

## 2019-08-16 RX ADMIN — ATORVASTATIN CALCIUM 80 MG: 80 TABLET, FILM COATED ORAL at 21:39

## 2019-08-16 RX ADMIN — AMLODIPINE BESYLATE 10 MG: 10 TABLET ORAL at 08:37

## 2019-08-16 RX ADMIN — LISINOPRIL 10 MG: 10 TABLET ORAL at 14:51

## 2019-08-16 RX ADMIN — POTASSIUM CHLORIDE 10 MEQ: 750 TABLET, EXTENDED RELEASE ORAL at 08:37

## 2019-08-16 RX ADMIN — HYDRALAZINE HYDROCHLORIDE 25 MG: 25 TABLET, FILM COATED ORAL at 06:30

## 2019-08-16 RX ADMIN — CLOPIDOGREL BISULFATE 75 MG: 75 TABLET ORAL at 08:37

## 2019-08-16 RX ADMIN — PANTOPRAZOLE SODIUM 40 MG: 40 TABLET, DELAYED RELEASE ORAL at 06:30

## 2019-08-16 ASSESSMENT — PAIN SCALES - GENERAL
PAINLEVEL_OUTOF10: 0
PAINLEVEL_OUTOF10: 0

## 2019-08-16 NOTE — PLAN OF CARE
Problem: Falls - Risk of:  Goal: Will remain free from falls  Description  Will remain free from falls  Outcome: Ongoing   Fall precautions in place. Room free of clutter. Call light with in reach. Bed low position with wheels locked. Non skid footwear on. Bed exit alarm activated. Intentional rounding to anticipate needs. Problem: ACTIVITY INTOLERANCE/IMPAIRED MOBILITY  Goal: Mobility/activity is maintained at optimum level for patient  Outcome: Ongoing   Provide support to flaccid left arm when transferring. Provide extra time when transferring.

## 2019-08-16 NOTE — PATIENT CARE CONFERENCE
[facility]  Inpatient Rehabilitation  Weekly Team Conference Note      Date: 2019  Patient Name:  Carlos Alberto Sharp    MRN: [id number]  : 1970  Gender: female  Physician: Dr Monserrat Mckeon   Diagnosis: Right sided cerebral hemisphere cerebrovascular accident (CVA) [facility]) [I63.9]    CASE MANAGEMENT  Assessment:    Goal is home. PHYSICAL THERAPY    Bed mobility  Bridging: Contact guard assistance(to support L foot)  Rolling to Left: Stand by assistance(with cues for technique)  Rolling to Right: Stand by assistance(with cues for technique)  Supine to Sit: Stand by assistance(with cues for technique)  Sit to Supine: Stand by assistance(with cues for technique)  Scooting: Stand by assistance(with cues for technique)  Comment: on flat mat with 2 pillows on R    Transfers:  Sit to Stand: Minimal Assistance  Stand to sit: Contact guard assistance  Comment: cues for hand placement, verbal and tactile cues after visual demonstration for turning and backing up    Ambulation 1  Surface: level tile  Device: Hemiwalker  Other Apparatus: (Bobath sling L UE)  Assistance: Contact guard assistance  Quality of Gait: cues to leave enough room for the hemiwalker when turning  Distance: 80'  Comments: after walking pt reported some vision disturbance but could not specify: was not seeing specks or having blurry vision but R eye did not feel right.   BP = 154/100, HR = 109, O2 = 94%, notified RN of BP    Stairs  # Steps : 8  Stairs Height: 6\"  Rails: Right ascending  Assistance: Minimal assistance      FIMS:  Bed, Chair, Wheel Chair: 2 - Requires 50-74% assistance to transfer  Walk: 2 - Maximal Assistance Requires up to Maximal Assistance AND requires assistance of one person to walk between  feet (Patient performs 25-49% of locomotion effort or goes between  feet)  Distance Walked: 20'  Wheel Chair: 1 - [address] Operates wheelchair < 50 feet OR requires two or more people OR patient performs < 25% of locomotion effort  Distance Traveled in Wheel Chair: 25'  Stairs: 2- Maximal Assistance Performs 25-49% of the effort to go up and down 4 to 6 stairs and requires the assistance of one person only    PT Equipment Recommendations  Other: will continue to assess for least restrictive assistive device (LRAD)    Assessment: Patient continues to be driven to improve, but needs sufficient rest breaks due to fatigue. She is used to working third shift and will need to allow some time to adjust her schedule. She was able to transfer with min A and ambulate with hemiwalker for 29' with CGA. The motor control in her left arm is significantly more impaired than her left leg. Bed mobility was imroved to SBA with cues for technique. Attempted WC mobility, but complained of cramping in her R hamstrings and required several rest breaks, including assitance to stretch her hamstrings. She would benefit from continue intensive therapies for 2-3 weeks on inpatient rehab to improve her L-sided weakness, increase safety awareness, and maximize her independence performing all mobility tasks. SPEECH THERAPY (intentionally left blank if not actively being seen by this service):  FIMS:  Comprehension: 6 - Complex ideas 90% or device (hearing aid or glasses- if patient is primarily a visual learner)  Expression: 6 - Device used to express complex ideas/needs  Social Interaction: 6 - Patient requires medication for mood and/or effect  Problem Solvin - Patient able to solve simple/routine tasks  Memory: 5 - Patient requires prompting with stress/unfamiliar situations    Assessment: Pt with Oropharyngeal Dysphagia and Dysarthria 2/2 to left oral  Motor muscular weakness. Dysphagia is characterized by reduced bolus control and delayed initiation of swallows with symptoms of premature loss of mixed food/liquid consistencies and oral pocketing of foods.  Pt with improvement in left oral motor muscular engagement during

## 2019-08-16 NOTE — PROGRESS NOTES
diagnoses. has a past medical history of Hypertension. has a past surgical history that includes  section. Restrictions  Restrictions/Precautions  Restrictions/Precautions: Fall Risk  Position Activity Restriction  Other position/activity restrictions: Impulsive, mild left neglect. Cardiac diet. Subjective   General  Chart Reviewed: Yes  Additional Pertinent Hx: Per Dr. Geovanna Cota, , \"51 y.o. female who presented with facial weakness and progressed to having left face and arm weakness in setting of hypertension overnight. CTA with bilateral soft plaque at bifurcation. Exam with left face and arm weakness much more than leg and right gaze preference suggesting of cortical right MCA stroke. Patient found to have thrombotic right MCA stroke on MRI in deep right corona radiata area. Patient started on DAPT with aspirin and Plavix, blood pressure medication, and patient was started in therapies. Patient is well below her baseline function of being Ind without an AD and working full time; pt currently is needing assist of 1-2 for mobility tasks and needing pedrito stedy lift for transfers with nursing and A of 2 for short distance gait with hemiwalker. Patient needs more therapy before discharge to home, where she lives with her son in a 2nd floor apartment. Patient was approved by her insurance for rehab unit admission today. She is agreeable to Rehab Unit treatment\"  Response To Previous Treatment: Patient with no complaints from previous session.   Family / Caregiver Present: Yes(mother)  Referring Practitioner: Dr. Jaimie Phelps: Patient very talkative, reports she is in a better mood today and is ready to do well  Pain Screening  Patient Currently in Pain: Denies  Vital Signs  Patient Currently in Pain: Denies       Orientation  Orientation  Overall Orientation Status: Within Functional Limits  Cognition      Objective   Bed mobility  Bridging: Contact guard assistance(to support L [address] [place] [address], [address] [facility]

## 2019-08-16 NOTE — PROGRESS NOTES
SBA during ADL participation   Long term goal 7: Pt will attend to L hemibody with min VCs during ADL participation  Long term goal 8: Pt will complete w/c mobility for 150 feet and household t/f with SBA   Patient Goals   Patient goals :  \"To get better, to get this back (referring to L UE)\"        Therapy Time   Individual Concurrent Group Co-treatment   Time In 0842         Time Out 0945         Minutes 63         Timed Code Treatment Minutes: 63 Minutes  Therapy Time     Individual Co-treatment   Time In 1300     Time Out 1345     Minutes [address] [place], [address] [address]

## 2019-08-16 NOTE — PROGRESS NOTES
Admitted with CVA. Alert and oriented x 4. Speech slightly slurred. Medications taken one at time , large pills split in half , taken with thin liquids. LUE flaccid, left leg weak . Transfers with use of pedrito stedy. Dependent with toileting. She likes to do 3-4 sit to stand to sit before being transferred \" like to get my exercise\". Mother at bedside overnight for support.  Nikkie Porter RN

## 2019-08-16 NOTE — PROGRESS NOTES
Calorie Oral Supplement   Communication: []WFL  [] Aphasia  [x] Dysarthria  [] Apraxia  [] Pragmatic Impairment [] Non-verbal  [] Hearing Loss  [x] Other: Most optimal for concrete  Cognition: [] WFL  [x] Mild  [] Moderate  [] Severe [] Unable to Assess  [] Other:  Memory: [x] WFL  [x] Mild  [] Moderate  [] Severe [] Unable to Assess  [] Other:  Behavior: [x] Alert  [x] Cooperative  [x]  Pleasant  [] Confused  [] Agitated  [] Uncooperative  [] Distractible [] Motivated  [] Self-Limiting [] Anxious  [x] Other: self reports anxious/nervous at times; can be impulsive  Endurance:  [x] Adequate for participation in SLP sessions  [] Reduced overall  [] Lethargic  [] Other:  Safety: [x] No concerns at this time  [] Reduced insight into deficits  []  Reduced safety awareness [] Not following call light procedures  [] Unable to Assess  [] Other:  Swallowing Precautions: Sit up for all meals and thereafter for 30 minutes, Eat with small bites (1/2 tsp; 1 tsp), Dry swallow after each bite/sip, Check mouth for food residue after snacks and meals and Take your medication with apple sauce  Barriers toward progress: caregiver support may be a barrier         Date: 8/16/2019      Tx session 1 Tx session 2   Total Timed Code Min SLP Individual Minutes  Time In: 6408  Time Out: 8289  Minutes: 45  Coded treatment time  10  n/a   Group Treatment Minutes 0 0   Co-Treat Minutes 0 0   Variance/Reason:  n/a n/a   Pain n/a n/a   Pain Intervention [] RN notified  [] Repositioned  [] Intervention offered and patient declined  [] N/A  [] Other: [] RN notified  [] Repositioned  [] Intervention offered and patient declined  [] N/A  [] Other:   Subjective     Pt seen in treatment office  Pt's maternal parent present for session  Pt with good effort  Ongoing pt/family ed    Objective:  Goals      Pt goal is to \"eat without fear of choking and without biting left cheek and tongue\"  Therapy working toward pt goal    Dysphagia Goals:   1.  The patient back to work and to get my speech back, it gets so slurred when I am tired\" Therapy working toward pt goal      Goal 1: Pt will demonstrate improved articulatory precision via oral motor exercises over 90%. Emphasized reduced speech rate and exaggeration of o-m movements during oral reading tasks and structured speech tasks. -use of inhibition so as to achieve improve left o-m rom during structured speech tasks  -use of tactile stimulation when target phonemes emphasized  (ie lip rounding for /w/; /o/; /u/; lip protrusion for \"sh\"; /ch/; /dg/; lip retraction for /I/; /s/; /z/; at word level) n/a   Goal 2: Pt will demonstrate improved working memory for new learning techniques; new information; and rule application with set up, use of compensatory strategies and intermittent assist.  Working memory for :   -rule application for 13-93 task trials: 0- mild re-direct (distractible)    to intermittent minimal assist    -recall of dysphagia strategies from 8/15/2019: set up and prompting warranted    -recall of labial/buccal/lingual ex: set up prompt for pt to identify 2    -recall of oral motor speech strategies: IND n/a   Goal 3: The pt will particiapte in further assessment of higher level cogntiive linguistic skills for advanced daily living executive function  Emphasizing recall of new learning and sustained attention for rule application. Pt can be impulsive and distractible; but easily re-directed n/a   Other areas targeted:     Education:   Ongoing pt/family (parent) ed    Safety Devices: [] Call light within reach  [] Chair alarm activated  [] Bed alarm activated  [x] Other: transport returned pt to room [x] Call light within reach  [] Chair alarm activated  [] Bed alarm activated  [] Other:    Progress Assessment: 8/15/2019: Pt/family ed in oral motor ex program and swallow strategies/ex. [place] math for counting money intact on testing; minimal to mild complex math word problems intact on testing.

## 2019-08-16 NOTE — PROGRESS NOTES
50 yr old female admitted to ARU s/p CVA with LUE flaccid and LLE weakness. Noted with left facial droop and some speech slurring as well. Tolerating po diet as well as thin liquids. Continues on lovenox as ordered. Denies c/o pain and patient positioned for comfort. ADL's per therapy this am. Call light in reach. Family remains at bedside.

## 2019-08-17 PROCEDURE — 97530 THERAPEUTIC ACTIVITIES: CPT

## 2019-08-17 PROCEDURE — 1280000000 HC REHAB R&B

## 2019-08-17 PROCEDURE — 97116 GAIT TRAINING THERAPY: CPT

## 2019-08-17 PROCEDURE — 6370000000 HC RX 637 (ALT 250 FOR IP): Performed by: PHYSICAL MEDICINE & REHABILITATION

## 2019-08-17 PROCEDURE — 97110 THERAPEUTIC EXERCISES: CPT

## 2019-08-17 PROCEDURE — 97112 NEUROMUSCULAR REEDUCATION: CPT

## 2019-08-17 PROCEDURE — 6360000002 HC RX W HCPCS: Performed by: PHYSICAL MEDICINE & REHABILITATION

## 2019-08-17 PROCEDURE — 97535 SELF CARE MNGMENT TRAINING: CPT

## 2019-08-17 RX ADMIN — AMLODIPINE BESYLATE 10 MG: 10 TABLET ORAL at 10:11

## 2019-08-17 RX ADMIN — POTASSIUM CHLORIDE 10 MEQ: 750 TABLET, EXTENDED RELEASE ORAL at 10:11

## 2019-08-17 RX ADMIN — ASPIRIN 81 MG: 81 TABLET ORAL at 10:11

## 2019-08-17 RX ADMIN — CLOPIDOGREL BISULFATE 75 MG: 75 TABLET ORAL at 10:11

## 2019-08-17 RX ADMIN — ATORVASTATIN CALCIUM 80 MG: 80 TABLET, FILM COATED ORAL at 21:25

## 2019-08-17 RX ADMIN — LISINOPRIL 10 MG: 10 TABLET ORAL at 10:31

## 2019-08-17 RX ADMIN — PANTOPRAZOLE SODIUM 40 MG: 40 TABLET, DELAYED RELEASE ORAL at 06:14

## 2019-08-17 RX ADMIN — ENOXAPARIN SODIUM 40 MG: 40 INJECTION SUBCUTANEOUS at 10:12

## 2019-08-17 ASSESSMENT — PAIN SCALES - GENERAL
PAINLEVEL_OUTOF10: 0
PAINLEVEL_OUTOF10: 0

## 2019-08-17 NOTE — PLAN OF CARE
Problem: Risk for Impaired Skin Integrity  Goal: Tissue integrity - skin and mucous membranes  Description  Structural intactness and normal physiological function of skin and  mucous membranes. 8/17/2019 0800 by Melissa Trinidad RN  Outcome: Ongoing  Note:   Freddie score assessed. Patient able to ambulate and turn self. Repositioned patient Q2H and assessed skin. Educated patient on importance of repositioning to prevent skin issues. 8/17/2019 0223 by Beth Clarke RN  Outcome: Ongoing  Note:   Skin assessment performed each shift per protocol. Patient turns and repositions self slightly on her own. Patient is continent of bowel and bladder. Bottom remains intact without redness. Problem: Falls - Risk of:  Goal: Will remain free from falls  Description  Will remain free from falls  8/17/2019 0800 by Melissa Trinidad RN  Outcome: Ongoing  Note:   Fall risk assessment completed . Fall precautions in place, bed alarm on, side rails 2/4 up, call light in reach, educated pt on calling for assistance when needed, room clear of clutter. Pt verbalized understanding. 8/17/2019 0223 by Beth Clarke RN  Outcome: Ongoing  Note:   Patient free from falls this shift. Fall precautions in place at all times. Bed in lowest position with two side rails up and wheels locked. Call light within reach. Patient able and agreeable to contact for safety appropriately. Bed alarm in use.   Patient up with steady and 1 assist.    Goal: Absence of physical injury  Description  Absence of physical injury  8/17/2019 0800 by Melissa Trinidad RN  Outcome: Ongoing  8/17/2019 0223 by Beth Clarke RN  Outcome: Ongoing     Problem: ACTIVITY INTOLERANCE/IMPAIRED MOBILITY  Goal: Mobility/activity is maintained at optimum level for patient  8/17/2019 0800 by Melissa Trinidad RN  Outcome: Ongoing  8/17/2019 0223 by Beth Clarke RN  Outcome: Ongoing     Problem: HEMODYNAMIC STATUS  Goal: Patient has

## 2019-08-17 NOTE — PROGRESS NOTES
Occupational Therapy  Facility/Department: [address] IP REHAB  Daily Treatment Note  NAME: Veronica Velázquez  : 1970  MRN: [id number]    Date of Service: 2019    Discharge Recommendations:  Continue to assess pending progress  OT Equipment Recommendations  Other: TBD pending pt progress    Assessment   Performance deficits / Impairments: Decreased functional mobility ; Decreased cognition;Decreased ADL status; Decreased ROM; Decreased strength;Decreased balance;Decreased sensation;Decreased coordination;Decreased fine motor control;Decreased high-level IADLs  Assessment: Pt tolerated tx session well. Pt completed toileting total A, seated UB bathing mod A, LB bathing mod A, UB dressing mod A, LB dressing total A. Pt completed bed mobility SBA, functional transfers with [employer] lift equipment. Continue per OT POC  Treatment Diagnosis: Impaired ADLs and functional mobility w/ L hemiparesis and L neglect   Prognosis: Good  OT Education: ADL Adaptive Strategies;Transfer Training  REQUIRES OT FOLLOW UP: Yes  Activity Tolerance  Activity Tolerance: Patient Tolerated treatment well  Activity Tolerance: does report fatigue toward end of session  Safety Devices  Safety Devices in place: Yes  Type of devices: Left in chair;Nurse notified;Gait belt;Call light within reach; Patient at risk for falls; Chair alarm in place         Patient Diagnosis(es): There were no encounter diagnoses. has a past medical history of Hypertension. has a past surgical history that includes  section. Restrictions  Restrictions/Precautions  Restrictions/Precautions: Fall Risk  Position Activity Restriction  Other position/activity restrictions: Impulsive, mild left neglect. Cardiac diet. Subjective   General  Chart Reviewed: Yes  Patient assessed for rehabilitation services?: Yes  Additional Pertinent Hx: Pt is a 50 y.o. female who presented to hospital d/t facial drooping and UE parathesia. PMH includes: HTN.    Response to previous treatment: Patient with no complaints from previous session  Family / Caregiver Present: Yes  Referring Practitioner: MD Claire  Diagnosis: CVA  Subjective  Subjective: Pt met bedside for OT ADL session. Pt pleasant and agreeable to therapy. Pt denies pain  General Comment  Comments: Agreeable for OT intervention to address UE      Orientation  Orientation  Overall Orientation Status: Within Functional Limits  Objective    ADL  Equipment Provided: Reacher;Long-handled sponge  Feeding: Setup; Increased time to complete(Pt eating supine in bed, HOB elevated, upon OT arrival)  Grooming: Increased time to complete;Supervision(Pt completed oral care, washed face at the sink (in [place]) with setup, supv)  UE Bathing: Moderate assistance(use of LH sponge for underarms, upper back)  LE Bathing: Moderate assistance(use of LH sponge)  UE Dressing: Moderate assistance(don shirt using federico-dressing technique)  LE Dressing: Maximum assistance(pt requires max A for LB dressing d/t fatigue)  Toileting: Dependent/Total(to void at toilet)        Balance  Sitting Balance: Stand by assistance(EOB)  Standing Balance: Contact guard assistance(in [address])  Functional Mobility  Functional - Mobility Device: Ross Clause)  Activity: To/from bathroom  Assist Level: Dependent/Total  Functional Mobility Comments: [address] transfer from EOB > toilet > shower chair > recliner  Toilet Transfers  Toilet - Technique: Ambulating  Equipment Used: Standard toilet  Toilet Transfer: Dependent/Total  Toilet Transfers Comments: to/from [address] [facility] - Transfer To:  Shower seat with back  Shower Transfers: Dependent  Shower Transfers Comments: pedrito sher > < shower chair  Bed mobility  Supine to Sit: Stand by assistance(HOB elevated)  Scooting: Stand by assistance(extra time )  Transfers  Sit to stand: Contact guard assistance  Stand to sit: Contact guard assistance  Transfer Comments: min A/CGA to/from [address] Cognition  Cognition Comment: very talkative, frequent redirection required               PM Session  Pt met bedside for OT second session this PM to complete therex. Pt completed PROM (L) and AROM (R) supine in bed (HOB elevated): shoulder flexion/extension 10 reps x2, elbow flexion/extension 15 reps, wrist flexion/extension 15 reps, chest press 15 reps, finger grasp/release 10 reps. Pt required total Ax2 for bed mobility to scoot to HOB in supine. Pt more lethargic this session as just completed PT session, almost falling asleep at times. Pt does remain hardworking and motivated. Continue per OT POC  Total time: 15 mins                       Plan   Plan  Times per week: 5 - 7  Times per day: Twice a day  Plan weeks: 3 - 4 weeks  Current Treatment Recommendations: Strengthening, ROM, Balance Training, Functional Mobility Training, Endurance Training, Neuromuscular Re-education, Equipment Evaluation, Education, & procurement, Self-Care / ADL, Patient/Caregiver Education & Training, Safety Education & Training, Positioning  Plan Comment: MON conf    AM-PAC Score             Goals  Short term goals  Time Frame for Short term goals: In 1 - 2 weeks,   Short term goal 1: Pt will complete feeding with setup of containers  Short term goal 2: Pt will complete grooming seated with min A   Short term goal 3: Pt will complete bathing with min A seated on BSC  Short term goal 4: Pt will complete UE dressing with min A and use of federico techniques   Short term goal 5: Pt will complete LE dressing with min A and use of A/E  Short term goal 6: Pt will complete toileting with min A   Short term goal 7: Pt will complete household t/f with min A (anticipate start with stand pivot t/f using least restrictive AD)  Long term goals  Time Frame for Long term goals :  In 3 - 4 weeks,  Long term goal 1: Pt will complete feeding with mod I for extra time to open containers  Long term goal 2: Pt will complete grooming seated with mod I for extra time using federico-technique   Long term goal 3: Pt will complete bathing with close SBA and setup seated on BSC  Long term goal 4: Pt will complete UE dressing with mod I for extra time   Long term goal 5: Pt will complete LE dressing & toileting with SBA and use of A/E  Long term goals 6: Pt will tolerate standing for 8 - 10 minutes with SBA during ADL participation   Long term goal 7: Pt will attend to L hemibody with min VCs during ADL participation  Long term goal 8: Pt will complete w/c mobility for 150 feet and household t/f with SBA   Patient Goals   Patient goals : \"To get better, to get this back (referring to L UE)\"        Therapy Time   Individual Concurrent Group Co-treatment   Time In 0900         Time Out 1015         Minutes 75         Timed Code Treatment Minutes: 75 [address]   Time In 1194    Time Out 1510    Minutes 15        This note to serve as OT d/c summary if pt is d/c-ed prior to next therapy session.     Malcolm Angela, OTR/L [id number]

## 2019-08-17 NOTE — PROGRESS NOTES
Patient developing a cough that she attributes to the addition of lisinopril. States she will speak with the doctor.

## 2019-08-17 NOTE — PROGRESS NOTES
Checking on patient Q2H for nutrition needs, hygiene needs, comfort measures, mobility, fall risk interventions, and safe environment. All precautions and interventions in place. Educated patient on use of call light and telephone. Patient verbalizes understanding. Call light/telephone in reach.   Electronically signed by Marjan Rankin RN on 8/17/2019 at 8:01 AM

## 2019-08-17 NOTE — PLAN OF CARE
Problem: Risk for Impaired Skin Integrity  Goal: Tissue integrity - skin and mucous membranes  Description  Structural intactness and normal physiological function of skin and  mucous membranes. Outcome: Ongoing  Note:   Skin assessment performed each shift per protocol. Patient turns and repositions self slightly on her own. Patient is continent of bowel and bladder. Bottom remains intact without redness. Problem: Falls - Risk of:  Goal: Will remain free from falls  Description  Will remain free from falls  Outcome: Ongoing  Note:   Patient free from falls this shift. Fall precautions in place at all times. Bed in lowest position with two side rails up and wheels locked. Call light within reach. Patient able and agreeable to contact for safety appropriately. Bed alarm in use. Patient up with steady and 1 assist.       Problem: Pain:  Description  Pain management should include both nonpharmacologic and pharmacologic interventions. Goal: Pain level will decrease  Description  Pain level will decrease  Outcome: Ongoing  Note:   No complaints of pain or discomfort verbalized.

## 2019-08-18 PROCEDURE — 6370000000 HC RX 637 (ALT 250 FOR IP): Performed by: PHYSICAL MEDICINE & REHABILITATION

## 2019-08-18 PROCEDURE — 94760 N-INVAS EAR/PLS OXIMETRY 1: CPT

## 2019-08-18 PROCEDURE — 1280000000 HC REHAB R&B

## 2019-08-18 PROCEDURE — 6360000002 HC RX W HCPCS: Performed by: PHYSICAL MEDICINE & REHABILITATION

## 2019-08-18 RX ADMIN — ASPIRIN 81 MG: 81 TABLET ORAL at 11:23

## 2019-08-18 RX ADMIN — SENNOSIDES, DOCUSATE SODIUM 1 TABLET: 50; 8.6 TABLET, FILM COATED ORAL at 11:23

## 2019-08-18 RX ADMIN — POTASSIUM CHLORIDE 10 MEQ: 750 TABLET, EXTENDED RELEASE ORAL at 11:23

## 2019-08-18 RX ADMIN — SENNOSIDES, DOCUSATE SODIUM 2 TABLET: 50; 8.6 TABLET, FILM COATED ORAL at 21:09

## 2019-08-18 RX ADMIN — AMLODIPINE BESYLATE 10 MG: 10 TABLET ORAL at 11:23

## 2019-08-18 RX ADMIN — ENOXAPARIN SODIUM 40 MG: 40 INJECTION SUBCUTANEOUS at 11:30

## 2019-08-18 RX ADMIN — ATORVASTATIN CALCIUM 80 MG: 80 TABLET, FILM COATED ORAL at 21:09

## 2019-08-18 RX ADMIN — PANTOPRAZOLE SODIUM 40 MG: 40 TABLET, DELAYED RELEASE ORAL at 05:52

## 2019-08-18 RX ADMIN — CLOPIDOGREL BISULFATE 75 MG: 75 TABLET ORAL at 11:24

## 2019-08-18 ASSESSMENT — PAIN SCALES - GENERAL
PAINLEVEL_OUTOF10: 0

## 2019-08-18 NOTE — PROGRESS NOTES
Patient is a 49 y/o female admitted to rehab with a right sided stroke. Pt left arm is flaccid. A/A/O x4. Transfers with assist of a walker x1. On a general diet, tolerating well. Medications taken whole with water. On lovenox for DVT prophylaxis. No skin break down noted. Pt skin is clean dry and intact. On room air. Has been continent of urine and stool. LBM 8/17/2019. Chair/bed alarms in use and call light in reach. Will monitor for safety.  Electronically signed by Ally Denny RN on 8/18/2019 at 2:44 PM

## 2019-08-19 PROCEDURE — 97530 THERAPEUTIC ACTIVITIES: CPT

## 2019-08-19 PROCEDURE — 97116 GAIT TRAINING THERAPY: CPT | Performed by: PHYSICAL THERAPIST

## 2019-08-19 PROCEDURE — 6370000000 HC RX 637 (ALT 250 FOR IP): Performed by: PHYSICAL MEDICINE & REHABILITATION

## 2019-08-19 PROCEDURE — 1280000000 HC REHAB R&B

## 2019-08-19 PROCEDURE — 92507 TX SP LANG VOICE COMM INDIV: CPT

## 2019-08-19 PROCEDURE — 97530 THERAPEUTIC ACTIVITIES: CPT | Performed by: PHYSICAL THERAPIST

## 2019-08-19 PROCEDURE — 97112 NEUROMUSCULAR REEDUCATION: CPT

## 2019-08-19 PROCEDURE — 97127 HC SP THER IVNTJ W/FOCUS COG FUNCJ: CPT

## 2019-08-19 PROCEDURE — 97110 THERAPEUTIC EXERCISES: CPT | Performed by: PHYSICAL THERAPIST

## 2019-08-19 PROCEDURE — 92526 ORAL FUNCTION THERAPY: CPT

## 2019-08-19 PROCEDURE — 6360000002 HC RX W HCPCS: Performed by: PHYSICAL MEDICINE & REHABILITATION

## 2019-08-19 RX ORDER — LISINOPRIL 10 MG/1
10 TABLET ORAL NIGHTLY
Status: DISCONTINUED | OUTPATIENT
Start: 2019-08-19 | End: 2019-09-02

## 2019-08-19 RX ORDER — POLYETHYLENE GLYCOL 3350 17 G/17G
17 POWDER, FOR SOLUTION ORAL DAILY PRN
Status: DISCONTINUED | OUTPATIENT
Start: 2019-08-19 | End: 2019-09-04 | Stop reason: HOSPADM

## 2019-08-19 RX ADMIN — LISINOPRIL 10 MG: 10 TABLET ORAL at 21:32

## 2019-08-19 RX ADMIN — PANTOPRAZOLE SODIUM 40 MG: 40 TABLET, DELAYED RELEASE ORAL at 06:04

## 2019-08-19 RX ADMIN — AMLODIPINE BESYLATE 10 MG: 10 TABLET ORAL at 08:37

## 2019-08-19 RX ADMIN — CLOPIDOGREL BISULFATE 75 MG: 75 TABLET ORAL at 08:37

## 2019-08-19 RX ADMIN — POTASSIUM CHLORIDE 10 MEQ: 750 TABLET, EXTENDED RELEASE ORAL at 08:37

## 2019-08-19 RX ADMIN — SENNOSIDES, DOCUSATE SODIUM 2 TABLET: 50; 8.6 TABLET, FILM COATED ORAL at 21:32

## 2019-08-19 RX ADMIN — ATORVASTATIN CALCIUM 80 MG: 80 TABLET, FILM COATED ORAL at 21:32

## 2019-08-19 RX ADMIN — ENOXAPARIN SODIUM 40 MG: 40 INJECTION SUBCUTANEOUS at 08:38

## 2019-08-19 RX ADMIN — ASPIRIN 81 MG: 81 TABLET ORAL at 08:37

## 2019-08-19 RX ADMIN — HYDRALAZINE HYDROCHLORIDE 25 MG: 25 TABLET, FILM COATED ORAL at 17:51

## 2019-08-19 RX ADMIN — SENNOSIDES, DOCUSATE SODIUM 2 TABLET: 50; 8.6 TABLET, FILM COATED ORAL at 08:37

## 2019-08-19 ASSESSMENT — PAIN SCALES - GENERAL
PAINLEVEL_OUTOF10: 0

## 2019-08-19 NOTE — PROGRESS NOTES
Calorie Oral Supplement   Communication: []WFL  [] Aphasia  [x] Dysarthria  [] Apraxia  [] Pragmatic Impairment [] Non-verbal  [] Hearing Loss  [x] Other: Most optimal for concrete  Cognition: [] WFL  [x] Mild  [] Moderate  [] Severe [] Unable to Assess  [] Other:  Memory: [x] WFL  [x] Mild  [] Moderate  [] Severe [] Unable to Assess  [] Other:  Behavior: [x] Alert  [x] Cooperative  [x]  Pleasant  [] Confused  [] Agitated  [] Uncooperative  [] Distractible [] Motivated  [] Self-Limiting [] Anxious  [x] Other: self reports anxious/nervous at times; can be impulsive  Endurance:  [x] Adequate for participation in SLP sessions  [] Reduced overall  [] Lethargic  [] Other:  Safety: [x] No concerns at this time  [] Reduced insight into deficits  []  Reduced safety awareness [] Not following call light procedures  [] Unable to Assess  [] Other:  Swallowing Precautions: Sit up for all meals and thereafter for 30 minutes, Eat with small bites (1/2 tsp; 1 tsp), Dry swallow after each bite/sip, Check mouth for food residue after snacks and meals and Take your medication with apple sauce  Barriers toward progress: caregiver support may be a barrier         Date: 8/19/2019      Tx session 1 Tx session 2   Total Timed Code Min SLP Individual Minutes  Time In: 0730  Time Out: 0815  Minutes: 39  Coded treatment time  10  n/a   Group Treatment Minutes 0 0   Co-Treat Minutes 0 0   Variance/Reason:  n/a n/a   Pain n/a n/a   Pain Intervention [] RN notified  [] Repositioned  [] Intervention offered and patient declined  [] N/A  [] Other: [] RN notified  [] Repositioned  [] Intervention offered and patient declined  [] N/A  [] Other:   Subjective     Pt seen bedside  Pt was in recliner  Pt's maternal parent present for session  Pt with good effort in therapy    Objective:  Goals      Pt goal is to \"eat without fear of choking and without biting left cheek and tongue\"  Therapy working toward pt goal    Dysphagia Goals:   1.  The patient 4. The patient will complete laryngeal/pharyngeal exercises 15/15)  laryngeal ex for airway clearance; glottals; laryngeal elevation: goal appears met n/a   Short-term Goals  Pt goal is \"to get back to work and to get my speech back, it gets so slurred when I am tired\" Therapy working toward pt goal      Goal 1: Pt will demonstrate improved articulatory precision via oral motor exercises over 90%. Emphasized reduced speech rate and exaggeration of o-m movements during oral reading tasks and structured speech tasks. Continued from 8/16/2019:   -use of tactile stimulation when target phonemes emphasized  (ie lip rounding for /w/; /o/; /u/; lip protrusion for \"sh\"; lip retraction for  /s/; at word level) n/a   Goal 2: Pt will demonstrate improved working memory for new learning techniques; new information; and rule application with set up, use of compensatory strategies and intermittent assist.  Working memory for :   -rule application for 85-65 task trials: 0- minimal re-direct (distractible)    to intermittent minimal assist    -recall of dysphagia strategies : set up and intermittent cues    -recall of labial/buccal/lingual ex: set up. Pt able to talk about written home program. Pt able to demonstrate a few of the ex    -recall of oral motor speech strategies: IND n/a   Goal 3: The pt will particiapte in further assessment of higher level cogntiive linguistic skills for advanced daily living executive function  Goal met n/a   Other areas targeted:     Education:   Ongoing pt/family (parent) ed    Safety Devices: [x] Call light within reach  [x] Chair alarm activated  [] Bed alarm activated  [] Other: [x] Call light within reach  [] Chair alarm activated  [] Bed alarm activated  [] Other:    Progress Assessment: 8/15/2019: Pt/family ed in oral motor ex program and swallow strategies/ex. [place] math for counting money intact on testing; minimal to mild complex math word problems intact on testing.    Plan:

## 2019-08-19 NOTE — PLAN OF CARE
Problem: Risk for Impaired Skin Integrity  Goal: Tissue integrity - skin and mucous membranes  Description  Structural intactness and normal physiological function of skin and  mucous membranes. 8/18/2019 2253 by Abdiaziz Mcneal RN  Note:   Skin assessment performed each shift per protocol. Patient turns and repositions self. Patient is continent of bowel and bladder. Bottom remains intact, without redness. Heels elevated with pillows at bedtime. Problem: Falls - Risk of:  Goal: Will remain free from falls  Description  Will remain free from falls  8/18/2019 2253 by Abdiaziz Mcneal RN  Note:   Patient free from falls this shift. Fall precautions in place at all times. Bed in lowest position with two side rails up and wheels locked. Call light within reach. Patient able and agreeable to contact for safety appropriately. Assisted to bathroom with stedy, patient follows safety instructions to avoid falls or injuries. Personal belongings remain within reach. Problem: ACTIVITY INTOLERANCE/IMPAIRED MOBILITY  Goal: Mobility/activity is maintained at optimum level for patient  8/18/2019 2253 by Abdiaziz Mcneal RN  Note:   Patient is showing improvement with transfers from bed to stedy and stedy to bed. Patient was able to sit up to side of bed independently tonight as well as put both legs in bed independently. Left arm remains flaccid. Good strength noted to right upper ext., able to pull self up on stedy from sitting on bed and commode independently. Problem: Pain:  Description  Pain management should include both nonpharmacologic and pharmacologic interventions. Goal: Pain level will decrease  Description  Pain level will decrease  8/18/2019 2253 by Abdiaziz Mcneal RN  Note:   No complaints of pain or discomfort verbalized. Patient encouraged to inform staff of any changes in condition.

## 2019-08-20 PROCEDURE — 97110 THERAPEUTIC EXERCISES: CPT | Performed by: PHYSICAL THERAPIST

## 2019-08-20 PROCEDURE — 92526 ORAL FUNCTION THERAPY: CPT

## 2019-08-20 PROCEDURE — 97112 NEUROMUSCULAR REEDUCATION: CPT

## 2019-08-20 PROCEDURE — 6370000000 HC RX 637 (ALT 250 FOR IP): Performed by: PHYSICAL MEDICINE & REHABILITATION

## 2019-08-20 PROCEDURE — 94760 N-INVAS EAR/PLS OXIMETRY 1: CPT

## 2019-08-20 PROCEDURE — 97127 HC SP THER IVNTJ W/FOCUS COG FUNCJ: CPT

## 2019-08-20 PROCEDURE — 97530 THERAPEUTIC ACTIVITIES: CPT | Performed by: PHYSICAL THERAPIST

## 2019-08-20 PROCEDURE — 1280000000 HC REHAB R&B

## 2019-08-20 PROCEDURE — 97530 THERAPEUTIC ACTIVITIES: CPT

## 2019-08-20 PROCEDURE — 97116 GAIT TRAINING THERAPY: CPT | Performed by: PHYSICAL THERAPIST

## 2019-08-20 PROCEDURE — 97535 SELF CARE MNGMENT TRAINING: CPT

## 2019-08-20 PROCEDURE — 6360000002 HC RX W HCPCS: Performed by: PHYSICAL MEDICINE & REHABILITATION

## 2019-08-20 RX ORDER — BACLOFEN 10 MG/1
5 TABLET ORAL 3 TIMES DAILY PRN
Status: DISCONTINUED | OUTPATIENT
Start: 2019-08-20 | End: 2019-09-04 | Stop reason: HOSPADM

## 2019-08-20 RX ADMIN — SENNOSIDES, DOCUSATE SODIUM 2 TABLET: 50; 8.6 TABLET, FILM COATED ORAL at 22:14

## 2019-08-20 RX ADMIN — ASPIRIN 81 MG: 81 TABLET ORAL at 09:20

## 2019-08-20 RX ADMIN — ENOXAPARIN SODIUM 40 MG: 40 INJECTION SUBCUTANEOUS at 09:22

## 2019-08-20 RX ADMIN — POTASSIUM CHLORIDE 10 MEQ: 750 TABLET, EXTENDED RELEASE ORAL at 09:20

## 2019-08-20 RX ADMIN — PANTOPRAZOLE SODIUM 40 MG: 40 TABLET, DELAYED RELEASE ORAL at 05:38

## 2019-08-20 RX ADMIN — LISINOPRIL 10 MG: 10 TABLET ORAL at 22:15

## 2019-08-20 RX ADMIN — AMLODIPINE BESYLATE 10 MG: 10 TABLET ORAL at 09:20

## 2019-08-20 RX ADMIN — CLOPIDOGREL BISULFATE 75 MG: 75 TABLET ORAL at 09:20

## 2019-08-20 RX ADMIN — ATORVASTATIN CALCIUM 80 MG: 80 TABLET, FILM COATED ORAL at 22:14

## 2019-08-20 RX ADMIN — SENNOSIDES, DOCUSATE SODIUM 2 TABLET: 50; 8.6 TABLET, FILM COATED ORAL at 09:20

## 2019-08-20 RX ADMIN — HYDRALAZINE HYDROCHLORIDE 25 MG: 25 TABLET, FILM COATED ORAL at 15:34

## 2019-08-20 RX ADMIN — BACLOFEN 5 MG: 10 TABLET ORAL at 22:14

## 2019-08-20 ASSESSMENT — PAIN SCALES - GENERAL
PAINLEVEL_OUTOF10: 0
PAINLEVEL_OUTOF10: 0

## 2019-08-20 NOTE — PROGRESS NOTES
Ambulating  Equipment Used: Standard toilet  Toilet Transfers Comments: to/from [facility]  Assessment        SLP  Diet Solids Recommendation: Regular  Liquid Consistency Recommendation: Thin    Body mass index is 34.18 kg/m². Assessment and Plan:    Acute ischemic R MCA stroke (corona radiata) - Secondary ppx with ASA, Plavix, statin. PT/OT/SLP. Spasticity - start baclofen prn    HTN - Uncontrolled. Amlodipine, lisinopril (changed to pm dosing), prn hydralazine. Consider increasing regimen further if remains elevated today. HLD - atorvastatin    Morbid Obesity - Complicating assessment and treatment. Placing patient at risk for multiple co-morbidities as well as early death and contributing to the patient's presentation. Dietician consult. Counseling and education. DVT ppx - Lovenox    Bladder - Monitor PVRs, straight cath prn >300cc    Bowel - Senna+colace, prn miralax, MoM and bosacodyl supp. Rehab progress: Making steady progress with functional mobility, left side function, speech, swallow. Anticipated Dispo: home with mother  Services:  PT, OT, SLP, RN, Aide  DME: TBD, likely feder[employer]-walker  ELOS: 3 weeks (~8/31)      [address]  Margit Schirmer, MD 8/20/2019, 10:26 AM

## 2019-08-20 NOTE — PROGRESS NOTES
maximize rom    Left labial/buccal strengthening  -strengthening ex via resistance emphasizing labial/buccal muscular engagement for labial/buccal seal: x 10 for 5 sec duration  -simulation bolus manipulation laterally: x 10-11 reps without left anterior loss    PO presentations:   -labial seal for cup and straw for liquids without anterior loss  -during mastication: achieving labial closure; achieving left labial/buccal muscular engagement during mastication   n/a       4. The patient will complete laryngeal/pharyngeal exercises 15/15)  goal appears met  D/c n/a   Short-term Goals  Pt goal is \"to get back to work and to get my speech back, it gets so slurred when I am tired\" Therapy working toward pt goal      Goal 1: Pt will demonstrate improved articulatory precision via oral motor exercises over 90%. Emphasized reduced speech rate and exaggeration of o-m movements during oral reading tasks and structured speech tasks.       -use of tactile stimulation when target phonemes emphasized  (ie lip rounding for /w/; /o/; /u/; lip protrusion for \"sh\" \"ee\" ; lip retraction for  /s/ /z/; at word level) n/a   Goal 2: Pt will demonstrate improved working memory for new learning techniques; new information; and rule application with set up, use of compensatory strategies and intermittent assist.  Working memory for :   -rule application for 73-88 task trials: 0- minimal re-direct (distractible)    to intermittent minimal assist    -recall of dysphagia strategies : set up and intermittent cues    -recall of labial/buccal/lingual ex: set up.     -recall of oral motor speech strategies: IND n/a   Goal 3: The pt will particiapte in further assessment of higher level cogntiive linguistic skills for advanced daily living executive function  Goal met n/a   Other areas targeted:     Education:   Ongoing pt ed    Safety Devices: [x] Call light within reach  [] Chair alarm activated  [] Bed alarm activated  [x] Other: visitors at her side ; awaiting OT ADL at 0915 [] Call light within reach  [] Chair alarm activated  [] Bed alarm activated  [] Other:    Progress Assessment: 8/15/2019: Pt/family ed in oral motor ex program and swallow strategies/ex. [place] math for counting money intact on testing; minimal to mild complex math word problems intact on testing. Plan: Continue as per plan of care. 8/16/2019: Initiated NMES vital stimulation (Oropahrygnela Sling). Well tolerated-will continue. Continue all goals for next week   Continued Tx Upon Discharge: ?    [] Yes [] No [] TBD based on progress while on ARU [] Vital Stim indicated [] Other:   Estimated discharge date:    Discharge recommendations:   [] Home independently  [] Home with assistance []  24 hour supervision  [] ECF [] Other:     Additional information:   Interventions used during Rehab Stay:  [x] Speech/Language Treatment  [] Instruction in HEP [] Group [x] Dysphagia Treatment [x] Cognitive Treatment   [] Other:    Adverse Reactions to Treatment/Significant Change in Status: n/a    Electronically Signed by      Silverio Gilbert,MS,CCC,SLP [id number]  Speech and Language Pathologist

## 2019-08-20 NOTE — PROGRESS NOTES
%  · Anthropometric Measures:  · Ht: 5' 7\" (170.2 cm)   · Current Body Wt: 218 lb (98.9 kg)  · Admission Body Wt: 224 lb (101.6 kg)  · Usual Body Wt: 217 lb (98.4 kg)  · Ideal Body Wt: 135 lb (61.2 kg),   · BMI Classification: BMI 30.0 - 34.9 Obese Class I    Nutrition Interventions:   Continue current diet, Continue current ONS  Continued Inpatient Monitoring    Nutrition Evaluation:   · Evaluation: Progressing toward goals   · Goals: Pt will consume >/=50% of meals this admission.      · Monitoring: Meal Intake, Supplement Intake, Weight, Mental Status/Confusion, Chewing/Swallowing, Monitor Bowel Function      Electronically signed by Angely Gomez RD, LD on 8/20/19 at 2:09 PM    Contact Number: [phone]

## 2019-08-20 NOTE — PROGRESS NOTES
Patient Co of cramp to right calf. Assisted with ROM. States feeling better after standing at the bedside in stedy. Refused ice or pain medication. Will monitor.

## 2019-08-21 PROCEDURE — 97116 GAIT TRAINING THERAPY: CPT

## 2019-08-21 PROCEDURE — 6370000000 HC RX 637 (ALT 250 FOR IP): Performed by: PHYSICAL MEDICINE & REHABILITATION

## 2019-08-21 PROCEDURE — 94760 N-INVAS EAR/PLS OXIMETRY 1: CPT

## 2019-08-21 PROCEDURE — 6360000002 HC RX W HCPCS: Performed by: PHYSICAL MEDICINE & REHABILITATION

## 2019-08-21 PROCEDURE — 97530 THERAPEUTIC ACTIVITIES: CPT

## 2019-08-21 PROCEDURE — 97112 NEUROMUSCULAR REEDUCATION: CPT

## 2019-08-21 PROCEDURE — 92507 TX SP LANG VOICE COMM INDIV: CPT

## 2019-08-21 PROCEDURE — 92526 ORAL FUNCTION THERAPY: CPT

## 2019-08-21 PROCEDURE — 1280000000 HC REHAB R&B

## 2019-08-21 PROCEDURE — 97127 HC SP THER IVNTJ W/FOCUS COG FUNCJ: CPT

## 2019-08-21 RX ADMIN — ATORVASTATIN CALCIUM 80 MG: 80 TABLET, FILM COATED ORAL at 21:16

## 2019-08-21 RX ADMIN — BACLOFEN 5 MG: 10 TABLET ORAL at 21:16

## 2019-08-21 RX ADMIN — HYDRALAZINE HYDROCHLORIDE 25 MG: 25 TABLET, FILM COATED ORAL at 05:37

## 2019-08-21 RX ADMIN — SENNOSIDES, DOCUSATE SODIUM 2 TABLET: 50; 8.6 TABLET, FILM COATED ORAL at 08:37

## 2019-08-21 RX ADMIN — CLOPIDOGREL BISULFATE 75 MG: 75 TABLET ORAL at 08:37

## 2019-08-21 RX ADMIN — ENOXAPARIN SODIUM 40 MG: 40 INJECTION SUBCUTANEOUS at 08:38

## 2019-08-21 RX ADMIN — PANTOPRAZOLE SODIUM 40 MG: 40 TABLET, DELAYED RELEASE ORAL at 05:30

## 2019-08-21 RX ADMIN — ASPIRIN 81 MG: 81 TABLET ORAL at 08:37

## 2019-08-21 RX ADMIN — POTASSIUM CHLORIDE 10 MEQ: 750 TABLET, EXTENDED RELEASE ORAL at 08:37

## 2019-08-21 RX ADMIN — AMLODIPINE BESYLATE 10 MG: 10 TABLET ORAL at 08:37

## 2019-08-21 RX ADMIN — LISINOPRIL 10 MG: 10 TABLET ORAL at 21:16

## 2019-08-21 ASSESSMENT — PAIN SCALES - GENERAL
PAINLEVEL_OUTOF10: 0

## 2019-08-21 NOTE — PLAN OF CARE
Problem: Risk for Impaired Skin Integrity  Goal: Tissue integrity - skin and mucous membranes  Description  Structural intactness and normal physiological function of skin and  mucous membranes. 8/21/2019 0129 by Isa Amin RN  Outcome: Ongoing  Note:   Patient is at risk for impaired skin integrity. Offered to turn and reposition patient every two hours and as needed. Skin is assessed every shift and prn. Patient has pillow support, to help reduce skin breakdown. Barrier cream applied for incontinence as needed. Will continue to monitor. Problem: Falls - Risk of:  Goal: Will remain free from falls  Description  Will remain free from falls  8/21/2019 0129 by Isa Amin RN  Outcome: Ongoing  Note:   Patient is a high fall risk. Patient free of falls this shift. Bed low, locked and alarmed at all times. Call light and bedside table is within reach. Arm band on wrist, fall light on outside of room, and nonskid socks are on patient. Notified patient to ask for assistance when needed. Patient verbalized understanding. Problem: Pain:  Goal: Pain level will decrease  Description  Pain level will decrease  8/21/2019 0129 by Isa Amin RN  Outcome: Ongoing  Note:   Patient denies any pain at this time. Will continue to monitor.

## 2019-08-21 NOTE — PROGRESS NOTES
left face and arm weakness in setting of hypertension overnight. CTA with bilateral soft plaque at bifurcation. Exam with left face and arm weakness much more than leg and right gaze preference suggesting of cortical right MCA stroke. Patient found to have thrombotic right MCA stroke on MRI in deep right corona radiata area. Patient started on DAPT with aspirin and Plavix, blood pressure medication, and patient was started in therapies. Patient is well below her baseline function of being Ind without an AD and working full time; pt currently is needing assist of 1-2 for mobility tasks and needing pedrito stedy lift for transfers with nursing and A of 2 for short distance gait with hemiwalker. Patient needs more therapy before discharge to home, where she lives with her son in a 2nd floor apartment. Patient was approved by her insurance for rehab unit admission today. She is agreeable to Rehab Unit treatment\"  Subjective  Subjective: Pt agreeable to therapy session. Denies pain. Orientation  Orientation  Overall Orientation Status: Within Normal Limits    Objective      Transfers  Sit to Stand: Contact guard assistance  Stand to sit: Contact guard assistance     Ambulation  Ambulation?: Yes  Ambulation 1  Surface: level tile  Device: Small Syed Iman  Assistance: Contact guard assistance  Quality of Gait: Pt ambulated with slow speed, erect posture, no knee buckling or hyperextension LLE, no LOB. Distance: 200'     Exercises  Comments: Without UE support: RLE/LLE forward/backward stepping x 10, CGA-Min A, cues to avoid lateral LOB, cues to clear LLE from floor when backward stepping. Other exercises  Other exercises 2: Side-stepping R/L 5 steps x 3 trials, Min-CGa to correct for several mild LOB, cues for technique. Forward/backward walking without UE support 5' x 3 trials, cues to increase LLE clearance during backward stepping, Min A to correct 2 minor LOB.      Goals  Short term

## 2019-08-21 NOTE — PROGRESS NOTES
PHYSICAL THERAPY  Progress Note   Second Session    Patient Name: Oscar Bo  Medical Record Number: [id number]    Treatment Diagnosis: Decreased functional mobility; Difficulty walking; Decreased strength; Decreased ROM      Chart Reviewed: Yes   Restrictions/Precautions: Fall Risk Other position/activity restrictions: Impulsive, mild left neglect. Cardiac diet. Additional Pertinent Hx: Per Dr. Salli Schaumann, 8/14, \"51 y.o. female who presented with facial weakness and progressed to having left face and arm weakness in setting of hypertension overnight. CTA with bilateral soft plaque at bifurcation. Exam with left face and arm weakness much more than leg and right gaze preference suggesting of cortical right MCA stroke. Patient found to have thrombotic right MCA stroke on MRI in deep right corona radiata area. Patient started on DAPT with aspirin and Plavix, blood pressure medication, and patient was started in therapies. Patient is well below her baseline function of being Ind without an AD and working full time; pt currently is needing assist of 1-2 for mobility tasks and needing pedrito stedy lift for transfers with nursing and A of 2 for short distance gait with hemiwalker. Patient needs more therapy before discharge to home, where she lives with her son in a 2nd floor apartment. Patient was approved by her insurance for rehab unit admission today. She is agreeable to Rehab Unit treatment\"        Subjective: Pt agreeable to therapy session. Denies pain. Objective    Pt practiced stair climb. She was able to ascend/descend 12 steps with RUE support, non-reciprocal pattern, initial cues for LE sequencing, no LOB. She then ascended/descended curb step with SBQC support, CGA, cues for sequencing, no LOB. Pt did well to complete transfers throughout session with CGA-SBA. Pt completed seated exercises:  LLE LAQ 2 x 10, HS curl 2 x 10, L ankle PF 2 x 20, and ankle DF 2 x 10, all with green t-band resistance.

## 2019-08-21 NOTE — PROGRESS NOTES
diet.  Subjective   General  Chart Reviewed: Yes, Progress Notes  Patient assessed for rehabilitation services?: Yes  Additional Pertinent Hx: Pt is a 50 y.o. female who presented to hospital d/t facial drooping and UE parathesia. PMH includes: HTN. Response to previous treatment: Patient with no complaints from previous session  Family / Caregiver Present: Yes(mother)  Referring Practitioner: MD Claire  Diagnosis: CVA  Subjective  Subjective: pt met in dept, agreeable to OT, states her left shoulder is hurting after position         Objective             Balance  Sitting Balance: Independent  Standing Balance: Stand by assistance  Standing Balance  Time: 5 min  Activity: standing at table for UE ex                       Neuromuscular Education  Neuromuscular education: Yes  NDT Treatment: Sitting;Standing  Joint Compression: through each joint  Vibration: to tricep, bicep digit flex/ext. min initiation of elbow flex/ext  Taping: added strip ant humerus to scapula for increased support, decrease pain  Functional Movement Patterns: standing at table able to complete 50% horizontal abd/add while supported on yellow physioball. Seated in wheelchair, able to hold yellow physioball with BUE for integration right/left - isometric contraction sufficient to hold left hand on ball, minimally initiating elbow flex with left UE      Pt complains of pain ant shoulder in pectoral area. Completed massage and stretching, deep pressure to muscle for relaxation     Perception  Overall Perceptual Status: Impaired  Unilateral Attention: Cues to attend to left side of body;Cues to attend left visual field           Upper Extremity Function  NDT Treatment: Sitting;Standing         PM session  Pt met indept, mother present.   Pt stated she was fatigued, but agreeable to OT  Amb 12 ft wheelchair to mat with CGA, sling on LUE  (Pt able to don sling with mod assist and cues)  Seated on mat with feet on stool to maintain hips at 90 deg  Weight bearing ex to LUE from side of body to behind hip with support to elbow. Yellow physioball - left forearm supported , pt with active horizontal abd/add 75% AROM      In supine, left elbow supported to maintain shoulder in alignment with body - not in extension to protect shoulder. In this position she was able to actively initiate elbow flex with tapping to bicep and hold in various positions of flexion. One attempt able to initiate from full extension, but then only from approx  145 to 90 deg flex twice more. Fatigues quickly. Place and hold activities from 145 - 90 deg      no active movement distal to elbow at this time    Discussed positioning at night for LUE with pillow with patient and mother    Transferred back to wheelchair, amb 5 ft due to fatigue with CGA    Returned to room per transport at end of session  Plan   Plan  Times per week: 5 - 7  Times per day: Twice a day  Plan weeks: 3 - 4 weeks  Current Treatment Recommendations: Strengthening, ROM, Balance Training, Functional Mobility Training, Endurance Training, Neuromuscular Re-education, Equipment Evaluation, Education, & procurement, Self-Care / ADL, Patient/Caregiver Education & Training, Safety Education & Training, Positioning  Plan Comment: MON conf               Goals  Short term goals  Time Frame for Short term goals:  In 1 - 2 weeks,   Short term goal 1: Pt will complete feeding with setup of containers    Short term goal 2: Pt will complete grooming seated with min A   Short term goal 3: Pt will complete bathing with min A seated on BSC  Goal Met  Short term goal 4: Pt will complete UE dressing with min A and use of federico techniques   Short term goal 5: Pt will complete LE dressing with min A and use of A/E  Short term goal 6: Pt will complete toileting with min A   Short term goal 7: Pt will complete household t/f with min A (anticipate start with stand pivot t/f using least restrictive AD)  Long term goals  Time Frame for

## 2019-08-21 NOTE — PROGRESS NOTES
Patient was admitted on 8/13 for right sided cerebral hemisphere CVA. Patient is A&O x4. Patient does not complain of pain. Patient transfers with stedy x 1. All PM medications were given without issue. Patient is continent of bowels and bladder. Last BM 8/20. Patient has left sided weakness, needs left arm supported. Patient is sleeping soundly most of shift. Call light is within reach. Patient calls as needed.

## 2019-08-21 NOTE — PROGRESS NOTES
Recommendations  Other: will continue to assess for least restrictive assistive device (LRAD)  Toilet - Technique: Ambulating  Equipment Used: Standard toilet  Toilet Transfers Comments: to/from Rosa Fisher  Assessment        SLP  Diet Solids Recommendation: Regular  Liquid Consistency Recommendation: Thin    Body mass index is 34.25 kg/m². Assessment and Plan:    Acute ischemic R MCA stroke (corona radiata) - Secondary ppx with ASA, Plavix, statin. PT/OT/SLP. Spasticity - improved with baclofen prn    HTN - Uncontrolled. Amlodipine, lisinopril (changed to pm dosing), prn hydralazine. HLD - atorvastatin    Morbid Obesity - Complicating assessment and treatment. Placing patient at risk for multiple co-morbidities as well as early death and contributing to the patient's presentation. Dietician consult. Counseling and education. DVT ppx - Lovenox    Bladder - Monitor PVRs, straight cath prn >300cc    Bowel - Senna+colace, prn miralax, MoM and bosacodyl supp. Rehab progress: Making steady progress with functional mobility, left side function, speech, swallow. Anticipated Dispo: home with mother  Services:  PT, OT, SLP, RN, Aide  DME: TBD, likely federico-walker  ELOS: 3 weeks (~8/31)      [address].  Mauro Pyle MD 8/21/2019, 5:27 PM

## 2019-08-22 LAB
ANION GAP SERPL CALCULATED.3IONS-SCNC: 13 MMOL/L (ref 3–16)
BASOPHILS ABSOLUTE: 0 K/UL (ref 0–0.2)
BASOPHILS RELATIVE PERCENT: 0.7 %
BUN BLDV-MCNC: 15 MG/DL (ref 7–20)
CALCIUM SERPL-MCNC: 9 MG/DL (ref 8.3–10.6)
CHLORIDE BLD-SCNC: 104 MMOL/L (ref 99–110)
CO2: 25 MMOL/L (ref 21–32)
CREAT SERPL-MCNC: 0.7 MG/DL (ref 0.6–1.1)
EOSINOPHILS ABSOLUTE: 0.2 K/UL (ref 0–0.6)
EOSINOPHILS RELATIVE PERCENT: 2.9 %
GFR AFRICAN AMERICAN: >60
GFR NON-AFRICAN AMERICAN: >60
GLUCOSE BLD-MCNC: 99 MG/DL (ref 70–99)
HCT VFR BLD CALC: 34.8 % (ref 36–48)
HEMOGLOBIN: 11.2 G/DL (ref 12–16)
LYMPHOCYTES ABSOLUTE: 1.2 K/UL (ref 1–5.1)
LYMPHOCYTES RELATIVE PERCENT: 21.8 %
MCH RBC QN AUTO: 25.8 PG (ref 26–34)
MCHC RBC AUTO-ENTMCNC: 32.3 G/DL (ref 31–36)
MCV RBC AUTO: 79.7 FL (ref 80–100)
MONOCYTES ABSOLUTE: 0.3 K/UL (ref 0–1.3)
MONOCYTES RELATIVE PERCENT: 5.5 %
NEUTROPHILS ABSOLUTE: 3.7 K/UL (ref 1.7–7.7)
NEUTROPHILS RELATIVE PERCENT: 69.1 %
PDW BLD-RTO: 17.4 % (ref 12.4–15.4)
PLATELET # BLD: 224 K/UL (ref 135–450)
PMV BLD AUTO: 9.2 FL (ref 5–10.5)
POTASSIUM REFLEX MAGNESIUM: 4.2 MMOL/L (ref 3.5–5.1)
RBC # BLD: 4.36 M/UL (ref 4–5.2)
SODIUM BLD-SCNC: 142 MMOL/L (ref 136–145)
WBC # BLD: 5.3 K/UL (ref 4–11)

## 2019-08-22 PROCEDURE — 92526 ORAL FUNCTION THERAPY: CPT

## 2019-08-22 PROCEDURE — 1280000000 HC REHAB R&B

## 2019-08-22 PROCEDURE — 97535 SELF CARE MNGMENT TRAINING: CPT

## 2019-08-22 PROCEDURE — 97127 HC SP THER IVNTJ W/FOCUS COG FUNCJ: CPT

## 2019-08-22 PROCEDURE — 6360000002 HC RX W HCPCS: Performed by: PHYSICAL MEDICINE & REHABILITATION

## 2019-08-22 PROCEDURE — 85025 COMPLETE CBC W/AUTO DIFF WBC: CPT

## 2019-08-22 PROCEDURE — 6370000000 HC RX 637 (ALT 250 FOR IP): Performed by: PHYSICAL MEDICINE & REHABILITATION

## 2019-08-22 PROCEDURE — 80048 BASIC METABOLIC PNL TOTAL CA: CPT

## 2019-08-22 PROCEDURE — 36415 COLL VENOUS BLD VENIPUNCTURE: CPT

## 2019-08-22 PROCEDURE — 97112 NEUROMUSCULAR REEDUCATION: CPT

## 2019-08-22 PROCEDURE — 94760 N-INVAS EAR/PLS OXIMETRY 1: CPT

## 2019-08-22 PROCEDURE — 92507 TX SP LANG VOICE COMM INDIV: CPT

## 2019-08-22 PROCEDURE — 97110 THERAPEUTIC EXERCISES: CPT | Performed by: PHYSICAL THERAPIST

## 2019-08-22 PROCEDURE — 97530 THERAPEUTIC ACTIVITIES: CPT | Performed by: PHYSICAL THERAPIST

## 2019-08-22 PROCEDURE — 97116 GAIT TRAINING THERAPY: CPT | Performed by: PHYSICAL THERAPIST

## 2019-08-22 RX ORDER — SENNA AND DOCUSATE SODIUM 50; 8.6 MG/1; MG/1
1 TABLET, FILM COATED ORAL NIGHTLY
Status: DISCONTINUED | OUTPATIENT
Start: 2019-08-23 | End: 2019-09-04 | Stop reason: HOSPADM

## 2019-08-22 RX ORDER — HYDROCORTISONE 0.5 %
CREAM (GRAM) TOPICAL 4 TIMES DAILY PRN
Status: DISCONTINUED | OUTPATIENT
Start: 2019-08-22 | End: 2019-09-04 | Stop reason: HOSPADM

## 2019-08-22 RX ADMIN — PANTOPRAZOLE SODIUM 40 MG: 40 TABLET, DELAYED RELEASE ORAL at 05:55

## 2019-08-22 RX ADMIN — ATORVASTATIN CALCIUM 80 MG: 80 TABLET, FILM COATED ORAL at 20:41

## 2019-08-22 RX ADMIN — AMLODIPINE BESYLATE 10 MG: 10 TABLET ORAL at 10:21

## 2019-08-22 RX ADMIN — MENTHOL, METHYL SALICYLATE: 10; 15 CREAM TOPICAL at 20:10

## 2019-08-22 RX ADMIN — LISINOPRIL 10 MG: 10 TABLET ORAL at 20:41

## 2019-08-22 RX ADMIN — CLOPIDOGREL BISULFATE 75 MG: 75 TABLET ORAL at 10:21

## 2019-08-22 RX ADMIN — ENOXAPARIN SODIUM 40 MG: 40 INJECTION SUBCUTANEOUS at 10:22

## 2019-08-22 RX ADMIN — ASPIRIN 81 MG: 81 TABLET ORAL at 10:21

## 2019-08-22 RX ADMIN — POTASSIUM CHLORIDE 10 MEQ: 750 TABLET, EXTENDED RELEASE ORAL at 10:21

## 2019-08-22 ASSESSMENT — PAIN SCALES - GENERAL
PAINLEVEL_OUTOF10: 0

## 2019-08-22 ASSESSMENT — PAIN DESCRIPTION - ORIENTATION: ORIENTATION: LEFT

## 2019-08-22 ASSESSMENT — PAIN DESCRIPTION - PAIN TYPE: TYPE: ACUTE PAIN

## 2019-08-22 ASSESSMENT — PAIN DESCRIPTION - LOCATION: LOCATION: SHOULDER

## 2019-08-22 ASSESSMENT — PAIN DESCRIPTION - DESCRIPTORS: DESCRIPTORS: ACHING

## 2019-08-22 NOTE — PROGRESS NOTES
Speech Language Pathology  ACUTE REHAB UNIT  SPEECH/LANGUAGE PATHOLOGY      [x] Daily  [] Weekly Care Conference Note  [] Discharge    Patient: Magda Casas      :1970  RPX:[id number]  Rehab Dx/Hx: Right sided cerebral hemisphere cerebrovascular accident (CVA) ([facility] [address] [I63.9]    Precautions: FAlls; Dysphagia complaints  Home situation: Lives with grown son   Dx: [] Aphasia  [] Dysarthria  [] Apraxia   [x] Oropharyngeal dysphagia [x] Cognitive   Impairment  [] Other:   Initial Speech Therapy Assessment Diagnosis:   1. Cognitive Diagnosis: [place] cogntion intact except for mild deficits in working memory and complex processing. Further assessment in complex cognitive linuistic skills for executive function. 2. Speech Diagnosis: Pt with mild dysarthria characterized by left sided weakess resulting in mild phonemic distortions and syllable reductions. Pt has a rapid rate of speech. Pt's speech is adible and >90% intellgibile. 3. Communication Diagnosis: Pt with deficits in complex processing. This may be pre-morbid. Ongoing assessment warranted. 4. Dysphagia Diagnosis: Mild to moderate Oropharyngeal Dysphagia characterized by prolonged mastication using only the right side, reduced bolus control for A-P oral transit, potential delayed trigger of the swallow, potential reduced laryngeal elevation. Symptoms: Complaints of biting left cheek and left side of tongue; Pocketing of food in left buccal cavity; Minimal oral residue cleared with liquid wash; No clinical s/s of penetration aspiration; Pt with one complaint that something was \"stuck\", but was able to swallow it successfully with a liquid wash. Pt states she is unsure if swallowing difficulty is \"just because I am scared to swallow\". Date of Admit: 2019  Room #: V9C-4384/3270-01  Date: 2019       Current functional status (updated daily):         Current Diet Order:DIET CARDIAC;   Dietary Nutrition Supplements: Standard High Calorie Oral Supplement   Communication: []WFL  [] Aphasia  [x] Dysarthria  [] Apraxia  [] Pragmatic Impairment [] Non-verbal  [] Hearing Loss  [x] Other: Most optimal for concrete  Cognition: [] WFL  [x] Mild  [] Moderate  [] Severe [] Unable to Assess  [] Other:  Memory: [x] WFL  [x] Mild  [] Moderate  [] Severe [] Unable to Assess  [] Other:  Behavior: [x] Alert  [x] Cooperative  [x]  Pleasant  [] Confused  [] Agitated  [] Uncooperative  [] Distractible [] Motivated  [] Self-Limiting [] Anxious  [x] Other: self reports anxious/nervous at times; can be impulsive  Endurance:  [x] Adequate for participation in SLP sessions  [] Reduced overall  [] Lethargic  [] Other:  Safety: [x] No concerns at this time  [] Reduced insight into deficits  []  Reduced safety awareness [] Not following call light procedures  [] Unable to Assess  [] Other:  Swallowing Precautions: Sit up for all meals and thereafter for 30 minutes, Eat with small bites (1/2 tsp; 1 tsp), Dry swallow after each bite/sip, Check mouth for food residue after snacks and meals and Take your medication with apple sauce  Barriers toward progress: caregiver support may be a barrier         Date: 8/22/2019      Tx session 1 Tx session 2   Total Timed Code Min SLP Individual Minutes  Time In: 3717  Time Out: 1430  Minutes: 39  Coded treatment time  15  n/a   Group Treatment Minutes 0 0   Co-Treat Minutes 0 0   Variance/Reason:  n/a n/a   Pain n/a n/a   Pain Intervention [] RN notified  [] Repositioned  [] Intervention offered and patient declined  [] N/A  [] Other: [] RN notified  [] Repositioned  [] Intervention offered and patient declined  [] N/A  [] Other:   Subjective     Pt seen in treatment office  Good effort  Good effort in therapy    Objective:  Goals      Pt goal is to \"eat without fear of choking and without biting left cheek and tongue\"  Therapy working toward pt goal    Dysphagia Goals:   1.  The patient will tolerate regular consistency diet without observed clinical signs of aspiration,    Direct dysphagia f/u: Thin liquids by cup: no  overt clinical s/s of penetration/aspiration ; no left anterior loss during/post swallow    Regular solid \"stick\" (PB/J on bread sandwich): pt achieving left o-m muscular engagement during mastication; no anterior loss left; mild oral pocketing-pt does have sensation of and clears. No overt clinical s/s of penetration or aspiration    n/a   2. The patient/caregiver will demonstrate understanding of compensatory strategies for improved swallowing safety. ,  Reinforced:   ·  sitting posture for meals:  · Labial seal during mastication of foods and during oral phase with liquids  · Oral suckling and lingual sweep for clearance of oral residue  · Encouraging mastication left side as well as right side  · Reinforcement of GERD precautions n/a   3.  The patient will improve oral motor function via therapeutic oral motor exercises to 5/5 each trial., Tactile Ector Lease stimulation via use of tactile message; icing and NMES vital stimulation (oropharyngeal sling placement at 7.5 for 29 minutes)    Left o-m rom  -tactile stimulation with icing well tolerated emphasizing left labial buccal: well tolerated with improving rom after stimulation  -volitional lip rounding: continued without and without tactile stretch stimulation  -volitional lower lip retraction:  continued without and without tactile stretch stimulation  -volitional left labial/buccal retraction:continued without and without tactile stretch stimulation   -volitional lip protrusion:  continued without and without tactile stretch stimulation  -trapping air in mouth:  Continued improvement as ex progressed  - emphasizing labial rom upper/lower keeping dentition occluded to maximize rom: gradual improvement as ex progressed    Left labial/buccal strengthening  -strengthening ex via resistance emphasizing labial/buccal muscular engagement for labial/buccal seal: x 8 for 5 sec duration for warmup  -simulation bolus manipulation laterally: x 5-20 ! reps without left anterior loss. Improves as ex progresses    PO presentations:   -labial seal for cup of liquids presentations without anterior loss  -during mastication: achieving labial closure; achieving left labial/buccal muscular engagement during mastication. No trace residue/leakage at left corner of lips   n/a       4. The patient will complete laryngeal/pharyngeal exercises 15/15)  goal appears met  D/c n/a   Short-term Goals  Pt goal is \"to get back to work and to get my speech back, it gets so slurred when I am tired\" Therapy working toward pt goal      Goal 1: Pt will demonstrate improved articulatory precision via oral motor exercises over 90%.   Pt with audible and intelligible speech during discourse: continue to note gradual increase in left o-m muscular engagement during less structured speech tasks      Structured speech  -use of tactile stimulation when target phonemes emphasized  (ie lip rounding for /w/; lip protrusion for \"sh\" ; lip retraction for  \"ee\"; at word level): well tolerated n/a   Goal 2: Pt will demonstrate improved working memory for new learning techniques; new information; and rule application with set up, use of compensatory strategies and intermittent assist.  Working memory for :   -rule application for - task trials: 0 re-direct     -recall of dysphagia strategies : set up and intermittent cues    -recall of labial/buccal/lingual ex: set up.     -recall of oral motor speech strategies: IND    -recall of learned techniques to compensate for left weakness: reinforced    REcall of new information from paragraph:   -without re-readin%  -with re-readin% n/a   Goal 3: The pt will particiapte in further assessment of higher level cogntiive linguistic skills for advanced daily living executive function  PS/reasoning  -using paragraph information to make judgement regarding statement as to T/F or not enough information to make a judgement: mild assist n/a   Other areas targeted:     Education:   Ongoing pt ed    Safety Devices: [] Call light within reach  [] Chair alarm activated  [] Bed alarm activated  [x] Other: transport returned pt to room [] Call light within reach  [] Chair alarm activated  [] Bed alarm activated  [] Other:    Progress Assessment: 8/15/2019: Pt/family ed in oral motor ex program and swallow strategies/ex. [place] math for counting money intact on testing; minimal to mild complex math word problems intact on testing. Plan: Continue as per plan of care. 8/16/2019: Initiated NMES vital stimulation (Oropahrygnela Sling). Well tolerated-will continue. Continue all goals for next week   Continued Tx Upon Discharge: ?    [] Yes [] No [] TBD based on progress while on ARU [] Vital Stim indicated [] Other:   Estimated discharge date:    Discharge recommendations:   [] Home independently  [] Home with assistance []  24 hour supervision  [] ECF [] Other:     Additional information:   Interventions used during Rehab Stay:  [x] Speech/Language Treatment  [] Instruction in HEP [] Group [x] Dysphagia Treatment [x] Cognitive Treatment   [] Other:    Adverse Reactions to Treatment/Significant Change in Status: n/a    Electronically Signed by      [facility]. [place],MS,CCC,SLP [id number]  Speech and Language Pathologist

## 2019-08-22 NOTE — PROGRESS NOTES
K/uL    Eosinophils # 0.2 0.0 - 0.6 K/uL    Basophils # 0.0 0.0 - 0.2 K/uL   Basic Metabolic Panel w/ Reflex to MG    Collection Time: 08/22/19  8:58 AM   Result Value Ref Range    Sodium 142 136 - 145 mmol/L    Potassium reflex Magnesium 4.2 3.5 - 5.1 mmol/L    Chloride 104 99 - 110 mmol/L    CO2 25 21 - 32 mmol/L    Anion Gap 13 3 - 16    Glucose 99 70 - 99 mg/dL    BUN 15 7 - 20 mg/dL    CREATININE 0.7 0.6 - 1.1 mg/dL    GFR Non-African American >60 >60    GFR African American >60 >60    Calcium 9.0 8.3 - 10.6 mg/dL       Therapy progress:  PT  Position Activity Restriction  Other position/activity restrictions: Impulsive, mild left neglect. Cardiac diet. Objective     Sit to Stand: Contact guard assistance  Stand to sit: Contact guard assistance  Bed to Chair: Contact guard assistance(turning and backing up to chair with quad cane)  Device: Small Syed Iman  Other Apparatus: (F-Origining L UE)  Assistance: Contact guard assistance  Distance: 200'  OT  PT Equipment Recommendations  Other: will continue to assess for least restrictive assistive device (LRAD)  Toilet - Technique: Ambulating  Equipment Used: Standard toilet  Toilet Transfers Comments: to/from Tere Isabelle  Assessment        SLP  Diet Solids Recommendation: Regular  Liquid Consistency Recommendation: Thin    Body mass index is 34.67 kg/m². Assessment and Plan:    Acute ischemic R MCA stroke (corona radiata) - Secondary ppx with ASA, Plavix, statin. PT/OT/SLP. Spasticity - improved with baclofen prn    HTN - Uncontrolled, now improving on current regimen. Amlodipine, lisinopril (changed to pm dosing), prn hydralazine. HLD - atorvastatin    Morbid Obesity - Complicating assessment and treatment. Placing patient at risk for multiple co-morbidities as well as early death and contributing to the patient's presentation. Dietician consult. Counseling and education.     DVT ppx - Lovenox    Bladder - Monitor PVRs, straight cath prn

## 2019-08-22 NOTE — PROGRESS NOTES
Yes(daughter)  Referring Practitioner: MD Claire  Diagnosis: CVA  Subjective  Subjective: pt met bedside, finishing meds with nursing             Objective    ADL  UE Bathing: Verbal cueing;Setup(cues for federico technique)  LE Bathing: Contact guard assistance(long sponge for LE, CGA to stand, initially uses grab bar for balance, then able to leg go and bathe own buttocks)  UE Dressing: Setup;Verbal cueing(used federico technique per self)  LE Dressing: Moderate assistance(used reacher to assist with briefs, assisted with left side of pants, over hips per self with CGA)  Toileting: Contact guard assistance        Balance  Sitting Balance: Independent  Standing Balance: Stand by assistance  Functional Mobility  Functional - Mobility Device: Cane  Activity: To/from bathroom  Assist Level: Contact guard assistance  Functional Mobility Comments: Daughter assisting with mobility to bathroom  Toilet Transfers  Toilet - Technique: Ambulating  Equipment Used: Standard toilet  Toilet Transfer: Contact guard assistance  Shower Transfers  Shower - Transfer From: Ferd Plaster - Transfer Type: To and From  Shower - Transfer To:  Shower seat with back  Shower Transfers: Contact Guard  Wheelchair Bed Transfers  Wheelchair/Bed - Technique: Ambulating  Equipment Used: Bed  Level of Asssistance: Contact guard assistance                             Cognition  Problem Solving: Assistance required to implement solutions;Assistance required to generate solutions  Sequencing: Requires cues for some     Perception  Overall Perceptual Status: Impaired  Unilateral Attention: Cues to attend to left side of body;Cues to attend left visual field         PM session  Pt to dept with daughter observing  Transfer wheelchair to mat stand pivot without device with CGA to right and left, making sure when moving to left that she keeps right hand on mat    Sitting on edge of mat completed wt bearing ex to LUE with extended elbow - support to elbow, but goal 7: Pt will complete household t/f with min A (anticipate start with stand pivot t/f using least restrictive AD)  Long term goals  Time Frame for Long term goals : In 3 - 4 weeks,  Long term goal 1: Pt will complete feeding with mod I for extra time to open containers  Long term goal 2: Pt will complete grooming seated with mod I for extra time using federico-technique   Long term goal 3: Pt will complete bathing with close SBA and setup seated on BSC  Long term goal 4: Pt will complete UE dressing with mod I for extra time   Long term goal 5: Pt will complete LE dressing & toileting with SBA and use of A/E  Long term goals 6: Pt will tolerate standing for 8 - 10 minutes with SBA during ADL participation   Long term goal 7: Pt will attend to L hemibody with min VCs during ADL participation  Long term goal 8: Pt will complete w/c mobility for 150 feet and household t/f with SBA   Patient Goals   Patient goals :  \"To get better, to get this back (referring to L UE)\"        Therapy Time   Individual Concurrent Group Co-treatment   Time In 1015         Time Out 1115         Minutes 60         Timed Code Treatment Minutes: 60 Minutes    Therapy Time     Individual Co-treatment   Time In [address]     Minutes [address], OTR/L 770

## 2019-08-22 NOTE — PROGRESS NOTES
Pt resting quietly in bed, family members at the bedside spending the night. Admitted with a CVA, left sided weakness. LUE flaccid, some decreased sensation. Transfers with a stedy x1. Tolerates well. Lungs CTA, HR regular. Abdomen non tender with active bowel sounds. Continent of bowel and bladder. Denies pain but complained of some LUE muscle spasms, medicated per PRN orders. All needs assessed with rounding, call light in reach at all times, will continue to monitor.  Leti Swift RN

## 2019-08-22 NOTE — PLAN OF CARE
Problem: Risk for Impaired Skin Integrity  Goal: Tissue integrity - skin and mucous membranes  Description  Structural intactness and normal physiological function of skin and  mucous membranes. 8/22/2019 1507 by Narcisa Walker RN  Outcome: Ongoing  Note:   Monitor no issues noted. 8/22/2019 0116 by Sharon Banks RN  Outcome: Ongoing  Note:   Able to change positions in bed without assist, no evidence of skin breakdown noted. Waffle cushion in place to chair. Problem: Falls - Risk of:  Goal: Will remain free from falls  Description  Will remain free from falls  8/22/2019 1507 by Narcisa Walker RN  Outcome: Ongoing  Note:   Family may transfer with quad cane per therapy, aware may need to do sit pivot if tired  8/22/2019 0116 by Sharon Banks RN  Outcome: Ongoing  Note:   Pt educated on falls precautions and safety. Call light and personal belongings within reach at all times. Non skid socks in use when up. Hourly rounding and alarms active. Goal: Absence of physical injury  Description  Absence of physical injury  8/22/2019 0116 by Sharon Banks RN  Outcome: Ongoing     Problem: ACTIVITY INTOLERANCE/IMPAIRED MOBILITY  Goal: Mobility/activity is maintained at optimum level for patient  8/22/2019 0116 by Sharon Banks RN  Outcome: Ongoing     Problem: HEMODYNAMIC STATUS  Goal: Patient has stable vital signs and fluid balance  8/22/2019 0116 by Sharon Banks RN  Outcome: Ongoing     Problem: Pain:  Goal: Pain level will decrease  Description  Pain level will decrease  8/22/2019 1507 by Narcisa Walker RN  Note:   Has denied pain at this time, continue to monitor. 8/22/2019 0116 by Sharon Banks RN  Outcome: Ongoing  Note:   Able to rate pain using a 1-10 scale, medicated per prn orders, see MAR.  Able to verbalize a reduction in pain and/or able to fall asleep and remain asleep without any s/s of pain    Goal: Control of acute pain  Description  Control of acute pain  8/22/2019 0116 by Mary Ontiveros RN  Outcome: Ongoing  Goal: Control of chronic pain  Description  Control of chronic pain  8/22/2019 0116 by Mary Ontiveros RN  Outcome: Ongoing     Problem: Nutrition  Goal: Optimal nutrition therapy  8/22/2019 1507 by Bk Sanchez RN  Outcome: Ongoing  Note:   Reviewed diet, (Cardiac) related to bp and CVA risk, family also aware. 8/22/2019 0116 by Mary Ontiveros RN  Outcome: Ongoing  Note:   PO intake encouraged      Problem: Risk for Impaired Skin Integrity  Goal: Tissue integrity - skin and mucous membranes  Description  Structural intactness and normal physiological function of skin and  mucous membranes. 8/22/2019 1507 by Bk Sanchez RN  Outcome: Ongoing  Note:   Monitor no issues noted. 8/22/2019 0116 by Mary Ontiveros RN  Outcome: Ongoing  Note:   Able to change positions in bed without assist, no evidence of skin breakdown noted. Waffle cushion in place to chair. Problem: Falls - Risk of:  Goal: Will remain free from falls  Description  Will remain free from falls  8/22/2019 1507 by Bk Sanchez RN  Outcome: Ongoing  Note:   Family may transfer with quad cane per therapy, aware may need to do sit pivot if tired  8/22/2019 0116 by Mary Ontiveros RN  Outcome: Ongoing  Note:   Pt educated on falls precautions and safety. Call light and personal belongings within reach at all times. Non skid socks in use when up. Hourly rounding and alarms active.     Goal: Absence of physical injury  Description  Absence of physical injury  8/22/2019 0116 by Mary Ontiveros RN  Outcome: Ongoing     Problem: ACTIVITY INTOLERANCE/IMPAIRED MOBILITY  Goal: Mobility/activity is maintained at optimum level for patient  8/22/2019 0116 by Mary Ontiveros RN  Outcome: Ongoing     Problem: HEMODYNAMIC STATUS  Goal: Patient has stable vital signs and fluid balance  8/22/2019 0116 by Mary Ontiveros RN  Outcome: Ongoing     Problem: Pain:  Goal:

## 2019-08-23 PROCEDURE — 6370000000 HC RX 637 (ALT 250 FOR IP): Performed by: PHYSICAL MEDICINE & REHABILITATION

## 2019-08-23 PROCEDURE — 97530 THERAPEUTIC ACTIVITIES: CPT

## 2019-08-23 PROCEDURE — 6360000002 HC RX W HCPCS: Performed by: PHYSICAL MEDICINE & REHABILITATION

## 2019-08-23 PROCEDURE — 92507 TX SP LANG VOICE COMM INDIV: CPT

## 2019-08-23 PROCEDURE — 97535 SELF CARE MNGMENT TRAINING: CPT

## 2019-08-23 PROCEDURE — 97116 GAIT TRAINING THERAPY: CPT | Performed by: PHYSICAL THERAPIST

## 2019-08-23 PROCEDURE — 1280000000 HC REHAB R&B

## 2019-08-23 PROCEDURE — 92526 ORAL FUNCTION THERAPY: CPT

## 2019-08-23 PROCEDURE — 97127 HC SP THER IVNTJ W/FOCUS COG FUNCJ: CPT

## 2019-08-23 PROCEDURE — 97112 NEUROMUSCULAR REEDUCATION: CPT

## 2019-08-23 PROCEDURE — 97110 THERAPEUTIC EXERCISES: CPT | Performed by: PHYSICAL THERAPIST

## 2019-08-23 PROCEDURE — 97530 THERAPEUTIC ACTIVITIES: CPT | Performed by: PHYSICAL THERAPIST

## 2019-08-23 PROCEDURE — 94760 N-INVAS EAR/PLS OXIMETRY 1: CPT

## 2019-08-23 RX ADMIN — PANTOPRAZOLE SODIUM 40 MG: 40 TABLET, DELAYED RELEASE ORAL at 05:50

## 2019-08-23 RX ADMIN — HYDRALAZINE HYDROCHLORIDE 25 MG: 25 TABLET, FILM COATED ORAL at 17:23

## 2019-08-23 RX ADMIN — AMLODIPINE BESYLATE 10 MG: 10 TABLET ORAL at 09:13

## 2019-08-23 RX ADMIN — SENNOSIDES, DOCUSATE SODIUM 1 TABLET: 50; 8.6 TABLET, FILM COATED ORAL at 21:27

## 2019-08-23 RX ADMIN — ATORVASTATIN CALCIUM 80 MG: 80 TABLET, FILM COATED ORAL at 21:27

## 2019-08-23 RX ADMIN — ACETAMINOPHEN 650 MG: 325 TABLET ORAL at 10:15

## 2019-08-23 RX ADMIN — POTASSIUM CHLORIDE 10 MEQ: 750 TABLET, EXTENDED RELEASE ORAL at 09:13

## 2019-08-23 RX ADMIN — BACLOFEN 5 MG: 10 TABLET ORAL at 21:28

## 2019-08-23 RX ADMIN — ENOXAPARIN SODIUM 40 MG: 40 INJECTION SUBCUTANEOUS at 09:12

## 2019-08-23 RX ADMIN — LISINOPRIL 10 MG: 10 TABLET ORAL at 21:27

## 2019-08-23 RX ADMIN — MENTHOL, METHYL SALICYLATE: 10; 15 CREAM TOPICAL at 14:29

## 2019-08-23 RX ADMIN — ASPIRIN 81 MG: 81 TABLET ORAL at 09:13

## 2019-08-23 RX ADMIN — MENTHOL, METHYL SALICYLATE: 10; 15 CREAM TOPICAL at 09:13

## 2019-08-23 RX ADMIN — CLOPIDOGREL BISULFATE 75 MG: 75 TABLET ORAL at 09:13

## 2019-08-23 ASSESSMENT — PAIN DESCRIPTION - PROGRESSION: CLINICAL_PROGRESSION: NOT CHANGED

## 2019-08-23 ASSESSMENT — PAIN SCALES - GENERAL
PAINLEVEL_OUTOF10: 0
PAINLEVEL_OUTOF10: 3
PAINLEVEL_OUTOF10: 0
PAINLEVEL_OUTOF10: 5

## 2019-08-23 ASSESSMENT — PAIN DESCRIPTION - ORIENTATION: ORIENTATION: LEFT

## 2019-08-23 ASSESSMENT — PAIN DESCRIPTION - DESCRIPTORS: DESCRIPTORS: ACHING;DISCOMFORT

## 2019-08-23 ASSESSMENT — PAIN DESCRIPTION - FREQUENCY: FREQUENCY: CONTINUOUS

## 2019-08-23 ASSESSMENT — PAIN - FUNCTIONAL ASSESSMENT: PAIN_FUNCTIONAL_ASSESSMENT: ACTIVITIES ARE NOT PREVENTED

## 2019-08-23 ASSESSMENT — PAIN DESCRIPTION - PAIN TYPE: TYPE: ACUTE PAIN

## 2019-08-23 ASSESSMENT — PAIN DESCRIPTION - LOCATION: LOCATION: SHOULDER

## 2019-08-23 ASSESSMENT — PAIN DESCRIPTION - ONSET: ONSET: ON-GOING

## 2019-08-23 NOTE — PROGRESS NOTES
Occupational Therapy  Facility/Department: [address] REHAB  Daily Treatment Note  AM and PM   NAME: Марина Robins  : 1970  MRN: [id number]    Date of Service: 2019    Discharge Recommendations:  Home with assist PRN, Home with Home health OT  OT Equipment Recommendations  Equipment Needed: Yes  Other: TBD pending pt progress    Assessment   Performance deficits / Impairments: Decreased functional mobility ; Decreased cognition;Decreased ADL status; Decreased ROM; Decreased strength;Decreased balance;Decreased sensation;Decreased coordination;Decreased fine motor control;Decreased high-level IADLs  Assessment: OT session focused on adaptive stratigies with federico-technique to don and doff socks/shoes. Patient with improved accuracy and performance with patient physically assisting and crossing LE vs. sock aid. Patient able to don and doff socks/shoes x6 trials with supervision/ VC and use of Reacher. Treatment Diagnosis: Impaired ADLs and functional mobility w/ L hemiparesis and L neglect   History: PMH includes: HTN. Exam: ADLs & functional mobility  OT Education: ADL Adaptive Strategies  Patient Education: Federico-dressing techniques to donning socks   REQUIRES OT FOLLOW UP: Yes  Activity Tolerance  Activity Tolerance: Patient Tolerated treatment well  Safety Devices  Safety Devices in place: Yes  Type of devices: Gait belt;Call light within reach;Nurse notified; Left in chair;Chair alarm in place       PM session: Patient completed functional mobility with cane to therapy mat. Patient tolerated weightbearing exercises through wrist, forearm, and shoulder with no reports of discomfort. Patient continues to be flaccid in LUE. Patient and caregiver (patient's mother) trained in safely assisting patient to bathroom for toileting needs. OT provided instruction on safely donning belt, sling, and providing CGA during transfers with cane and LB management.   RN notified of caregivers ability to assist patient Timed Code Treatment Minutes: 39 Minutes       Second Session Therapy Time:   Individual Concurrent Group Co-treatment   Time In 3828         Time Out 1610         Minutes 55           Timed Code Treatment Minutes:  55 Minutes    Total Treatment Minutes:   100 minutes       LAUREN Angulo/L

## 2019-08-23 NOTE — PLAN OF CARE
Problem: Risk for Impaired Skin Integrity  Goal: Tissue integrity - skin and mucous membranes  Description  Structural intactness and normal physiological function of skin and  mucous membranes. Outcome: Ongoing  Note:   Repositioning every 2 hours when in bed or chair. Problem: Falls - Risk of:  Goal: Will remain free from falls  Description  Will remain free from falls  Outcome: Ongoing  Note:   Patient free of falls this shift, all safety precautions in place. Goal: Absence of physical injury  Description  Absence of physical injury  Outcome: Ongoing  Note:   Responding to call lights immediately, ambulate to bathroom with appropriate assistance and asking patient every 2 hours if there is a need for toilet use. Problem: ACTIVITY INTOLERANCE/IMPAIRED MOBILITY  Goal: Mobility/activity is maintained at optimum level for patient  Outcome: Ongoing  Note:   Participates in all scheduled therapies, is cooperative and has a positive attitude towards achieving set goals. Problem: HEMODYNAMIC STATUS  Goal: Patient has stable vital signs and fluid balance  Outcome: Ongoing  Note:   Daily weight taken and charted in the morning. Problem: Pain:  Goal: Pain level will decrease  Description  Pain level will decrease  Outcome: Ongoing  Note:   Patient uses pain scale appropriately. Problem: Nutrition  Goal: Optimal nutrition therapy  Outcome: Ongoing  Note:   Staff assists with ordering of all meals as needed.

## 2019-08-23 NOTE — PROGRESS NOTES
Blood pressure 169/98, PRn hydralazine 25 mg given for BP >160/90. Will continue to monitor.  Electronically signed by Christen Ag RN on 8/23/2019 at 5:24 PM

## 2019-08-23 NOTE — PATIENT CARE CONFERENCE
[]Therapeutic Pass   [] Consults:_______    [] Other;_______    Patient Rehab Team Goals for the Upcoming Week:  1. Complete LB dressing with minimal assistance   2. Improve standing balance and household distance ambulation with wheeled walker to allow patient to maximize independence with self-care activities. 3. Pt will demonstrate carryover with new learning techniques; new learning ex programs with set up and intermittent assist  4. Patient goals : \"To get better, to get this back (referring to L UE)\"         Team Members Present at Conference:  Physician: Dr Jesenia Stanley   : NAPOLEON Hewitt    Occupational Therapist: Kena Butterfield OTR/L [address], [address] [facility]  Speech Therapist: Lavon Jane. Ofelia,MS,CCC,SLP [id number]  Nurse: Donna Estrella RN, CRRN  Dietician: Angely Gomez RD, LD   [facility]        I led this team conference and I approve the established interdisciplinary plan of care as documented within the medical record of Марина Robins.     MD: Dr. Jesenia Stanley

## 2019-08-23 NOTE — PROGRESS NOTES
Department of Physical Medicine & Rehabilitation  Progress Note    Patient Identification:  Frankey Rhine  [id number]  : 1970  Admit date: 2019    Chief Complaint: Right sided cerebral hemisphere cerebrovascular accident (CVA) ([address]    Subjective:   No acute events overnight. Patient seen this afternoon sitting up in the gym. She is feeling well. Her left shoulder pain is well controlled with ice, Bengay, and acetaminophen. Continues to make steady improvements with her left side function and ambulation distances. ROS: No f/c, n/v, cp     Objective:  Patient Vitals for the past 24 hrs:   BP Temp Temp src Pulse Resp SpO2 Weight   19 0931 -- -- -- -- 14 91 % --   19 0636 -- -- -- -- -- -- 219 lb 12.8 oz (99.7 kg)   19 0545 136/76 -- -- -- -- -- --   19 0523 (!) 161/90 98.1 °F (36.7 °C) Oral 73 17 98 % --     Const: Alert. No distress, pleasant. HEENT: Normocephalic, atraumatic. Normal sclera/conjunctiva. MMM. CV: Regular rate and rhythm. Resp: No respiratory distress. Lungs CTAB. Abd: Soft, nontender, nondistended, NABS+   Ext: No edema. Neuro: Alert, oriented, appropriately interactive. Left facial droop and hemiparesis. Psych: Cooperative, appropriate mood and affect    Laboratory data: Available via EMR. Last 24 hour lab  No results found for this or any previous visit (from the past 24 hour(s)). Therapy progress:  PT  Position Activity Restriction  Other position/activity restrictions: Impulsive, mild left neglect. Cardiac diet.   Objective     Sit to Stand: Stand by assistance, Contact guard assistance  Stand to sit: Stand by assistance, Contact guard assistance(cues to square up to chair)  Bed to Chair: Stand by assistance, Contact guard assistance(cues to turn safely to chair)  Device: Small Syed [place]  Other Apparatus: Left(Bobath sling)  Assistance: Minimal assistance, Contact guard assistance(mostly CGA, but did have one near loss of balance requiring up to min/CGA to recover balance)  Distance: 120', 170'   OT  PT Equipment Recommendations  Other: will continue to assess for least restrictive assistive device (LRAD)  Toilet - Technique: Ambulating  Equipment Used: Standard toilet  Toilet Transfers Comments: to/from Bill Pace  Assessment        SLP  Diet Solids Recommendation: Regular  Liquid Consistency Recommendation: Thin    Body mass index is 34.43 kg/m². Assessment and Plan:    Acute ischemic R MCA stroke (corona radiata) - Secondary ppx with ASA, Plavix, statin. PT/OT/SLP. Spasticity - improved with baclofen prn    Left shoulder pain - Likely due to mild subluxation and rotator cuff dysfunction in the setting of weakness. Ice, topical treatment, acetaminophen prn. HTN - Uncontrolled, now improving on current regimen. Amlodipine, lisinopril (changed to pm dosing), prn hydralazine. HLD - atorvastatin    Morbid Obesity - Dietician consult. Counseling and education. DVT ppx - Lovenox    Bladder - Monitor PVRs, straight cath prn >300cc    Bowel - Senna+colace, prn miralax, MoM and bosacodyl supp. Rehab progress: Making steady progress with functional mobility, left side function, speech, swallow. Anticipated Dispo: home with mother  Services:  PT, OT, SLP, RN, Aide  DME: TBD, likely federico-walker  ELOS: 3 weeks (~8/31)      Toan Shelton.  Neetu Coe MD 8/23/2019, 3:03 PM

## 2019-08-23 NOTE — PROGRESS NOTES
Physical Therapy  Facility/Department: [address] REHAB  Daily Treatment Note- AM and PM  NAME: Kirkland Babinski  : 1970  MRN: [id number]    Date of Service: 2019    Discharge Recommendations:  Patient would benefit from continued therapy after discharge, 5-7 sessions per week, Continue to assess pending progress   PT Equipment Recommendations  Equipment Needed: Yes  Other: will continue to assess for least restrictive assistive device (LRAD), most likely [facility] [employer] [place]    Assessment   Body structures, Functions, Activity limitations: Decreased functional mobility ; Decreased balance;Decreased strength; Increased Pain;Decreased endurance;Decreased sensation;Decreased coordination  Assessment: Patient has met 4/5 STG. Pt continues to make progress with LLE strength, balance and ambulation. She was able to ambulate 80' with [employer] [facility] [employer] support this morning, CGA, no LOB. patient does  fatigue with increase ataxia and needs several standing rest breaks. patient with noticable ataxia L LE especially as fatigue. patient able to transfer with SBA/CGA with occasional cues for safety to square up to the chair. Patient able to perform 12 steps with rail with CGA and ascend/ descend step with min/CGA. Patient performed bed mobility with SBA with cues for L UE. She would benefit from continued therapy to further improve her balance, LLE strength, increase L side awareness and independence ambulating with LRAD. Patient's plan is to go home to own house with 13 steps to enter. Anticipate 10 days prior to D/C home to obtain independent level of function. Treatment Diagnosis: Decreased functional mobility;  Difficulty walking; Decreased strength; Decreased ROM  Specific instructions for Next Treatment: car transfer, stairs  Decision Making: Medium Complexity  PT Education: Transfer Training;Gait Training;Functional Mobility Training;Family Education;Home Exercise Program  Barriers to Learning: slightly impulsive; some L sided Abduction: 15,  Ankle Pumps: 20      Second session  S/ Patient agreeable to therapy, tired but motivated to participate. O/ Patient sit to stand with SBA/CGA- performed steps and car transfers as bolded above. Performed side stepping 15' x 4 B directions without AD with CGA  In parallel bars, worked on mini squats x 15, lateral step ups with 4\" stool x 10 with resitance going into extension, hip abduction x 10 with L LE- therapist assist with WB through L LE, tapping with B LE x 20,   Patient ambulate short distances in gym 30' x 2 with CGA  Patient to OT session  Goals  Short term goals  Time Frame for Short term goals: In 7-10 days pt will perform  Short term goal 1: Bed mobility Supervision -   Short term goal 2: Transfers CGA-SBA - met 8/17  Short term goal 3: Ambulation 48' with LRAD, CGA - met 8/17  Short term goal 4: Ascend/Descend 12 steps with Min-CGA - met 8/23  Short term goal 5: Ascend/Descend curb step with Min A -met 8/23  Long term goals  Time Frame for Long term goals : In 14-21 days pt will perform  Long term goal 1: Bed Mobility independently  Long term goal 2: Transfers Mod I  Long term goal 3: Ambulation 80' with LRAD, Mod I  Long term goal 4: Ascend/Descend 12 steps with supervision  Long term goal 5: Ascend/Descend curb step with SBA  Patient Goals   Patient goals : To improve her arm strength, and leg strength, and to get back to walking, climbing stairs, and driving.      Plan    Plan  Times per week: 5-6  Times per day: Twice a day  Specific instructions for Next Treatment: car transfer, stairs  Current Treatment Recommendations: Functional Mobility Training, Balance Training, Strengthening, ROM, Transfer Training, Endurance Training, Gait Training, Stair training, Cognitive/Perceptual Training, Neuromuscular Re-education, Home Exercise Program, Safety Education & Training, Patient/Caregiver Education & Training, Equipment Evaluation, Education, & procurement  Safety Devices  Type of

## 2019-08-23 NOTE — PLAN OF CARE
Problem: Risk for Impaired Skin Integrity  Goal: Tissue integrity - skin and mucous membranes  Description  Structural intactness and normal physiological function of skin and  mucous membranes. Note:   Skin assessment performed each shift per protocol. Patient turns and repositions self. Patient is continent of bowel and bladder. Bottom remains intact, without redness. Heels elevated with pillows at bedtime. Problem: Falls - Risk of:  Goal: Will remain free from falls  Description  Will remain free from falls  Note:   Patient free from falls this shift. Fall precautions in place at all times. Bed in lowest position with two side rails up and wheels locked. Call light within reach. Patient able and agreeable to contact for safety appropriately. Assisted to bathroom with cane, gait belt. Patient follows safety instructions to avoid falls or injuries. Personal belongings remain within reach. Problem: ACTIVITY INTOLERANCE/IMPAIRED MOBILITY  Goal: Mobility/activity is maintained at optimum level for patient  Note:   Patient completes transfers independently in a safe manner. Patient able to sit up to side of bed independently as well as put both legs in bed independently. Left arm remains flaccid. Good strength noted to right upper ext., completes all toileting per self. Problem: Pain:  Description  Pain management should include both nonpharmacologic and pharmacologic interventions. Goal: Pain level will decrease  Description  Pain level will decrease  Note:   No complaints of pain or discomfort verbalized. Patient encouraged to inform staff of any changes in condition.

## 2019-08-24 PROCEDURE — 1280000000 HC REHAB R&B

## 2019-08-24 PROCEDURE — 97112 NEUROMUSCULAR REEDUCATION: CPT

## 2019-08-24 PROCEDURE — 94760 N-INVAS EAR/PLS OXIMETRY 1: CPT

## 2019-08-24 PROCEDURE — 6360000002 HC RX W HCPCS: Performed by: PHYSICAL MEDICINE & REHABILITATION

## 2019-08-24 PROCEDURE — 6370000000 HC RX 637 (ALT 250 FOR IP): Performed by: PHYSICAL MEDICINE & REHABILITATION

## 2019-08-24 RX ADMIN — CLOPIDOGREL BISULFATE 75 MG: 75 TABLET ORAL at 08:50

## 2019-08-24 RX ADMIN — POTASSIUM CHLORIDE 10 MEQ: 750 TABLET, EXTENDED RELEASE ORAL at 08:51

## 2019-08-24 RX ADMIN — LISINOPRIL 10 MG: 10 TABLET ORAL at 20:52

## 2019-08-24 RX ADMIN — ENOXAPARIN SODIUM 40 MG: 40 INJECTION SUBCUTANEOUS at 08:51

## 2019-08-24 RX ADMIN — SENNOSIDES, DOCUSATE SODIUM 1 TABLET: 50; 8.6 TABLET, FILM COATED ORAL at 20:52

## 2019-08-24 RX ADMIN — ATORVASTATIN CALCIUM 80 MG: 80 TABLET, FILM COATED ORAL at 20:52

## 2019-08-24 RX ADMIN — PANTOPRAZOLE SODIUM 40 MG: 40 TABLET, DELAYED RELEASE ORAL at 06:34

## 2019-08-24 RX ADMIN — AMLODIPINE BESYLATE 10 MG: 10 TABLET ORAL at 08:50

## 2019-08-24 RX ADMIN — BACLOFEN 5 MG: 10 TABLET ORAL at 20:52

## 2019-08-24 RX ADMIN — ASPIRIN 81 MG: 81 TABLET ORAL at 08:51

## 2019-08-24 ASSESSMENT — PAIN DESCRIPTION - PAIN TYPE: TYPE: ACUTE PAIN

## 2019-08-24 ASSESSMENT — PAIN DESCRIPTION - ORIENTATION: ORIENTATION: LEFT

## 2019-08-24 ASSESSMENT — PAIN SCALES - GENERAL
PAINLEVEL_OUTOF10: 0
PAINLEVEL_OUTOF10: 5
PAINLEVEL_OUTOF10: 0

## 2019-08-24 ASSESSMENT — PAIN DESCRIPTION - LOCATION: LOCATION: SHOULDER

## 2019-08-24 NOTE — PROGRESS NOTES
MD  Diagnosis: CVA  Subjective  Subjective: Pt seen BS, sitting up in recliner. Pt agreeable to OT, stating \"I want to work on my arm\"  General Comment  Comments: Pt reporting PT had to remove kinesio tape due to peeling. Pt requested to have kinesio tape replaced. Orientation  Orientation  Overall Orientation Status: Within Functional Limits  Objective             Standing Balance  Time: 3 min  Activity: standing at bedside table for UE ex  Comment: sit><stand at recliner with SBA                       Neuromuscular Education  Neuromuscular education: Yes  NDT Treatment: Sitting;Standing  Joint Compression: thru wrist, extended arm in stance; seated, onto elbow/forearm over table top  Functional Movement Patterns: sitting/standing for wt bearing, completing table dusting, 'push up' ex instanding and 'gear shift' ex with cane, with facilitation for L UE shoulder protraction-retraction, elbow flex-ext. Pt with minimal isolated movement. Cues, assist to avoid substitution mov't     Cognition  Overall Cognitive Status: WFL     Perception  Overall Perceptual Status: Impaired  Unilateral Attention: Cues to attend to left side of body;Cues to attend left visual field           Upper Extremity Function  NDT Treatment: Sitting;Standing            Plan   Plan  Times per week: 5 - 7  Times per day: Twice a day  Plan weeks: 3 - 4 weeks  Current Treatment Recommendations: Strengthening, ROM, Balance Training, Functional Mobility Training, Endurance Training, Neuromuscular Re-education, Equipment Evaluation, Education, & procurement, Self-Care / ADL, Patient/Caregiver Education & Training, Safety Education & Training, Positioning  Plan Comment: MON conf      Goals  Short term goals  Time Frame for Short term goals:  In 1 - 2 weeks,   Short term goal 1: Pt will complete feeding with setup of containers    Short term goal 2: Pt will complete grooming seated with min A - GOAL MET   Short term goal 3: Pt will complete bathing

## 2019-08-25 PROCEDURE — 1280000000 HC REHAB R&B

## 2019-08-25 PROCEDURE — 94760 N-INVAS EAR/PLS OXIMETRY 1: CPT

## 2019-08-25 PROCEDURE — 6370000000 HC RX 637 (ALT 250 FOR IP): Performed by: PHYSICAL MEDICINE & REHABILITATION

## 2019-08-25 PROCEDURE — 6360000002 HC RX W HCPCS: Performed by: PHYSICAL MEDICINE & REHABILITATION

## 2019-08-25 RX ADMIN — SENNOSIDES, DOCUSATE SODIUM 1 TABLET: 50; 8.6 TABLET, FILM COATED ORAL at 21:12

## 2019-08-25 RX ADMIN — LISINOPRIL 10 MG: 10 TABLET ORAL at 21:12

## 2019-08-25 RX ADMIN — PANTOPRAZOLE SODIUM 40 MG: 40 TABLET, DELAYED RELEASE ORAL at 05:25

## 2019-08-25 RX ADMIN — ATORVASTATIN CALCIUM 80 MG: 80 TABLET, FILM COATED ORAL at 21:12

## 2019-08-25 RX ADMIN — BACLOFEN 5 MG: 10 TABLET ORAL at 21:12

## 2019-08-25 RX ADMIN — POTASSIUM CHLORIDE 10 MEQ: 750 TABLET, EXTENDED RELEASE ORAL at 09:49

## 2019-08-25 RX ADMIN — CLOPIDOGREL BISULFATE 75 MG: 75 TABLET ORAL at 09:50

## 2019-08-25 RX ADMIN — AMLODIPINE BESYLATE 10 MG: 10 TABLET ORAL at 09:49

## 2019-08-25 RX ADMIN — ASPIRIN 81 MG: 81 TABLET ORAL at 09:49

## 2019-08-25 RX ADMIN — ENOXAPARIN SODIUM 40 MG: 40 INJECTION SUBCUTANEOUS at 09:50

## 2019-08-25 ASSESSMENT — PAIN DESCRIPTION - PAIN TYPE: TYPE: ACUTE PAIN

## 2019-08-25 ASSESSMENT — PAIN DESCRIPTION - LOCATION: LOCATION: SHOULDER

## 2019-08-25 ASSESSMENT — PAIN DESCRIPTION - ORIENTATION: ORIENTATION: LEFT

## 2019-08-25 ASSESSMENT — PAIN SCALES - GENERAL: PAINLEVEL_OUTOF10: 5

## 2019-08-25 NOTE — PROGRESS NOTES
Pt awake and AAO sitting up in chair requesting to use the BR. Pt up from chair to BR with quad cane and CGA. Pt does where strap to L arm when up. Pt voided and returned to bed. Pt s/p CVA with L federico. L arm is flaccid and LLE weak. C/o pain 5/10 to L shoulder and requesting muscle relaxer. No dysphasia or aphasia noted. Lungs clear. No sob or cough. On RA. Belly round and soft with active BS. LBM today. Continent of urine. No edema noted. Call light in reach. Visitors at bedside.

## 2019-08-26 LAB
ANION GAP SERPL CALCULATED.3IONS-SCNC: 12 MMOL/L (ref 3–16)
BASOPHILS ABSOLUTE: 0 K/UL (ref 0–0.2)
BASOPHILS RELATIVE PERCENT: 0.5 %
BUN BLDV-MCNC: 15 MG/DL (ref 7–20)
CALCIUM SERPL-MCNC: 8.9 MG/DL (ref 8.3–10.6)
CHLORIDE BLD-SCNC: 105 MMOL/L (ref 99–110)
CO2: 24 MMOL/L (ref 21–32)
CREAT SERPL-MCNC: 0.6 MG/DL (ref 0.6–1.1)
EOSINOPHILS ABSOLUTE: 0.2 K/UL (ref 0–0.6)
EOSINOPHILS RELATIVE PERCENT: 3.1 %
GFR AFRICAN AMERICAN: >60
GFR NON-AFRICAN AMERICAN: >60
GLUCOSE BLD-MCNC: 103 MG/DL (ref 70–99)
HCT VFR BLD CALC: 31.6 % (ref 36–48)
HEMOGLOBIN: 10.4 G/DL (ref 12–16)
LYMPHOCYTES ABSOLUTE: 0.8 K/UL (ref 1–5.1)
LYMPHOCYTES RELATIVE PERCENT: 17.4 %
MCH RBC QN AUTO: 26.2 PG (ref 26–34)
MCHC RBC AUTO-ENTMCNC: 32.9 G/DL (ref 31–36)
MCV RBC AUTO: 79.6 FL (ref 80–100)
MONOCYTES ABSOLUTE: 0.3 K/UL (ref 0–1.3)
MONOCYTES RELATIVE PERCENT: 5.7 %
NEUTROPHILS ABSOLUTE: 3.5 K/UL (ref 1.7–7.7)
NEUTROPHILS RELATIVE PERCENT: 73.3 %
PDW BLD-RTO: 17.5 % (ref 12.4–15.4)
PLATELET # BLD: 223 K/UL (ref 135–450)
PMV BLD AUTO: 9.4 FL (ref 5–10.5)
POTASSIUM REFLEX MAGNESIUM: 4 MMOL/L (ref 3.5–5.1)
RBC # BLD: 3.97 M/UL (ref 4–5.2)
SODIUM BLD-SCNC: 141 MMOL/L (ref 136–145)
WBC # BLD: 4.8 K/UL (ref 4–11)

## 2019-08-26 PROCEDURE — 94760 N-INVAS EAR/PLS OXIMETRY 1: CPT

## 2019-08-26 PROCEDURE — 97127 HC SP THER IVNTJ W/FOCUS COG FUNCJ: CPT

## 2019-08-26 PROCEDURE — 97110 THERAPEUTIC EXERCISES: CPT | Performed by: PHYSICAL THERAPIST

## 2019-08-26 PROCEDURE — 6370000000 HC RX 637 (ALT 250 FOR IP): Performed by: PHYSICAL MEDICINE & REHABILITATION

## 2019-08-26 PROCEDURE — 97116 GAIT TRAINING THERAPY: CPT | Performed by: PHYSICAL THERAPIST

## 2019-08-26 PROCEDURE — 36415 COLL VENOUS BLD VENIPUNCTURE: CPT

## 2019-08-26 PROCEDURE — 6360000002 HC RX W HCPCS: Performed by: PHYSICAL MEDICINE & REHABILITATION

## 2019-08-26 PROCEDURE — 97530 THERAPEUTIC ACTIVITIES: CPT

## 2019-08-26 PROCEDURE — 80048 BASIC METABOLIC PNL TOTAL CA: CPT

## 2019-08-26 PROCEDURE — 92526 ORAL FUNCTION THERAPY: CPT

## 2019-08-26 PROCEDURE — 97530 THERAPEUTIC ACTIVITIES: CPT | Performed by: PHYSICAL THERAPIST

## 2019-08-26 PROCEDURE — 97535 SELF CARE MNGMENT TRAINING: CPT

## 2019-08-26 PROCEDURE — 85025 COMPLETE CBC W/AUTO DIFF WBC: CPT

## 2019-08-26 PROCEDURE — 1280000000 HC REHAB R&B

## 2019-08-26 RX ADMIN — POTASSIUM CHLORIDE 10 MEQ: 750 TABLET, EXTENDED RELEASE ORAL at 09:37

## 2019-08-26 RX ADMIN — ATORVASTATIN CALCIUM 80 MG: 80 TABLET, FILM COATED ORAL at 21:19

## 2019-08-26 RX ADMIN — ACETAMINOPHEN 650 MG: 325 TABLET ORAL at 13:18

## 2019-08-26 RX ADMIN — ENOXAPARIN SODIUM 40 MG: 40 INJECTION SUBCUTANEOUS at 09:37

## 2019-08-26 RX ADMIN — ASPIRIN 81 MG: 81 TABLET ORAL at 09:37

## 2019-08-26 RX ADMIN — PANTOPRAZOLE SODIUM 40 MG: 40 TABLET, DELAYED RELEASE ORAL at 05:12

## 2019-08-26 RX ADMIN — LISINOPRIL 10 MG: 10 TABLET ORAL at 21:19

## 2019-08-26 RX ADMIN — CLOPIDOGREL BISULFATE 75 MG: 75 TABLET ORAL at 09:37

## 2019-08-26 RX ADMIN — SENNOSIDES, DOCUSATE SODIUM 1 TABLET: 50; 8.6 TABLET, FILM COATED ORAL at 21:19

## 2019-08-26 RX ADMIN — HYDRALAZINE HYDROCHLORIDE 25 MG: 25 TABLET, FILM COATED ORAL at 05:12

## 2019-08-26 RX ADMIN — AMLODIPINE BESYLATE 10 MG: 10 TABLET ORAL at 09:37

## 2019-08-26 ASSESSMENT — PAIN SCALES - GENERAL
PAINLEVEL_OUTOF10: 6
PAINLEVEL_OUTOF10: 2

## 2019-08-26 ASSESSMENT — PAIN DESCRIPTION - PAIN TYPE: TYPE: ACUTE PAIN

## 2019-08-26 ASSESSMENT — PAIN DESCRIPTION - LOCATION: LOCATION: SHOULDER

## 2019-08-26 ASSESSMENT — PAIN DESCRIPTION - DESCRIPTORS: DESCRIPTORS: ACHING

## 2019-08-26 ASSESSMENT — PAIN DESCRIPTION - ORIENTATION: ORIENTATION: LEFT

## 2019-08-26 NOTE — PROGRESS NOTES
Pt awake and AAO sitting on edge of bed. Visitors at bedside. Pt celebrated her Starleen Jacob today. Pt c/o 5/10 pain to L shoulder and requested Baclofen. Medicated per prn orders. Pt s/p CVA with L federico. L arm is flaccid and elevated on pillow. L LE is weak. No aphasia or dysphasia noted. Lungs CTA. No sob or cough. On RA> Belly round and soft with active BS. LBM today. Continent of B and B. Trace edema to LLE noted. Call light in reach.

## 2019-08-26 NOTE — PROGRESS NOTES
impulsive; some L sided neglect  Activity Tolerance  Activity Tolerance: Patient limited by fatigue;Patient limited by endurance; Patient Tolerated treatment well     Patient Diagnosis(es): There were no encounter diagnoses. has a past medical history of Hypertension. has a past surgical history that includes  section. Restrictions  Restrictions/Precautions  Restrictions/Precautions: Fall Risk  Position Activity Restriction  Other position/activity restrictions: Impulsive, mild left neglect. Cardiac diet. Subjective   General  Chart Reviewed: Yes  Additional Pertinent Hx: Per Dr. Amparo Medeiros, , \"51 y.o. female who presented with facial weakness and progressed to having left face and arm weakness in setting of hypertension overnight. CTA with bilateral soft plaque at bifurcation. Exam with left face and arm weakness much more than leg and right gaze preference suggesting of cortical right MCA stroke. Patient found to have thrombotic right MCA stroke on MRI in deep right corona radiata area. Patient started on DAPT with aspirin and Plavix, blood pressure medication, and patient was started in therapies. Patient is well below her baseline function of being Ind without an AD and working full time; pt currently is needing assist of 1-2 for mobility tasks and needing pedrito stedy lift for transfers with nursing and A of 2 for short distance gait with hemiwalker. Patient needs more therapy before discharge to home, where she lives with her son in a 2nd floor apartment. Patient was approved by her insurance for rehab unit admission today. She is agreeable to Rehab Unit treatment\"  Response To Previous Treatment: Patient with no complaints from previous session. Family / Caregiver Present: Yes(mother)  Referring Practitioner: Dr. Francis Spore: Patient agreeable to therapy, would like sons to be checked out for toileting. Patient does shoulder with pain ROM.   General Comment  Comments: pt very particular and distracted by cleanliness of self and surroundings          Orientation  Orientation  Overall Orientation Status: Within Normal Limits  Cognition      Objective      Transfers  Sit to Stand: Stand by assistance;Contact guard assistance  Stand to sit: Stand by assistance;Contact guard assistance(cues to square up to chair)  Bed to Chair: Stand by assistance;Contact guard assistance(cues to turn safely to chair)  Car Transfer: Stand by assistance;Contact guard assistance  Comment: with SBQC, L UE in Bobath sling  Ambulation  Ambulation?: Yes  Ambulation 1  Surface: level tile;carpet  Device: Small Syed [place]  Other Apparatus: Left(Bobath sling)  Assistance: Contact guard assistance;Stand by assistance(mostly CGA, but did have one near loss of balance requiring up to min/CGA to recover balance)  Quality of Gait: Patient with slow stephanie, decreased swing through with L LE tends to keep extended with increase WB on R side, able to clear floor, cued for heel strike- more foot flat, L LE extended through stance using extensor tone, decreased recurvatum noted on stance with L LE.  Patient c/o of fatigue and feeling shakey last 30'  Gait Deviations: Slow Stephanie;Decreased step length;Decreased step height;Decreased arm swing;Decreased head and trunk rotation  Distance: 150' , short distances in room  Comments: encouraged to look ahead to anticipate obstacles and deviate path accordingly, needs cues to turn towards L side  Stairs/Curb  Stairs?: Yes  Stairs  # Steps : 13  Stairs Height: 6\"  Rails: Right ascending  Other Apparatus: Left(bobath sling)  Assistance: Contact guard assistance  Comment: Patient CGA with rail on R side, tends to circumduct L LE upon ascending, ataxia noting descending               Strength RLE  Strength RLE: Exception  Other Activities: Other (see comment)  Comment: Patient able to toilet with close SBA, assist to doff pants due to required assist to tie pants, used grab bar for balance, + void       Goals  Short term goals  Time Frame for Short term goals: In 7-10 days pt will perform  Short term goal 1: Bed mobility Supervision -   Short term goal 2: Transfers CGA-SBA - met 8/17  Short term goal 3: Ambulation 48' with LRAD, CGA - met 8/17  Short term goal 4: Ascend/Descend 12 steps with Min-CGA - met 8/23  Short term goal 5: Ascend/Descend curb step with Min A -met 8/23  Long term goals  Time Frame for Long term goals : In 14-21 days pt will perform  Long term goal 1: Bed Mobility independently  Long term goal 2: Transfers Mod I  Long term goal 3: Ambulation 80' with LRAD, Mod I  Long term goal 4: Ascend/Descend 12 steps with supervision  Long term goal 5: Ascend/Descend curb step with SBA  Patient Goals   Patient goals : To improve her arm strength, and leg strength, and to get back to walking, climbing stairs, and driving. Plan    Plan  Times per week: 5-6  Times per day: Twice a day  Specific instructions for Next Treatment: curb , balance activities, strengthening  Current Treatment Recommendations: Functional Mobility Training, Balance Training, Strengthening, ROM, Transfer Training, Endurance Training, Gait Training, Stair training, Cognitive/Perceptual Training, Neuromuscular Re-education, Home Exercise Program, Safety Education & Training, Patient/Caregiver Education & Training, Equipment Evaluation, Education, & procurement  Safety Devices  Type of devices:  All fall risk precautions in place, Gait belt, Patient at risk for falls  Restraints  Initially in place: No     Therapy Time   Individual Concurrent Group Co-treatment   Time In 1120         Time Out 1205         Minutes 45         Timed Code Treatment Minutes: [address], [address] [facility]

## 2019-08-26 NOTE — PROGRESS NOTES
Speech Language Pathology  ACUTE REHAB UNIT  SPEECH/LANGUAGE PATHOLOGY      [x] Daily  [] Weekly Care Conference Note  [] Discharge    Patient: Oscar Bo      :1970  GGL:[id number]  Rehab Dx/Hx: Right sided cerebral hemisphere cerebrovascular accident (CVA) ([facility] [address] [I63.9]    Precautions: FAlls; Dysphagia complaints  Home situation: Lives with grown son   Dx: [] Aphasia  [] Dysarthria  [] Apraxia   [x] Oropharyngeal dysphagia [x] Cognitive   Impairment  [] Other:   Initial Speech Therapy Assessment Diagnosis:   1. Cognitive Diagnosis: [place] cogntion intact except for mild deficits in working memory and complex processing. Further assessment in complex cognitive linuistic skills for executive function. 2. Speech Diagnosis: Pt with mild dysarthria characterized by left sided weakess resulting in mild phonemic distortions and syllable reductions. Pt has a rapid rate of speech. Pt's speech is adible and >90% intellgibile. 3. Communication Diagnosis: Pt with deficits in complex processing. This may be pre-morbid. Ongoing assessment warranted. 4. Dysphagia Diagnosis: Mild to moderate Oropharyngeal Dysphagia characterized by prolonged mastication using only the right side, reduced bolus control for A-P oral transit, potential delayed trigger of the swallow, potential reduced laryngeal elevation. Symptoms: Complaints of biting left cheek and left side of tongue; Pocketing of food in left buccal cavity; Minimal oral residue cleared with liquid wash; No clinical s/s of penetration aspiration; Pt with one complaint that something was \"stuck\", but was able to swallow it successfully with a liquid wash. Pt states she is unsure if swallowing difficulty is \"just because I am scared to swallow\". Date of Admit: 2019  Room #: E2K-9042/3270-01  Date: 2019       Current functional status (updated daily):         Current Diet Order:DIET CARDIAC;   Dietary Nutrition Supplements: Standard High Calorie Oral Supplement   Communication: []WFL  [] Aphasia  [x] Dysarthria  [] Apraxia  [] Pragmatic Impairment [] Non-verbal  [] Hearing Loss  [x] Other: Most optimal for concrete  Cognition: [] WFL  [x] Mild  [] Moderate  [] Severe [] Unable to Assess  [] Other:  Memory: [x] WFL  [x] Mild  [] Moderate  [] Severe [] Unable to Assess  [] Other:  Behavior: [x] Alert  [x] Cooperative  [x]  Pleasant  [] Confused  [] Agitated  [] Uncooperative  [] Distractible [] Motivated  [] Self-Limiting [] Anxious  [x] Other: self reports anxious/nervous at times; can be impulsive  Endurance:  [x] Adequate for participation in SLP sessions  [] Reduced overall  [] Lethargic  [] Other:  Safety: [x] No concerns at this time  [] Reduced insight into deficits  []  Reduced safety awareness [] Not following call light procedures  [] Unable to Assess  [] Other:  Swallowing Precautions: Sit up for all meals and thereafter for 30 minutes, Eat with small bites (1/2 tsp; 1 tsp), Dry swallow after each bite/sip, Check mouth for food residue after snacks and meals and Take your medication with apple sauce  Barriers toward progress: caregiver support may be a barrier         Date: 8/26/2019      Tx session 1 Tx session 2   Total Timed Code Min SLP Individual Minutes  Time In: 9561  Time Out: 9985  Minutes: 44  Coded treatment time  25  n/a   Group Treatment Minutes 0 0   Co-Treat Minutes 0 0   Variance/Reason:  n/a n/a   Pain n/a n/a   Pain Intervention [] RN notified  [] Repositioned  [] Intervention offered and patient declined  [] N/A  [] Other: [] RN notified  [] Repositioned  [] Intervention offered and patient declined  [] N/A  [] Other:   Subjective     Pt seen in treatment office  Pt's mother was present for part of session      Objective:  Goals      Pt goal is to \"eat without fear of choking and without biting left cheek and tongue\"  Therapy working toward pt goal    Dysphagia Goals:   1.  The patient will tolerate regular consistency diet without observed clinical signs of aspiration,    No new complaints n/a   2. The patient/caregiver will demonstrate understanding of compensatory strategies for improved swallowing safety.,  Not targeted n/a   3. Upgraded goal  The patient will improve oral motor function via therapeutic oral motor exercises to 15/15 each trial., Tactile Graceann Muta stimulation via use of tactile message; icing and NMES vital stimulation (oropharyngeal sling placement at 9.5mA for 27 minutes)    Left o-m rom  -tactile stimulation with icing well tolerated emphasizing left labial buccal: not targeted  -volitional lip rounding: continued   -volitional lower lip retraction:  continued   -volitional left labial/buccal retraction:continued   -volitional lip protrusion:  continued   -trapping air in mouth:  0% air emission  - emphasizing labial rom upper/lower keeping dentition occluded to maximize rom: gradual improvement as ex progressed  -audible oral suckling: not targeted    Left labial/buccal strengthening  -strengthening ex via resistance emphasizing labial/buccal muscular engagement for labial/buccal seal: warm up   -simulation bolus manipulation laterally: continued; pt is consistently achieving >17 reps each trial    Improving sensation and improving pt sensation of when getting engagement of musculature     n/a       4. The patient will complete laryngeal/pharyngeal exercises 15/15)  goal appears met  D/c n/a   Short-term Goals  Pt goal is \"to get back to work and to get my speech back, it gets so slurred when I am tired\" Therapy working toward pt goal      Goal 1: Pt will demonstrate improved articulatory precision via oral motor exercises over 90%.   Goal met n/a   Goal 2: Pt will demonstrate improved working memory for new learning techniques; new information; and rule application with set up, use of compensatory strategies and intermittent assist.  Working memory for :   -rule application for 15 task trials: 0 re-direct STM:   -recall of labial/buccal/lingual ex: set up.   -recall of oral motor speech strategies: IND  -recall of learned techniques to compensate for left weakness: reinforced; intermittent cues  -recall of  Today's therapy: 80%   n/a   Goal 3: The pt will particiapte in further assessment of higher level cogntiive linguistic skills for advanced daily living executive function  Visual scanning: (all on 8x11 pages of 10 to 40 stimuli)  -symbol recognition:  on a 8x11 page of >25 stimulus  -shape discrim for rule application of small to large: 100%  -alternating shapes: 100%  -alternating shapes and sizes: 100%  -[place]il [place] B: 100%  -letter scannin%  -2 concept (4 unit): letter scannin%  -drawing a clock: 100% for spacing of numbers and drawing hands for targeted time  -copying 5-8 line drawings: <10% errors (errors of occasional incomplete line connections right and left of midline)  -copying >20 line drawing: <5% errors (errors of occasional incomplete line connections right of midline)   n/a   Other areas targeted:     Education:   Ongoing pt ed    Safety Devices: [x] Call light within reach  [x] Chair alarm activated  [] Bed alarm activated  [x] Other: parent at her side [] Call light within reach  [] Chair alarm activated  [] Bed alarm activated  [] Other:    Progress Assessment: 8/15/2019: Pt/family ed in oral motor ex program and swallow strategies/ex. [place] math for counting money intact on testing; minimal to mild complex math word problems intact on testing. Plan: Continue as per plan of care. 2019: Initiated NMES vital stimulation (Oropahrygnela Sling). Well tolerated-will continue.  Continue all goals for next week   Continued Tx Upon Discharge: ?    [] Yes [] No [] TBD based on progress while on ARU [] Vital Stim indicated [] Other:   Estimated discharge date:    Discharge recommendations:   [] Home independently  [] Home with assistance []  24 hour supervision  [] ECF []

## 2019-08-26 NOTE — PROGRESS NOTES
Patient is a 51 y/o female admitted to rehab with stroke. A/A/O x 4. Transfers with assist of quad cane. On cardiac diet, tolerating well. Medications taken whole in water. On lovenox for DVT prophylaxis. Skin intact. On room air. Has been comtinent of urine and stool. LBM 8/25. Chair/bed alarms in use and call light in reach. Will monitor for safety.

## 2019-08-26 NOTE — PROGRESS NOTES
Equipment Recommendations  Equipment Needed: Yes  Other: will continue to assess for least restrictive assistive device (LRAD), most likely [employer] [facility]  Toilet - Technique: Ambulating  Equipment Used: Standard toilet  Toilet Transfers Comments: to/from Val [facility]ala          SLP:   Cognitive Diagnosis: [place] cogntion intact except for mild deficits in working memory and complex processing. Further assessment in complex cognitive linuistic skills for executive function. Speech Diagnosis: Pt with mild dysarthria characterized by left sided weakess resulting in mild phonemic distortions and syllable reductions. Pt has a rapid rate of speech. Pt's speech is adible and >90% intellgibile. Communication Diagnosis: Pt with deficits in complex processing. This may be pre-morbid. Ongoing assessment warranted. Dysphagia Diagnosis: Mild to moderate oral stage dysphagia, Mild pharyngeal stage dysphagia  Dysphagia Outcome Severity Scale: Level 5: Mild dysphagia- Distant supervision. May need one diet consistency restricted       Assessment and Plan:        Acute infarct within the right mid corona radiata -Plavix, aspirin      Hypertensive urgency with a hx of HTN -norvasc, hydralazine prn     Mixed hyperlipidemia- Lipitor     Obesity with BMI of 36.43     Bowels: Schedule Miralax + Senna S. Follow bowel movements. Enema or suppository if needed.      Bladder: Check PVR x 3.   [address] if PVR > 200ml or if any volume is > 500 ml.      Pain: Tylenol is ordered prn.        Bhavna Adams MD, 8/26/2019, 7:35 AM

## 2019-08-26 NOTE — PROGRESS NOTES
PT FOLLOW UP: Yes  Activity Tolerance  Activity Tolerance: Patient limited by fatigue;Patient limited by endurance; Patient Tolerated treatment well     Patient Diagnosis(es): There were no encounter diagnoses. has a past medical history of Hypertension. has a past surgical history that includes  section. Restrictions  Restrictions/Precautions  Restrictions/Precautions: Fall Risk  Position Activity Restriction  Other position/activity restrictions: Impulsive, mild left neglect. Cardiac diet. Subjective   General  Chart Reviewed: Yes  Additional Pertinent Hx: Per Dr. Sheryle Moccasin, , \"51 y.o. female who presented with facial weakness and progressed to having left face and arm weakness in setting of hypertension overnight. CTA with bilateral soft plaque at bifurcation. Exam with left face and arm weakness much more than leg and right gaze preference suggesting of cortical right MCA stroke. Patient found to have thrombotic right MCA stroke on MRI in deep right corona radiata area. Patient started on DAPT with aspirin and Plavix, blood pressure medication, and patient was started in therapies. Patient is well below her baseline function of being Ind without an AD and working full time; pt currently is needing assist of 1-2 for mobility tasks and needing pedrito stedy lift for transfers with nursing and A of 2 for short distance gait with hemiwalker. Patient needs more therapy before discharge to home, where she lives with her son in a 2nd floor apartment. Patient was approved by her insurance for rehab unit admission today. She is agreeable to Rehab Unit treatment\"  Response To Previous Treatment: Patient with no complaints from previous session. Family / Caregiver Present: Yes(mother)  Referring Practitioner: Dr. Kris Beard: Pt expressed frustration over having therapy moved from 2:30pm to 1:45pm. Pt would like to be notified of changes to therapy times ASAP in the future.  Reports

## 2019-08-26 NOTE — PROGRESS NOTES
safety  Problem Solving: Assistance required to implement solutions;Assistance required to generate solutions  Initiation: Requires cues for some  Sequencing: Requires cues for some  Cognition Comment: very talkative, frequent redirection required, improving w/ safety awareness, alerting OT when ready to stand                                         Plan   Plan  Times per week: 5 - 7  Times per day: Twice a day  Plan weeks: 10 more days  Current Treatment Recommendations: Strengthening, ROM, Balance Training, Functional Mobility Training, Endurance Training, Neuromuscular Re-education, Equipment Evaluation, Education, & procurement, Self-Care / ADL, Patient/Caregiver Education & Training, Safety Education & Training, Positioning  Plan Comment: MON conf       Goals  Short term goals  Time Frame for Short term goals: In 1 - 2 weeks,   Short term goal 1: Pt will complete feeding with setup of containers  --goal met 8/26/19  Short term goal 2: Pt will complete grooming seated with min A --goal met 8/26/19  Short term goal 3: Pt will complete bathing with min A seated on BSC  Goal Met  Short term goal 4: Pt will complete UE dressing with min A and use of federico techniques --goal met 8/26/19  Short term goal 5: Pt will complete LE dressing with min A and use of A/E--goal met 8/26/19  Short term goal 6: Pt will complete toileting with min A   Short term goal 7: Pt will complete household t/f with min A (anticipate start with stand pivot t/f using least restrictive AD)--goal met 8/26/19  Long term goals  Time Frame for Long term goals :  In 3 - 4 weeks,  Long term goal 1: Pt will complete feeding with mod I for extra time to open containers  Long term goal 2: Pt will complete grooming seated with mod I for extra time using federico-technique   Long term goal 3: Pt will complete bathing with close SBA and setup seated on BSC  Long term goal 4: Pt will complete UE dressing with mod I for extra time   Long term goal 5: Pt will

## 2019-08-27 PROCEDURE — 92526 ORAL FUNCTION THERAPY: CPT

## 2019-08-27 PROCEDURE — 1280000000 HC REHAB R&B

## 2019-08-27 PROCEDURE — 97110 THERAPEUTIC EXERCISES: CPT

## 2019-08-27 PROCEDURE — 94760 N-INVAS EAR/PLS OXIMETRY 1: CPT

## 2019-08-27 PROCEDURE — 97530 THERAPEUTIC ACTIVITIES: CPT

## 2019-08-27 PROCEDURE — 97127 HC SP THER IVNTJ W/FOCUS COG FUNCJ: CPT

## 2019-08-27 PROCEDURE — 97110 THERAPEUTIC EXERCISES: CPT | Performed by: PHYSICAL THERAPIST

## 2019-08-27 PROCEDURE — 6370000000 HC RX 637 (ALT 250 FOR IP): Performed by: PHYSICAL MEDICINE & REHABILITATION

## 2019-08-27 PROCEDURE — 97116 GAIT TRAINING THERAPY: CPT | Performed by: PHYSICAL THERAPIST

## 2019-08-27 PROCEDURE — 97530 THERAPEUTIC ACTIVITIES: CPT | Performed by: PHYSICAL THERAPIST

## 2019-08-27 PROCEDURE — 97535 SELF CARE MNGMENT TRAINING: CPT

## 2019-08-27 PROCEDURE — 6360000002 HC RX W HCPCS: Performed by: PHYSICAL MEDICINE & REHABILITATION

## 2019-08-27 RX ORDER — LIDOCAINE 4 G/G
1 PATCH TOPICAL DAILY
Status: DISCONTINUED | OUTPATIENT
Start: 2019-08-27 | End: 2019-09-04 | Stop reason: HOSPADM

## 2019-08-27 RX ADMIN — ACETAMINOPHEN 650 MG: 325 TABLET ORAL at 10:48

## 2019-08-27 RX ADMIN — POTASSIUM CHLORIDE 10 MEQ: 750 TABLET, EXTENDED RELEASE ORAL at 08:50

## 2019-08-27 RX ADMIN — AMLODIPINE BESYLATE 10 MG: 10 TABLET ORAL at 08:50

## 2019-08-27 RX ADMIN — BACLOFEN 5 MG: 10 TABLET ORAL at 21:20

## 2019-08-27 RX ADMIN — PANTOPRAZOLE SODIUM 40 MG: 40 TABLET, DELAYED RELEASE ORAL at 05:48

## 2019-08-27 RX ADMIN — SENNOSIDES, DOCUSATE SODIUM 1 TABLET: 50; 8.6 TABLET, FILM COATED ORAL at 21:20

## 2019-08-27 RX ADMIN — CLOPIDOGREL BISULFATE 75 MG: 75 TABLET ORAL at 08:50

## 2019-08-27 RX ADMIN — ATORVASTATIN CALCIUM 80 MG: 80 TABLET, FILM COATED ORAL at 21:19

## 2019-08-27 RX ADMIN — ENOXAPARIN SODIUM 40 MG: 40 INJECTION SUBCUTANEOUS at 08:50

## 2019-08-27 RX ADMIN — LISINOPRIL 10 MG: 10 TABLET ORAL at 21:19

## 2019-08-27 RX ADMIN — ASPIRIN 81 MG: 81 TABLET ORAL at 08:50

## 2019-08-27 ASSESSMENT — PAIN SCALES - GENERAL
PAINLEVEL_OUTOF10: 0
PAINLEVEL_OUTOF10: 0
PAINLEVEL_OUTOF10: 2
PAINLEVEL_OUTOF10: 0

## 2019-08-27 ASSESSMENT — PAIN DESCRIPTION - PROGRESSION: CLINICAL_PROGRESSION: NOT CHANGED

## 2019-08-27 ASSESSMENT — PAIN DESCRIPTION - FREQUENCY: FREQUENCY: CONTINUOUS

## 2019-08-27 ASSESSMENT — PAIN DESCRIPTION - LOCATION: LOCATION: SHOULDER

## 2019-08-27 ASSESSMENT — PAIN DESCRIPTION - ORIENTATION: ORIENTATION: LEFT

## 2019-08-27 ASSESSMENT — PAIN DESCRIPTION - DESCRIPTORS: DESCRIPTORS: ACHING

## 2019-08-27 ASSESSMENT — PAIN DESCRIPTION - ONSET: ONSET: ON-GOING

## 2019-08-27 ASSESSMENT — PAIN DESCRIPTION - PAIN TYPE: TYPE: ACUTE PAIN

## 2019-08-27 NOTE — PROGRESS NOTES
Occupational Therapy  Facility/Department: [address] REHAB  Daily Treatment Note  NAME: Ruth Zelaya  : 1970  MRN: [id number]    Date of Service: 2019    Discharge Recommendations:  Home with assist PRN, Home with Home health OT  OT Equipment Recommendations  Other: TBD--pt's mother owns a TTB, RTS, BSC; pt may need q cane    Assessment   Performance deficits / Impairments: Decreased functional mobility ; Decreased cognition;Decreased ADL status; Decreased ROM; Decreased strength;Decreased balance;Decreased sensation;Decreased coordination;Decreased fine motor control;Decreased high-level IADLs  Assessment: pt showing trace movmts for L shld elevation, scapular retraction, wrist ext  w/ muscle taping & vibration. Had trace digit 2 flex & ext x 1. OT used gentle shaking, vibration and warm compress to reduce tone & pain to L shld. Performed table washes on rolled towel x 10 in each direction. Minimal c/o pain w/ movmt. Pt to ask  [facility] for pain patch. Cont w/ POC  Treatment Diagnosis: Impaired ADLs and functional mobility w/ L hemiparesis and L neglect   Prognosis: Good  History: PMH includes: HTN. Exam: ADLs & functional mobility  Assistance / Modification: min A w/ t/f using q cane, min A w/ UB & LB ADLs  Patient Education: purpose of using vibration & heat to reduce tone & pain L shld  REQUIRES OT FOLLOW UP: Yes  Activity Tolerance  Activity Tolerance: Patient Tolerated treatment well  Activity Tolerance: painful left shoulder, but tolerated session--encouraged pt to ask about Lidaderm patch for L shld pain       PM session: met in therapy dept, pt's mother present. Completed DRY t/f to RTS and TTB (see bold section below)--pt will have access to using her mother's TTB and RTS at home; suggested using BSC only for night use to reduce fall risk while ambulating to bathrm--her mother's bathrm is handicap accessible w/ GBs in tub/shower area.  Pt also participated in vision assmts including vision stand: Stand by assistance  Stand to sit: Contact guard assistance  Transfer Comments: close SB/CGA during sit<>stand transitions, used q cane for household t/f                    Vision--completed during PM session  Occulomotor: Scanning;Tracking; Saccades;Education(eye chart: R--20/20, L--20/25; oculomotor control is sluggish thru L eye; saccades unsteady L eye control but improved after several attempts)  Visual Field Cut Comment: (no visual field cut, has 100* peripheral vision B'ly)  Vision Comment: mild L neglect noted; pt's daughter had to hold pt's head steady to avoid turning her head during ocular assessment    Cognition  Problem Solving: Assistance required to implement solutions;Assistance required to generate solutions  Initiation: Requires cues for some  Sequencing: Requires cues for some  Cognition Comment: speech is rapid but intelligible, improving w/ safety awareness, alerting OT when ready to stand     Perception  Overall Perceptual Status: Impaired  Unilateral Attention: Cues to attend to left side of body;Cues to attend left visual field              Type of ROM/Therapeutic Exercise  Comment: Completed active elbow flexion on table top with towel placed underneath. Pt was able to complete AAROM to L UE at the table surface.   Completed PROM left wrist. and hand  Exercises  Scapular Protraction: x20 with tapping for muscle activation  Scapular Retraction: x20 with tapping for muscle activation  Shoulder Depression: x20 with mild compensation to raise L side  Shoulder Elevation: x20 with mild compensation to raise L side  Horizontal ABduction: 10  Horizontal ADduction: 10  Elbow Flexion: x20 AAROM   Elbow Extension: x20 AAROM   Wrist Extension: has trace wrist extension w/ muscle tapping  Grasp/Release: trace digit 2 flex & extension  Other: reports pain L anterior shld w/ abduction, OT used vibrator to reduce tone @ L shld & pect site; applied warm packet on L shld to reduce pain & tone during ADL participation  Long term goal 8: Pt will complete w/c mobility for 150 feet and household t/f with SBA   Patient Goals   Patient goals :  \"To get better, to get this back (referring to L UE)\"        Therapy Time   Individual Concurrent Group PM treatment   Time In 1115      1330   Time Out 1200      1415   Minutes 45      45    Timed Code Treatment Minutes: [address], [place] #[id number]

## 2019-08-27 NOTE — PATIENT CARE CONFERENCE
.    SPEECH THERAPY (intentionally left blank if not actively being seen by this service):  FIMS:  Comprehension: 6 - Complex ideas 90% or device (hearing aid or glasses- if patient is primarily a visual learner)  Expression: 7 - Patient expresses complex ideas/needs  Social Interaction: 6 - Patient requires medication for mood and/or effect  Problem Solvin - Patient able to solve simple/routine tasks  Memory: 6 - Patient requires device to recall (e.g. memory book)    Assessment: Pt has met oral motor speech goals and dysphagia goals. Pt is demonstrating improved recall of new learning safety strategies and ex. Pt is demonstrating complex processing with intermittent mild assist regarding new learning strategies. Pt with residual left facial asym; but has improving sensation and improving volitional rom. Plan to d/c SLP treatment at this time;unless otherwise recommended by staff/MD. Pt/family ed in ex completed. Jessa Gilbert,MS,CCC,SLP [id number]  Speech and Language Pathologist          OCCUPATIONAL THERAPY    ADL  Equipment Provided: Reacher, Long-handled sponge  Feeding: Setup(set up to open containers, cut food)  Grooming: Minimal assistance(extra time to manage containers, OT placed L UE onto sink while pt applied deodorant, set up to wash face)  UE Bathing: Verbal cueing, Setup(while seated on shower chair, VCs to recall federico tech using LH sponge, shown how to position L UE onto GB for briefly washing & rinsing armpit)  LE Bathing: Contact guard assistance(while seated on shower chair, used LH sponge to wash thighs<>feet; stood w/ CGA to wash & rinse dorothy areas; OT positioned L hand to GB)  UE Dressing: Minimal assistance(struggled to get L UE into sleeve, gave touch A to place into sleeve; gave mod VCs to recall federico tech)  LE Dressing: Minimal assistance(able to doff socks using reacher, donned underwear & pants using reacher; stood w/ CGA while managing clothes over hips, min VCs to attend to L hip;

## 2019-08-27 NOTE — PROGRESS NOTES
Admitted to rehab due to a stroke. Patient alert and oriented x 4 . VSS. Patient's last bowel movement noted on August , 25 , 2019. Patient transfers x 1 with cane. Patient aware of room and surroundings. Morning assessment complete. Patient educated on use of call light. Medication administered this morning with no complications. Patient current pain level status: 2/10, Interventions made as necessary: tylenol administered per orders, see mar. The use of rest and other techniques specific to this patient are used as a means of non-pharmacological interventions for pain. Continuing education specific to the this patient is continue throughout stay. Bed/Chair alarm on  and call light within reach. Input and output being monitored along with daily weights. Patient has no further needs at this time. RN will continue to monitor.

## 2019-08-27 NOTE — PROGRESS NOTES
Admitted to rehab due to a stroke. Alert and oriented x 4. Left arm flaccid, left leg weak. Transfers with quad cane. Toileting with CGA and supervision. Family member spending the night who has been checked off to transfer the patient. Continent of bladder no BM as of yet this shift but she states she is passing flatus. Denies pain, but does use ice pack to left shoulder soreness. Call light with in reach.  Jaswinder Prince RN

## 2019-08-27 NOTE — PROGRESS NOTES
Speech Language Pathology  ACUTE REHAB UNIT  SPEECH/LANGUAGE PATHOLOGY      [x] Daily  [] Weekly Care Conference Note  [] Discharge    Patient: Blossom Galan      :1970  FUB:[id number]  Rehab Dx/Hx: Right sided cerebral hemisphere cerebrovascular accident (CVA) ([facility] [address] [I63.9]    Precautions: FAlls; Dysphagia complaints  Home situation: Lives with grown son   Dx: [] Aphasia  [] Dysarthria  [] Apraxia   [x] Oropharyngeal dysphagia [x] Cognitive   Impairment  [] Other:   Initial Speech Therapy Assessment Diagnosis:   1. Cognitive Diagnosis: [place] cogntion intact except for mild deficits in working memory and complex processing. Further assessment in complex cognitive linuistic skills for executive function. 2. Speech Diagnosis: Pt with mild dysarthria characterized by left sided weakess resulting in mild phonemic distortions and syllable reductions. Pt has a rapid rate of speech. Pt's speech is adible and >90% intellgibile. 3. Communication Diagnosis: Pt with deficits in complex processing. This may be pre-morbid. Ongoing assessment warranted. 4. Dysphagia Diagnosis: Mild to moderate Oropharyngeal Dysphagia characterized by prolonged mastication using only the right side, reduced bolus control for A-P oral transit, potential delayed trigger of the swallow, potential reduced laryngeal elevation. Symptoms: Complaints of biting left cheek and left side of tongue; Pocketing of food in left buccal cavity; Minimal oral residue cleared with liquid wash; No clinical s/s of penetration aspiration; Pt with one complaint that something was \"stuck\", but was able to swallow it successfully with a liquid wash. Pt states she is unsure if swallowing difficulty is \"just because I am scared to swallow\". Date of Admit: 2019  Room #: M2N-2494/3270-01  Date: 2019       Current functional status (updated daily):         Current Diet Order:DIET CARDIAC;   Dietary Nutrition Supplements: Standard High Calorie Oral Supplement   Communication: []WFL  [] Aphasia  [x] Dysarthria  [] Apraxia  [] Pragmatic Impairment [] Non-verbal  [] Hearing Loss  [x] Other: Most optimal for concrete  Cognition: [] WFL  [x] Mild  [] Moderate  [] Severe [] Unable to Assess  [] Other:  Memory: [x] WFL  [x] Mild  [] Moderate  [] Severe [] Unable to Assess  [] Other:  Behavior: [x] Alert  [x] Cooperative  [x]  Pleasant  [] Confused  [] Agitated  [] Uncooperative  [] Distractible [] Motivated  [] Self-Limiting [] Anxious  [x] Other: self reports anxious/nervous at times; can be impulsive  Endurance:  [x] Adequate for participation in SLP sessions  [] Reduced overall  [] Lethargic  [] Other:  Safety: [x] No concerns at this time  [] Reduced insight into deficits  []  Reduced safety awareness [] Not following call light procedures  [] Unable to Assess  [] Other:  Swallowing Precautions: Sit up for all meals and thereafter for 30 minutes, Eat with small bites (1/2 tsp; 1 tsp), Dry swallow after each bite/sip, Check mouth for food residue after snacks and meals and Take your medication with apple sauce  Barriers toward progress: caregiver support may be a barrier         Date: 8/27/2019      Tx session 1 Tx session 2   Total Timed Code Min SLP Individual Minutes  Time In: 0900  Time Out: 0945  Minutes: 45  Coded treatment time  20  n/a   Group Treatment Minutes 0 0   Co-Treat Minutes 0 0   Variance/Reason:  n/a n/a   Pain n/a n/a   Pain Intervention [] RN notified  [] Repositioned  [] Intervention offered and patient declined  [] N/A  [] Other: [] RN notified  [] Repositioned  [] Intervention offered and patient declined  [] N/A  [] Other:   Subjective     Pt seen in treatment office  Pt's mother was present for part of session      Objective:  Goals      Pt goal is to \"eat without fear of choking and without biting left cheek and tongue\"  Therapy working toward pt goal    Dysphagia Goals:   1.  The patient will tolerate regular consistency CesarMS,CCC,SLP 0596  Speech and Language Pathologist

## 2019-08-27 NOTE — PROGRESS NOTES
Comment  Comments: pt very particular and distracted by cleanliness of self and surroundings          Orientation  Orientation  Overall Orientation Status: Within Normal Limits  Objective   Bed mobility  Bridging: Stand by assistance  Rolling to Left: Stand by assistance  Rolling to Right: Stand by assistance  Supine to Sit: Stand by assistance  Sit to Supine: Stand by assistance  Scooting: Stand by assistance  Transfers  Sit to Stand: Stand by assistance;Contact guard assistance  Stand to sit: Stand by assistance;Contact guard assistance  Comment: with SBQC, L UE in Bobath sling  Ambulation  Ambulation?: Yes  More Ambulation?: Yes  Ambulation 1  Surface: level tile;carpet  Device: Small Syed Iman  Other Apparatus: Left(Bobath sling)  Assistance: Contact guard assistance;Stand by assistance(mostly CGA, but did have one near loss of balance requiring up to min/CGA to recover balance)  Quality of Gait: Patient with slow stephanie, decreased swing through with L LE tends to keep extended with increase WB on R side, able to clear floor, cued for heel strike- more foot flat, L LE extended through stance using extensor tone, decreased recurvatum noted on stance with L LE.  Patient c/o of fatigue and feeling shakey last 30'  Gait Deviations: Slow Stephanie;Decreased step length;Decreased step height;Decreased arm swing;Decreased head and trunk rotation  Distance: 200', figure 8 pattern on carpet, closed area  Comments: encouraged to look ahead to anticipate obstacles and deviate path accordingly, needs cues to turn towards L side  Stairs/Curb  Stairs?: Yes  Stairs  # Steps : 13(Repeated twice)  Stairs Height: 6\"  Rails: Right ascending  Curbs: 6\"  Device: Small Syed [place]  Other Apparatus: Left(bobath sling)  Assistance: Contact guard assistance  Comment: Patient CGA with rail on R side, tends to circumduct L LE upon ascending, ataxia noting descending, SBQC on curb      PM session:  Curbs: 6\"  Device: [employer] get back to walking, climbing stairs, and driving. Plan    Plan  Times per week: 5-6  Times per day: Twice a day  Specific instructions for Next Treatment: curb , balance activities, strengthening  Current Treatment Recommendations: Functional Mobility Training, Balance Training, Strengthening, ROM, Transfer Training, Endurance Training, Gait Training, Stair training, Cognitive/Perceptual Training, Neuromuscular Re-education, Home Exercise Program, Safety Education & Training, Patient/Caregiver Education & Training, Equipment Evaluation, Education, & procurement  Safety Devices  Type of devices:  All fall risk precautions in place, Gait belt, Patient at risk for falls  Restraints  Initially in place: No     Therapy Time AM:   Individual Concurrent Group Co-treatment   Time In 0945         Time Out 1030         Minutes 45         Timed Code Treatment Minutes: 45 Minutes    Therapy Time PM:   Individual Concurrent Group Co-treatment   Time In 1300         Time Out 1330         Minutes 30         Timed Code Treatment Minutes: [address], [address] [address] [place], [place] [address]

## 2019-08-27 NOTE — PROGRESS NOTES
Department of [facility] Progress Note    Patient Identification:  Justus Lobato  [id number]  : 1970  Admit date: 2019      Diagnosis:   Patient Active Problem List   Diagnosis    Acute ischemic stroke [facility])    Right sided cerebral hemisphere cerebrovascular accident (CVA) ([facility] [address]           Subjective:   Pt seen this AM. Patient was discussed yesterday in [place] [address] with entire Rehab Team, and we think patient will be ready for discharge to home in 9 days, next Wednesday on . We will have her continue with therapies after discharge. She has improved in her ability to go up and down 12 steps with contact-guard to min assist.  She is still noted to have some left neglect. Patient reports she will now return home to her mother's house at discharge, which has already been made handicap accessible, and will be safe for for her at discharge. Mother reports she will be able to assist patient at home after discharge. BP (!) 152/76 Comment: manual  Pulse 85   Temp 97.7 °F (36.5 °C) (Oral)   Resp 17   Ht 5' 7\" (1.702 m)   Wt 220 lb 0.3 oz (99.8 kg)   LMP 2019   SpO2 100%   BMI 34.46 kg/m²     Last 24 hour lab  No results found for this or any previous visit (from the past 24 hour(s)). Therapy progress:  PT  Position Activity Restriction  Other position/activity restrictions: Impulsive, mild left neglect. Cardiac diet.   Objective     Sit to Stand: Stand by assistance, Contact guard assistance  Stand to sit: Stand by assistance, Contact guard assistance  Bed to Chair: Stand by assistance, Contact guard assistance(cues to turn safely to chair)  Device: Small Syed [place]  Other Apparatus: Left(Bobath sling)  Assistance: Contact guard assistance, Stand by assistance(mostly CGA, but did have one near loss of balance requiring up to min/CGA to recover balance)  Distance: 120', figure 8 pattern on carpet, closed area  OT  PT Equipment

## 2019-08-28 PROCEDURE — 97116 GAIT TRAINING THERAPY: CPT | Performed by: PHYSICAL THERAPIST

## 2019-08-28 PROCEDURE — 97110 THERAPEUTIC EXERCISES: CPT | Performed by: PHYSICAL THERAPIST

## 2019-08-28 PROCEDURE — 97112 NEUROMUSCULAR REEDUCATION: CPT

## 2019-08-28 PROCEDURE — 97535 SELF CARE MNGMENT TRAINING: CPT

## 2019-08-28 PROCEDURE — 97530 THERAPEUTIC ACTIVITIES: CPT | Performed by: PHYSICAL THERAPIST

## 2019-08-28 PROCEDURE — 92507 TX SP LANG VOICE COMM INDIV: CPT

## 2019-08-28 PROCEDURE — 92526 ORAL FUNCTION THERAPY: CPT

## 2019-08-28 PROCEDURE — 94760 N-INVAS EAR/PLS OXIMETRY 1: CPT

## 2019-08-28 PROCEDURE — 1280000000 HC REHAB R&B

## 2019-08-28 PROCEDURE — 6370000000 HC RX 637 (ALT 250 FOR IP): Performed by: PHYSICAL MEDICINE & REHABILITATION

## 2019-08-28 PROCEDURE — 97127 HC SP THER IVNTJ W/FOCUS COG FUNCJ: CPT

## 2019-08-28 PROCEDURE — 6360000002 HC RX W HCPCS: Performed by: PHYSICAL MEDICINE & REHABILITATION

## 2019-08-28 PROCEDURE — 97530 THERAPEUTIC ACTIVITIES: CPT

## 2019-08-28 RX ADMIN — LISINOPRIL 10 MG: 10 TABLET ORAL at 20:14

## 2019-08-28 RX ADMIN — ENOXAPARIN SODIUM 40 MG: 40 INJECTION SUBCUTANEOUS at 08:57

## 2019-08-28 RX ADMIN — BACLOFEN 5 MG: 10 TABLET ORAL at 11:00

## 2019-08-28 RX ADMIN — CLOPIDOGREL BISULFATE 75 MG: 75 TABLET ORAL at 08:56

## 2019-08-28 RX ADMIN — AMLODIPINE BESYLATE 10 MG: 10 TABLET ORAL at 08:56

## 2019-08-28 RX ADMIN — PANTOPRAZOLE SODIUM 40 MG: 40 TABLET, DELAYED RELEASE ORAL at 06:09

## 2019-08-28 RX ADMIN — ACETAMINOPHEN 650 MG: 325 TABLET ORAL at 20:13

## 2019-08-28 RX ADMIN — ATORVASTATIN CALCIUM 80 MG: 80 TABLET, FILM COATED ORAL at 20:14

## 2019-08-28 RX ADMIN — POTASSIUM CHLORIDE 10 MEQ: 750 TABLET, EXTENDED RELEASE ORAL at 08:56

## 2019-08-28 RX ADMIN — ASPIRIN 81 MG: 81 TABLET ORAL at 08:56

## 2019-08-28 RX ADMIN — SENNOSIDES, DOCUSATE SODIUM 1 TABLET: 50; 8.6 TABLET, FILM COATED ORAL at 20:14

## 2019-08-28 ASSESSMENT — PAIN DESCRIPTION - ORIENTATION: ORIENTATION: LEFT

## 2019-08-28 ASSESSMENT — PAIN DESCRIPTION - PAIN TYPE: TYPE: ACUTE PAIN

## 2019-08-28 ASSESSMENT — PAIN SCALES - GENERAL
PAINLEVEL_OUTOF10: 5
PAINLEVEL_OUTOF10: 0
PAINLEVEL_OUTOF10: 2

## 2019-08-28 ASSESSMENT — PAIN DESCRIPTION - LOCATION: LOCATION: SHOULDER

## 2019-08-28 NOTE — PROGRESS NOTES
Ambulating  Equipment Used: Grab bars  Toilet Transfer: Contact guard assistance  Toilet Transfers Comments: gave close SB/CGA on/off toilet, used GB on R side  Shower Transfers  Shower - Transfer From: [employer] - Transfer Type: To and From  Shower - Transfer To: Shower seat with back  Shower - Technique: Ambulating  Shower Transfers: Minimal assistance  Shower Transfers Comments: CGA in each direction using q cane  Wheelchair Bed Transfers  Wheelchair/Bed - Technique: Ambulating  Equipment Used: Other  Level of Asssistance: Contact guard assistance  Wheelchair Transfers Comments: transfers in & out of reg chair while positioned at sink     Transfers  Sit to stand: Stand by assistance  Stand to sit: Contact guard assistance  Transfer Comments: close SB/CGA during sit<>stand transitions, used q cane for household t/f                    Vision  Occulomotor: Scanning;Tracking; Saccades;Education(eye chart: R--20/20, L--20/25; oculomotor control is sluggish thru L eye; saccades unsteady L eye control but improved after several attempts)  Visual Field Cut Comment: (no visual field cut, has 100* peripheral vision B'ly)  Vision Comment: mild L neglect noted; pt's daughter had to hold pt's head steady to avoid turning her head during ocular assessment  Cognition  Problem Solving: Assistance required to implement solutions;Assistance required to generate solutions  Initiation: Requires cues for some  Sequencing: Requires cues for some  Cognition Comment: speech is rapid but intelligible, improving w/ safety awareness, alerting OT when ready to stand                                         Plan   Plan  Times per week: 5 - 7  Times per day: Twice a day  Plan weeks: approved thru WED 9/4/19--pl[facility]  Specific instructions for Next Treatment: supine exercises, wt bearing  Current Treatment Recommendations: Strengthening, ROM, Balance Training, Functional Mobility Training, Endurance

## 2019-08-28 NOTE — PROGRESS NOTES
Speech Language Pathology  ACUTE REHAB UNIT  SPEECH/LANGUAGE PATHOLOGY      [x] Daily  [x] Weekly Care Conference Note  [x] Discharge    Patient: Марина Robins      :1970  QCH:[id number]  Rehab Dx/Hx: Right sided cerebral hemisphere cerebrovascular accident (CVA) ([facility] [address] [I63.9]    Precautions: FAlls; Dysphagia complaints  Home situation: Lives with grown son   Dx: [] Aphasia  [] Dysarthria  [] Apraxia   [x] Oropharyngeal dysphagia [x] Cognitive   Impairment  [] Other:   Initial Speech Therapy Assessment Diagnosis:   1. Cognitive Diagnosis: [place] cogntion intact except for mild deficits in working memory and complex processing. Further assessment in complex cognitive linuistic skills for executive function. 2. Speech Diagnosis: Pt with mild dysarthria characterized by left sided weakess resulting in mild phonemic distortions and syllable reductions. Pt has a rapid rate of speech. Pt's speech is adible and >90% intellgibile. 3. Communication Diagnosis: Pt with deficits in complex processing. This may be pre-morbid. Ongoing assessment warranted. 4. Dysphagia Diagnosis: Mild to moderate Oropharyngeal Dysphagia characterized by prolonged mastication using only the right side, reduced bolus control for A-P oral transit, potential delayed trigger of the swallow, potential reduced laryngeal elevation. Symptoms: Complaints of biting left cheek and left side of tongue; Pocketing of food in left buccal cavity; Minimal oral residue cleared with liquid wash; No clinical s/s of penetration aspiration; Pt with one complaint that something was \"stuck\", but was able to swallow it successfully with a liquid wash. Pt states she is unsure if swallowing difficulty is \"just because I am scared to swallow\". Date of Admit: 2019  Room #: A7T-1115/3270-01  Date: 2019       Current functional status (updated daily):         Current Diet Order:DIET CARDIAC;   Dietary Nutrition Supplements: Standard High Calorie Oral Supplement   Communication: []WFL  [] Aphasia  [x] Dysarthria  [] Apraxia  [] Pragmatic Impairment [] Non-verbal  [] Hearing Loss  [x] Other: extra time/use of compensatory strategies for complex  Cognition: [x] WFL  [] Mild  [] Moderate  [] Severe [] Unable to Assess  [x] Other:Use of compensatory strategies PRN  Memory: [x] WFL  [] Mild  [] Moderate  [] Severe [] Unable to Assess  [x] Other: Use of compensatory strategies PRN  Behavior: [x] Alert  [x] Cooperative  [x]  Pleasant  [] Confused  [] Agitated  [] Uncooperative  [] Distractible [] Motivated  [] Self-Limiting [] Anxious  [x] Other: self reports anxious/nervous at times; can be impulsive  Endurance:  [x] Adequate for participation in SLP sessions  [] Reduced overall  [] Lethargic  [] Other:  Safety: [x] No concerns at this time  [] Reduced insight into deficits  []  Reduced safety awareness [] Not following call light procedures  [] Unable to Assess  [] Other:  Swallowing Precautions: Sit up for all meals and thereafter for 30 minutes, Eat with small bites (1/2 tsp; 1 tsp), Dry swallow after each bite/sip, Check mouth for food residue after snacks and meals and Take your medication with apple sauce  Barriers toward progress: caregiver support may be a barrier         Date: 8/28/2019      Tx session 1 Tx session 2   Total Timed Code Min SLP Individual Minutes  Time In: 1300  Time Out: 6387  Minutes: 39  Coded treatment time  20  n/a   Group Treatment Minutes 0 0   Co-Treat Minutes 0 0   Variance/Reason:  n/a n/a   Pain n/a n/a   Pain Intervention [] RN notified  [] Repositioned  [] Intervention offered and patient declined  [] N/A  [] Other: [] RN notified  [] Repositioned  [] Intervention offered and patient declined  [] N/A  [] Other:   Subjective     Pt seen bedside and in treatment office  Pt's mother was present for part of session      Objective:  Goals      Pt goal is to \"eat without fear of choking and without biting left cheek and

## 2019-08-28 NOTE — PROGRESS NOTES
Admitted to rehab SP stroke. Alert and oriented x 4. Transfers well with quad cane and standby assist. Mom spending the night and has previously beenchecked off and can safely transfer the patient. Continent of bladder and bowel. Left shoulder discomfort relieved with Lidoderm patch. Oral medications taken with thin liquid without difficulty. Call light with in reach and bed exit alarm activated.  Anuradha Bauer RN

## 2019-08-29 LAB
ANION GAP SERPL CALCULATED.3IONS-SCNC: 12 MMOL/L (ref 3–16)
BASOPHILS ABSOLUTE: 0 K/UL (ref 0–0.2)
BASOPHILS RELATIVE PERCENT: 0.9 %
BUN BLDV-MCNC: 14 MG/DL (ref 7–20)
CALCIUM SERPL-MCNC: 8.9 MG/DL (ref 8.3–10.6)
CHLORIDE BLD-SCNC: 103 MMOL/L (ref 99–110)
CO2: 24 MMOL/L (ref 21–32)
CREAT SERPL-MCNC: 0.6 MG/DL (ref 0.6–1.1)
EOSINOPHILS ABSOLUTE: 0.1 K/UL (ref 0–0.6)
EOSINOPHILS RELATIVE PERCENT: 3.4 %
GFR AFRICAN AMERICAN: >60
GFR NON-AFRICAN AMERICAN: >60
GLUCOSE BLD-MCNC: 112 MG/DL (ref 70–99)
HCT VFR BLD CALC: 30 % (ref 36–48)
HEMOGLOBIN: 9.9 G/DL (ref 12–16)
LYMPHOCYTES ABSOLUTE: 0.8 K/UL (ref 1–5.1)
LYMPHOCYTES RELATIVE PERCENT: 19.4 %
MCH RBC QN AUTO: 26.4 PG (ref 26–34)
MCHC RBC AUTO-ENTMCNC: 33.2 G/DL (ref 31–36)
MCV RBC AUTO: 79.6 FL (ref 80–100)
MONOCYTES ABSOLUTE: 0.3 K/UL (ref 0–1.3)
MONOCYTES RELATIVE PERCENT: 6.9 %
NEUTROPHILS ABSOLUTE: 3 K/UL (ref 1.7–7.7)
NEUTROPHILS RELATIVE PERCENT: 69.4 %
PDW BLD-RTO: 17.7 % (ref 12.4–15.4)
PLATELET # BLD: 229 K/UL (ref 135–450)
PMV BLD AUTO: 8.8 FL (ref 5–10.5)
POTASSIUM REFLEX MAGNESIUM: 4 MMOL/L (ref 3.5–5.1)
RBC # BLD: 3.77 M/UL (ref 4–5.2)
SODIUM BLD-SCNC: 139 MMOL/L (ref 136–145)
WBC # BLD: 4.3 K/UL (ref 4–11)

## 2019-08-29 PROCEDURE — 97116 GAIT TRAINING THERAPY: CPT | Performed by: PHYSICAL THERAPIST

## 2019-08-29 PROCEDURE — 6370000000 HC RX 637 (ALT 250 FOR IP): Performed by: PHYSICAL MEDICINE & REHABILITATION

## 2019-08-29 PROCEDURE — 85025 COMPLETE CBC W/AUTO DIFF WBC: CPT

## 2019-08-29 PROCEDURE — 36415 COLL VENOUS BLD VENIPUNCTURE: CPT

## 2019-08-29 PROCEDURE — 97110 THERAPEUTIC EXERCISES: CPT

## 2019-08-29 PROCEDURE — 1280000000 HC REHAB R&B

## 2019-08-29 PROCEDURE — 97530 THERAPEUTIC ACTIVITIES: CPT

## 2019-08-29 PROCEDURE — 97535 SELF CARE MNGMENT TRAINING: CPT

## 2019-08-29 PROCEDURE — 6360000002 HC RX W HCPCS: Performed by: PHYSICAL MEDICINE & REHABILITATION

## 2019-08-29 PROCEDURE — 80048 BASIC METABOLIC PNL TOTAL CA: CPT

## 2019-08-29 PROCEDURE — 97112 NEUROMUSCULAR REEDUCATION: CPT

## 2019-08-29 PROCEDURE — 97530 THERAPEUTIC ACTIVITIES: CPT | Performed by: PHYSICAL THERAPIST

## 2019-08-29 PROCEDURE — 97110 THERAPEUTIC EXERCISES: CPT | Performed by: PHYSICAL THERAPIST

## 2019-08-29 RX ADMIN — SENNOSIDES, DOCUSATE SODIUM 1 TABLET: 50; 8.6 TABLET, FILM COATED ORAL at 21:10

## 2019-08-29 RX ADMIN — CLOPIDOGREL BISULFATE 75 MG: 75 TABLET ORAL at 09:04

## 2019-08-29 RX ADMIN — POTASSIUM CHLORIDE 10 MEQ: 750 TABLET, EXTENDED RELEASE ORAL at 09:10

## 2019-08-29 RX ADMIN — ASPIRIN 81 MG: 81 TABLET ORAL at 09:04

## 2019-08-29 RX ADMIN — LISINOPRIL 10 MG: 10 TABLET ORAL at 21:10

## 2019-08-29 RX ADMIN — BACLOFEN 5 MG: 10 TABLET ORAL at 21:10

## 2019-08-29 RX ADMIN — AMLODIPINE BESYLATE 10 MG: 10 TABLET ORAL at 09:04

## 2019-08-29 RX ADMIN — PANTOPRAZOLE SODIUM 40 MG: 40 TABLET, DELAYED RELEASE ORAL at 05:36

## 2019-08-29 RX ADMIN — ATORVASTATIN CALCIUM 80 MG: 80 TABLET, FILM COATED ORAL at 21:10

## 2019-08-29 RX ADMIN — ENOXAPARIN SODIUM 40 MG: 40 INJECTION SUBCUTANEOUS at 09:05

## 2019-08-29 ASSESSMENT — PAIN SCALES - GENERAL
PAINLEVEL_OUTOF10: 0
PAINLEVEL_OUTOF10: 0

## 2019-08-29 NOTE — PROGRESS NOTES
during sit<>stand transitions, used q cane for household t/f                    Vision  Occulomotor: Scanning;Tracking; Saccades;Education(eye chart: R--20/20, L--20/25; oculomotor control is sluggish thru L eye; saccades unsteady L eye control but improved after several attempts)  Visual Field Cut Comment: (no visual field cut, has 100* peripheral vision B'ly)  Vision Comment: mild L neglect noted; pt's daughter had to hold pt's head steady to avoid turning her head during ocular assessment  Cognition  Problem Solving: Assistance required to implement solutions;Assistance required to generate solutions  Initiation: Requires cues for some  Sequencing: Requires cues for some  Cognition Comment: speech is rapid but intelligible, improving w/ safety awareness, alerting OT when ready to stand                                         Plan   Plan  Times per week: 5 - 7  Times per day: Twice a day  Plan weeks: approved thru WED 9/4/19--plans to DC to her mother's home w/ [facility] services  Specific instructions for Next Treatment: supine exercises, wt bearing, car t/f  Current Treatment Recommendations: Strengthening, ROM, Balance Training, Functional Mobility Training, Endurance Training, Neuromuscular Re-education, Equipment Evaluation, Education, & procurement, Self-Care / ADL, Patient/Caregiver Education & Training, Safety Education & Training, Positioning  Plan Comment: MON conf       Goals  Short term goals  Time Frame for Short term goals:  In 1 - 2 weeks,   Short term goal 1: Pt will complete feeding with setup of containers  --goal met 8/26/19  Short term goal 2: Pt will complete grooming seated with min A --goal met 8/26/19  Short term goal 3: Pt will complete bathing with min A seated on BSC  Goal Met  Short term goal 4: Pt will complete UE dressing with min A and use of federico techniques --goal met 8/26/19  Short term goal 5: Pt will complete LE dressing with min A and use of A/E--goal met 8/26/19  Short term goal 6: Pt will complete toileting with min A --goal met  Short term goal 7: Pt will complete household t/f with min A (anticipate start with stand pivot t/f using least restrictive AD)--goal met 8/26/19  Long term goals  Time Frame for Long term goals : In 3 - 4 weeks,  Long term goal 1: Pt will complete feeding with mod I for extra time to open containers  Long term goal 2: Pt will complete grooming seated with mod I for extra time using federico-technique   Long term goal 3: Pt will complete bathing with close SBA and setup seated on BSC  Long term goal 4: Pt will complete UE dressing with mod I for extra time   Long term goal 5: Pt will complete LE dressing & toileting with SBA and use of A/E  Long term goals 6: Pt will tolerate standing for 8 - 10 minutes with SBA during ADL participation --goal met 8/29/19  Long term goal 7: Pt will attend to L hemibody with min VCs during ADL participation--goal met  Long term goal 8: Pt will complete w/c mobility for 150 feet and household t/f with SBA --goal met using q cane  Patient Goals   Patient goals :  \"To get better, to get this back (referring to L UE)\"        Therapy Time   Individual Concurrent Group PM-treatment   Time In 1040      1515   Time Out 8202      4271   Minutes 40      60   Timed Code Treatment Minutes: [address], [place] [address]

## 2019-08-29 NOTE — PROGRESS NOTES
Safety;Goals  Barriers to Learning: slightly impulsive; some L sided neglect  REQUIRES PT FOLLOW UP: Yes  Activity Tolerance  Activity Tolerance: Patient limited by fatigue;Patient limited by endurance; Patient Tolerated treatment well  Activity Tolerance: Pt able to participate in therapy with fewer and shorter rest breaks. Patient Diagnosis(es): There were no encounter diagnoses. has a past medical history of Hypertension. has a past surgical history that includes  section. Restrictions  Restrictions/Precautions  Restrictions/Precautions: Fall Risk  Position Activity Restriction  Other position/activity restrictions: Impulsive, mild left neglect. Cardiac diet. Subjective   General  Chart Reviewed: Yes  Additional Pertinent Hx: Per Dr. Kathrin Laird, , \"51 y.o. female who presented with facial weakness and progressed to having left face and arm weakness in setting of hypertension overnight. CTA with bilateral soft plaque at bifurcation. Exam with left face and arm weakness much more than leg and right gaze preference suggesting of cortical right MCA stroke. Patient found to have thrombotic right MCA stroke on MRI in deep right corona radiata area. Patient started on DAPT with aspirin and Plavix, blood pressure medication, and patient was started in therapies. Patient is well below her baseline function of being Ind without an AD and working full time; pt currently is needing assist of 1-2 for mobility tasks and needing pedrito stedy lift for transfers with nursing and A of 2 for short distance gait with hemiwalker. Patient needs more therapy before discharge to home, where she lives with her son in a 2nd floor apartment. Patient was approved by her insurance for rehab unit admission today. She is agreeable to Rehab Unit treatment\"  Response To Previous Treatment: Patient with no complaints from previous session.   Family / Caregiver Present: Yes(daughter)  Referring Practitioner:  exercises 2: terminal knee extension x 30 reps with yellow theraband       PM Session:   S/Pt reports no increase in discomfort from morning session and ready for therapy. O/ Pt completed flight (12) 6\" steps x 2 with handrail on R and CGA. Pt demonstrated good balance and control throughout. Pt ambulated on outdoor surfaces 120' with SBQC and close SBA. Pt demonstrated good L foot clearance throughout. Pt ambulated on outdoor ramp 20' x [facility] with [facility] and C[facility]. Pt completed indoor obstacle course requiring R/L turns and step over 4\" objects using SBQC and CGA. Pt completed 4 min standing balance with balloon toss with CGA. Patient transported via w/c to activity room to paint small bird houses with volunteer. Goals  Short term goals  Time Frame for Short term goals: In 7-10 days pt will perform  Short term goal 1: Bed mobility Supervision -   Short term goal 2: Transfers CGA-SBA - met 8/17  Short term goal 3: Ambulation 48' with LRAD, CGA - met 8/17  Short term goal 4: Ascend/Descend 12 steps with Min-CGA - met 8/23  Short term goal 5: Ascend/Descend curb step with Min A -met 8/23  Long term goals  Time Frame for Long term goals : In 14-21 days pt will perform  Long term goal 1: Bed Mobility independently  Long term goal 2: Transfers Mod I  Long term goal 3: Ambulation 80' with LRAD, Mod I  Long term goal 4: Ascend/Descend 12 steps with supervision  Long term goal 5: Ascend/Descend curb step with SBA  Patient Goals   Patient goals : To improve her arm strength, and leg strength, and to get back to walking, climbing stairs, and driving.      Plan    Plan  Times per week: 5-6  Times per day: Twice a day  Specific instructions for Next Treatment: steps, ambulation for distance  Current Treatment Recommendations: Functional Mobility Training, Balance Training, Strengthening, ROM, Transfer Training, Endurance Training, Gait Training, Stair training, Cognitive/Perceptual Training, Neuromuscular Re-education, Home

## 2019-08-30 PROCEDURE — 97112 NEUROMUSCULAR REEDUCATION: CPT | Performed by: PHYSICAL THERAPIST

## 2019-08-30 PROCEDURE — 6360000002 HC RX W HCPCS: Performed by: PHYSICAL MEDICINE & REHABILITATION

## 2019-08-30 PROCEDURE — 97530 THERAPEUTIC ACTIVITIES: CPT | Performed by: PHYSICAL THERAPIST

## 2019-08-30 PROCEDURE — 1280000000 HC REHAB R&B

## 2019-08-30 PROCEDURE — 97535 SELF CARE MNGMENT TRAINING: CPT

## 2019-08-30 PROCEDURE — 97112 NEUROMUSCULAR REEDUCATION: CPT

## 2019-08-30 PROCEDURE — 6370000000 HC RX 637 (ALT 250 FOR IP): Performed by: PHYSICAL MEDICINE & REHABILITATION

## 2019-08-30 PROCEDURE — 97116 GAIT TRAINING THERAPY: CPT | Performed by: PHYSICAL THERAPIST

## 2019-08-30 PROCEDURE — 97530 THERAPEUTIC ACTIVITIES: CPT

## 2019-08-30 PROCEDURE — 94760 N-INVAS EAR/PLS OXIMETRY 1: CPT

## 2019-08-30 RX ADMIN — BACLOFEN 5 MG: 10 TABLET ORAL at 21:09

## 2019-08-30 RX ADMIN — AMLODIPINE BESYLATE 10 MG: 10 TABLET ORAL at 09:24

## 2019-08-30 RX ADMIN — ENOXAPARIN SODIUM 40 MG: 40 INJECTION SUBCUTANEOUS at 09:24

## 2019-08-30 RX ADMIN — SENNOSIDES, DOCUSATE SODIUM 1 TABLET: 50; 8.6 TABLET, FILM COATED ORAL at 21:09

## 2019-08-30 RX ADMIN — ASPIRIN 81 MG: 81 TABLET ORAL at 09:23

## 2019-08-30 RX ADMIN — POTASSIUM CHLORIDE 10 MEQ: 750 TABLET, EXTENDED RELEASE ORAL at 09:24

## 2019-08-30 RX ADMIN — CLOPIDOGREL BISULFATE 75 MG: 75 TABLET ORAL at 09:23

## 2019-08-30 RX ADMIN — ATORVASTATIN CALCIUM 80 MG: 80 TABLET, FILM COATED ORAL at 21:10

## 2019-08-30 RX ADMIN — LISINOPRIL 10 MG: 10 TABLET ORAL at 21:10

## 2019-08-30 RX ADMIN — PANTOPRAZOLE SODIUM 40 MG: 40 TABLET, DELAYED RELEASE ORAL at 04:49

## 2019-08-30 ASSESSMENT — PAIN SCALES - GENERAL
PAINLEVEL_OUTOF10: 0
PAINLEVEL_OUTOF10: 0

## 2019-08-30 NOTE — PLAN OF CARE
Problem: Risk for Impaired Skin Integrity  Goal: Tissue integrity - skin and mucous membranes  Description  Structural intactness and normal physiological function of skin and  mucous membranes. Outcome: Ongoing  Note:   Able to change positions in bed without assist, no evidence of skin breakdown noted. Waffle cushion in place to chair. Problem: Falls - Risk of:  Goal: Will remain free from falls  Description  Will remain free from falls  Outcome: Ongoing  Note:   Pt educated on falls precautions and safety. Call light and personal belongings within reach at all times. Non skid socks in use when up. Hourly rounding and alarms active. Problem: Pain:  Goal: Pain level will decrease  Description  Pain level will decrease  Outcome: Ongoing  Note:   Able to rate pain using a 1-10 scale, medicated per prn orders, see MAR.  Able to verbalize a reduction in pain and/or able to fall asleep and remain asleep without any s/s of pain

## 2019-08-30 NOTE — PROGRESS NOTES
Cognitive/Perceptual Training, Neuromuscular Re-education, Home Exercise Program, Safety Education & Training, Patient/Caregiver Education & Training, Equipment Evaluation, Education, & procurement  Plan Comment: D/C Wednesday , 9/4  Safety Devices  Type of devices: All fall risk precautions in place, Gait belt, Patient at risk for falls(Pt transitioned to [facility])  Restraints  Initially in place: No     Therapy Time   Individual Concurrent Group Co-treatment   Time In 0945         Time Out 1030         Minutes 45         Timed Code Treatment Minutes: 45 Minutes     Therapy Time   Individual Concurrent Group Co-treatment   Time In 1300         Time Out 1345         Minutes 45         Timed Code Treatment Minutes: 39 Minutes  SU [place]  Therapist was present, directed the patient's care, made skilled judgement, and was responsible for assessment and treatment of the patient.         Lorice Fothergill, PT # [id number]

## 2019-08-30 NOTE — PROGRESS NOTES
GB on R side, tends to sit slightly off center on toilet  Shower Transfers  Shower - Transfer From: [employer] - Transfer Type: To and From  Shower - Transfer To: Shower seat with back  Shower - Technique: Ambulating  Shower Transfers: Contact Guard  Shower Transfers Comments: close SB w/ occasional CG using  cane & GB, places L hand on bar gross grasp  Wheelchair Bed Transfers  Wheelchair/Bed - Technique: Ambulating  Equipment Used: Other  Level of Asssistance: Stand by assistance  Wheelchair Transfers Comments: transfers in & out of reg chair, OT guided L hand to armrest & GB during stand<sit     Transfers  Sit to stand: Stand by assistance  Stand to sit: Stand by assistance  Transfer Comments: close SBA during sit<>stand transitions, used q cane for household t/f                    Vision  Occulomotor: Scanning;Tracking; Saccades;Education(eye chart: R--20/20, L--20/25; oculomotor control is sluggish thru L eye; saccades unsteady L eye control but improved after several attempts)  Visual Field Cut Comment: (no visual field cut, has 100* peripheral vision B'ly)  Vision Comment: mild L neglect noted; pt's daughter had to hold pt's head steady to avoid turning her head during ocular assessment                                Car Transfers--completed during PM session  Car - Transfer From: [employer] - Technique: Ambulating  Car Transfers: Stand by assistance;Verbal cues  Car Transfers: Pt completed car t/f w/ SBA using q cane, needed min VCs to  recall safe hand placement and management of L federico body throughout.             Plan   Plan  Times per week: 5 - 7  Times per day: Twice a day  Plan weeks: approved thru WED 9/4/19--plans to [facility]  Specific instructions for Next Treatment: yoga ball for trunk stability/strengthening  Current Treatment Recommendations: Strengthening, ROM, Balance Training, Functional Mobility Training, Endurance Training, Neuromuscular Re-education, Equipment Evaluation, Education, & procurement, Self-Care / ADL, Patient/Caregiver Education & Training, Safety Education & Training, Positioning  Plan Comment: MON conf    Goals  Short term goals  Time Frame for Short term goals: In 1 - 2 weeks,   Short term goal 1: Pt will complete feeding with setup of containers  --goal met 8/26/19  Short term goal 2: Pt will complete grooming seated with min A --goal met 8/26/19  Short term goal 3: Pt will complete bathing with min A seated on BSC  Goal Met  Short term goal 4: Pt will complete UE dressing with min A and use of federico techniques --goal met 8/26/19  Short term goal 5: Pt will complete LE dressing with min A and use of A/E--goal met 8/26/19  Short term goal 6: Pt will complete toileting with min A --goal met  Short term goal 7: Pt will complete household t/f with min A (anticipate start with stand pivot t/f using least restrictive AD)--goal met 8/26/19  Long term goals  Time Frame for Long term goals : In 3 - 4 weeks,  Long term goal 1: Pt will complete feeding with mod I for extra time to open containers  Long term goal 2: Pt will complete grooming seated with mod I for extra time using federico-technique --goal met  Long term goal 3: Pt will complete bathing with close SBA and setup seated on BSC--goal met  Long term goal 4: Pt will complete UE dressing with mod I for extra time   Long term goal 5: Pt will complete LE dressing & toileting with SBA and use of A/E--goal met  Long term goals 6: Pt will tolerate standing for 8 - 10 minutes with SBA during ADL participation --goal met 8/29/19  Long term goal 7: Pt will attend to L hemibody with min VCs during ADL participation--goal met  Long term goal 8: Pt will complete w/c mobility for 150 feet and household t/f with SBA --goal met using q cane  Patient Goals   Patient goals :  \"To get better, to get this back (referring to L UE)\"        Therapy Time   Individual Concurrent Group PM-treatment   Time In 0815      2415   Time Out

## 2019-08-30 NOTE — PROGRESS NOTES
Pt sleeping well overnight. Admitted with a CVA, left sided weakness. LUE is flaccid. Pt with some possible left shoulder movement. Reports full sensation. Transfers x1 with a quad cane. Multiple family members able to transfer the patient. Family spending the night. Lungs CTA, HR regular. Abdomen non tender with active bowel sounds, LBM 8/29. Continent of bowel and bladder. Denies pain. All needs assessed with rounding, call light in reach. Will continue to monitor.  Lanre Doll RN

## 2019-08-31 PROCEDURE — 1280000000 HC REHAB R&B

## 2019-08-31 PROCEDURE — 94760 N-INVAS EAR/PLS OXIMETRY 1: CPT

## 2019-08-31 PROCEDURE — 6360000002 HC RX W HCPCS: Performed by: PHYSICAL MEDICINE & REHABILITATION

## 2019-08-31 PROCEDURE — 6370000000 HC RX 637 (ALT 250 FOR IP): Performed by: PHYSICAL MEDICINE & REHABILITATION

## 2019-08-31 RX ADMIN — SENNOSIDES, DOCUSATE SODIUM 1 TABLET: 50; 8.6 TABLET, FILM COATED ORAL at 20:55

## 2019-08-31 RX ADMIN — PANTOPRAZOLE SODIUM 40 MG: 40 TABLET, DELAYED RELEASE ORAL at 07:02

## 2019-08-31 RX ADMIN — ASPIRIN 81 MG: 81 TABLET ORAL at 10:08

## 2019-08-31 RX ADMIN — POTASSIUM CHLORIDE 10 MEQ: 750 TABLET, EXTENDED RELEASE ORAL at 10:08

## 2019-08-31 RX ADMIN — AMLODIPINE BESYLATE 10 MG: 10 TABLET ORAL at 10:08

## 2019-08-31 RX ADMIN — CLOPIDOGREL BISULFATE 75 MG: 75 TABLET ORAL at 10:08

## 2019-08-31 RX ADMIN — ENOXAPARIN SODIUM 40 MG: 40 INJECTION SUBCUTANEOUS at 10:09

## 2019-08-31 RX ADMIN — ATORVASTATIN CALCIUM 80 MG: 80 TABLET, FILM COATED ORAL at 20:55

## 2019-08-31 RX ADMIN — BACLOFEN 5 MG: 10 TABLET ORAL at 20:58

## 2019-08-31 RX ADMIN — LISINOPRIL 10 MG: 10 TABLET ORAL at 20:54

## 2019-08-31 ASSESSMENT — PAIN - FUNCTIONAL ASSESSMENT
PAIN_FUNCTIONAL_ASSESSMENT: ACTIVITIES ARE NOT PREVENTED

## 2019-08-31 ASSESSMENT — PAIN DESCRIPTION - LOCATION
LOCATION: SHOULDER

## 2019-08-31 ASSESSMENT — PAIN DESCRIPTION - DESCRIPTORS
DESCRIPTORS: ACHING

## 2019-08-31 ASSESSMENT — PAIN DESCRIPTION - FREQUENCY
FREQUENCY: CONTINUOUS

## 2019-08-31 ASSESSMENT — PAIN SCALES - GENERAL
PAINLEVEL_OUTOF10: 2
PAINLEVEL_OUTOF10: 0
PAINLEVEL_OUTOF10: 5
PAINLEVEL_OUTOF10: 2

## 2019-08-31 ASSESSMENT — PAIN DESCRIPTION - PROGRESSION
CLINICAL_PROGRESSION: NOT CHANGED

## 2019-08-31 ASSESSMENT — PAIN DESCRIPTION - ORIENTATION
ORIENTATION: LEFT

## 2019-08-31 ASSESSMENT — PAIN DESCRIPTION - ONSET
ONSET: ON-GOING

## 2019-08-31 ASSESSMENT — PAIN DESCRIPTION - PAIN TYPE
TYPE: ACUTE PAIN

## 2019-09-01 PROCEDURE — 6360000002 HC RX W HCPCS: Performed by: PHYSICAL MEDICINE & REHABILITATION

## 2019-09-01 PROCEDURE — 1280000000 HC REHAB R&B

## 2019-09-01 PROCEDURE — 6370000000 HC RX 637 (ALT 250 FOR IP): Performed by: PHYSICAL MEDICINE & REHABILITATION

## 2019-09-01 PROCEDURE — 94760 N-INVAS EAR/PLS OXIMETRY 1: CPT

## 2019-09-01 RX ADMIN — CLOPIDOGREL BISULFATE 75 MG: 75 TABLET ORAL at 10:31

## 2019-09-01 RX ADMIN — POTASSIUM CHLORIDE 10 MEQ: 750 TABLET, EXTENDED RELEASE ORAL at 10:31

## 2019-09-01 RX ADMIN — SENNOSIDES, DOCUSATE SODIUM 1 TABLET: 50; 8.6 TABLET, FILM COATED ORAL at 22:28

## 2019-09-01 RX ADMIN — ENOXAPARIN SODIUM 40 MG: 40 INJECTION SUBCUTANEOUS at 10:32

## 2019-09-01 RX ADMIN — ACETAMINOPHEN 650 MG: 325 TABLET ORAL at 12:04

## 2019-09-01 RX ADMIN — ATORVASTATIN CALCIUM 80 MG: 80 TABLET, FILM COATED ORAL at 22:28

## 2019-09-01 RX ADMIN — BACLOFEN 5 MG: 10 TABLET ORAL at 22:28

## 2019-09-01 RX ADMIN — AMLODIPINE BESYLATE 10 MG: 10 TABLET ORAL at 10:31

## 2019-09-01 RX ADMIN — ASPIRIN 81 MG: 81 TABLET ORAL at 10:31

## 2019-09-01 RX ADMIN — LISINOPRIL 10 MG: 10 TABLET ORAL at 22:28

## 2019-09-01 RX ADMIN — PANTOPRAZOLE SODIUM 40 MG: 40 TABLET, DELAYED RELEASE ORAL at 04:43

## 2019-09-01 ASSESSMENT — PAIN DESCRIPTION - LOCATION: LOCATION: SHOULDER

## 2019-09-01 ASSESSMENT — PAIN SCALES - GENERAL
PAINLEVEL_OUTOF10: 0
PAINLEVEL_OUTOF10: 1
PAINLEVEL_OUTOF10: 0
PAINLEVEL_OUTOF10: 3
PAINLEVEL_OUTOF10: 0
PAINLEVEL_OUTOF10: 0

## 2019-09-01 ASSESSMENT — PAIN DESCRIPTION - ORIENTATION: ORIENTATION: LEFT

## 2019-09-01 ASSESSMENT — PAIN DESCRIPTION - PAIN TYPE: TYPE: ACUTE PAIN

## 2019-09-01 ASSESSMENT — PAIN DESCRIPTION - DESCRIPTORS: DESCRIPTORS: ACHING

## 2019-09-01 ASSESSMENT — PAIN DESCRIPTION - ONSET: ONSET: ON-GOING

## 2019-09-01 ASSESSMENT — PAIN DESCRIPTION - PROGRESSION
CLINICAL_PROGRESSION: NOT CHANGED

## 2019-09-01 ASSESSMENT — PAIN DESCRIPTION - FREQUENCY: FREQUENCY: CONTINUOUS

## 2019-09-02 LAB
ANION GAP SERPL CALCULATED.3IONS-SCNC: 10 MMOL/L (ref 3–16)
BASOPHILS ABSOLUTE: 0 K/UL (ref 0–0.2)
BASOPHILS RELATIVE PERCENT: 0.6 %
BUN BLDV-MCNC: 11 MG/DL (ref 7–20)
CALCIUM SERPL-MCNC: 9.1 MG/DL (ref 8.3–10.6)
CHLORIDE BLD-SCNC: 104 MMOL/L (ref 99–110)
CO2: 25 MMOL/L (ref 21–32)
CREAT SERPL-MCNC: 0.6 MG/DL (ref 0.6–1.1)
EOSINOPHILS ABSOLUTE: 0.1 K/UL (ref 0–0.6)
EOSINOPHILS RELATIVE PERCENT: 2.7 %
GFR AFRICAN AMERICAN: >60
GFR NON-AFRICAN AMERICAN: >60
GLUCOSE BLD-MCNC: 108 MG/DL (ref 70–99)
HCT VFR BLD CALC: 31 % (ref 36–48)
HEMOGLOBIN: 10.3 G/DL (ref 12–16)
LYMPHOCYTES ABSOLUTE: 0.7 K/UL (ref 1–5.1)
LYMPHOCYTES RELATIVE PERCENT: 15.7 %
MCH RBC QN AUTO: 25.6 PG (ref 26–34)
MCHC RBC AUTO-ENTMCNC: 33.3 G/DL (ref 31–36)
MCV RBC AUTO: 76.9 FL (ref 80–100)
MONOCYTES ABSOLUTE: 0.3 K/UL (ref 0–1.3)
MONOCYTES RELATIVE PERCENT: 6.4 %
NEUTROPHILS ABSOLUTE: 3.6 K/UL (ref 1.7–7.7)
NEUTROPHILS RELATIVE PERCENT: 74.6 %
PDW BLD-RTO: 18 % (ref 12.4–15.4)
PLATELET # BLD: 269 K/UL (ref 135–450)
PMV BLD AUTO: 8.7 FL (ref 5–10.5)
POTASSIUM REFLEX MAGNESIUM: 4.1 MMOL/L (ref 3.5–5.1)
RBC # BLD: 4.03 M/UL (ref 4–5.2)
SODIUM BLD-SCNC: 139 MMOL/L (ref 136–145)
WBC # BLD: 4.8 K/UL (ref 4–11)

## 2019-09-02 PROCEDURE — 97530 THERAPEUTIC ACTIVITIES: CPT | Performed by: PHYSICAL THERAPIST

## 2019-09-02 PROCEDURE — 6370000000 HC RX 637 (ALT 250 FOR IP): Performed by: PHYSICAL MEDICINE & REHABILITATION

## 2019-09-02 PROCEDURE — 97116 GAIT TRAINING THERAPY: CPT | Performed by: PHYSICAL THERAPIST

## 2019-09-02 PROCEDURE — 97110 THERAPEUTIC EXERCISES: CPT | Performed by: PHYSICAL THERAPIST

## 2019-09-02 PROCEDURE — 1280000000 HC REHAB R&B

## 2019-09-02 PROCEDURE — 85025 COMPLETE CBC W/AUTO DIFF WBC: CPT

## 2019-09-02 PROCEDURE — 80048 BASIC METABOLIC PNL TOTAL CA: CPT

## 2019-09-02 PROCEDURE — 6360000002 HC RX W HCPCS: Performed by: PHYSICAL MEDICINE & REHABILITATION

## 2019-09-02 PROCEDURE — 94760 N-INVAS EAR/PLS OXIMETRY 1: CPT

## 2019-09-02 PROCEDURE — 97530 THERAPEUTIC ACTIVITIES: CPT

## 2019-09-02 PROCEDURE — 36415 COLL VENOUS BLD VENIPUNCTURE: CPT

## 2019-09-02 PROCEDURE — 97110 THERAPEUTIC EXERCISES: CPT

## 2019-09-02 RX ORDER — CLONIDINE HYDROCHLORIDE 0.1 MG/1
0.1 TABLET ORAL 2 TIMES DAILY
Status: DISCONTINUED | OUTPATIENT
Start: 2019-09-02 | End: 2019-09-03

## 2019-09-02 RX ADMIN — CLOPIDOGREL BISULFATE 75 MG: 75 TABLET ORAL at 08:54

## 2019-09-02 RX ADMIN — ATORVASTATIN CALCIUM 80 MG: 80 TABLET, FILM COATED ORAL at 22:17

## 2019-09-02 RX ADMIN — POTASSIUM CHLORIDE 10 MEQ: 750 TABLET, EXTENDED RELEASE ORAL at 08:54

## 2019-09-02 RX ADMIN — CLONIDINE HYDROCHLORIDE 0.1 MG: 0.1 TABLET ORAL at 13:50

## 2019-09-02 RX ADMIN — AMLODIPINE BESYLATE 10 MG: 10 TABLET ORAL at 08:54

## 2019-09-02 RX ADMIN — BACLOFEN 5 MG: 10 TABLET ORAL at 13:22

## 2019-09-02 RX ADMIN — ENOXAPARIN SODIUM 40 MG: 40 INJECTION SUBCUTANEOUS at 08:54

## 2019-09-02 RX ADMIN — SENNOSIDES, DOCUSATE SODIUM 1 TABLET: 50; 8.6 TABLET, FILM COATED ORAL at 22:17

## 2019-09-02 RX ADMIN — ASPIRIN 81 MG: 81 TABLET ORAL at 08:54

## 2019-09-02 RX ADMIN — BACLOFEN 5 MG: 10 TABLET ORAL at 22:20

## 2019-09-02 RX ADMIN — PANTOPRAZOLE SODIUM 40 MG: 40 TABLET, DELAYED RELEASE ORAL at 05:24

## 2019-09-02 RX ADMIN — CLONIDINE HYDROCHLORIDE 0.1 MG: 0.1 TABLET ORAL at 22:17

## 2019-09-02 RX ADMIN — ACETAMINOPHEN 650 MG: 325 TABLET ORAL at 08:53

## 2019-09-02 ASSESSMENT — PAIN DESCRIPTION - FREQUENCY
FREQUENCY: CONTINUOUS
FREQUENCY: CONTINUOUS

## 2019-09-02 ASSESSMENT — PAIN DESCRIPTION - ORIENTATION: ORIENTATION: LEFT

## 2019-09-02 ASSESSMENT — PAIN SCALES - GENERAL
PAINLEVEL_OUTOF10: 0
PAINLEVEL_OUTOF10: 3
PAINLEVEL_OUTOF10: 5
PAINLEVEL_OUTOF10: 0
PAINLEVEL_OUTOF10: 0

## 2019-09-02 ASSESSMENT — PAIN DESCRIPTION - PROGRESSION
CLINICAL_PROGRESSION: GRADUALLY IMPROVING
CLINICAL_PROGRESSION: GRADUALLY WORSENING

## 2019-09-02 ASSESSMENT — PAIN DESCRIPTION - ONSET
ONSET: ON-GOING
ONSET: ON-GOING

## 2019-09-02 ASSESSMENT — PAIN DESCRIPTION - DESCRIPTORS: DESCRIPTORS: DISCOMFORT

## 2019-09-02 ASSESSMENT — PAIN DESCRIPTION - PAIN TYPE: TYPE: ACUTE PAIN

## 2019-09-02 ASSESSMENT — PAIN DESCRIPTION - LOCATION: LOCATION: SHOULDER

## 2019-09-02 ASSESSMENT — PAIN - FUNCTIONAL ASSESSMENT: PAIN_FUNCTIONAL_ASSESSMENT: ACTIVITIES ARE NOT PREVENTED

## 2019-09-02 NOTE — PROGRESS NOTES
Occupational Therapy  Facility/Department: [address] IP REHAB  Daily Treatment Note  NAME: Wilder San  : 1970  MRN: [id number]    Date of Service: 2019    Discharge Recommendations:  Home with assist PRN, Home with Home health OT, S Level 2  OT Equipment Recommendations  Other: TBD--pt's mother owns a TTB, RTS, BSC; pt may need q cane--insurance likely to cover q cane    Assessment   Performance deficits / Impairments: Decreased functional mobility ; Decreased cognition;Decreased ADL status; Decreased ROM; Decreased strength;Decreased balance;Decreased sensation;Decreased coordination;Decreased fine motor control;Decreased high-level IADLs  Assessment: Pt declined a shower, seems distressed about her BP, changing meds and dealing w/ \"anger management. \" Discussed ways to reduce stress, contorl her diet & wt loss. OT provided instruction to pt & her mother to use gentle shaking to reduce tone in L shld. Used vibration & gentle stretching to reduce tone, aware she can have Baclofen up to 3xs a day--RN  notified & provided for pt at beginning of session; tone was reduced by end of 90 minute session. [facility] & what is considered being home bound--suggested she talk w/ SW regarding this. Cont w/ POC thru   to address being IND w/ ADLs, t/f and addressing L UE function,, she will return to her mother's home & receive HH services including OT  Treatment Diagnosis: Impaired ADLs and functional mobility w/ L hemiparesis and L neglect   Prognosis: Good  History: PMH includes: HTN.  obesity  Exam: ADLs & functional mobility  Assistance / Modification: C;ose SBA w/ t/f using q cane, SB A w/  LB ADLs, SBA w/ UB ADLs & toilet tasks  Patient Education: discussed stress reduction activities, wt loss and anger management issues--pt wants to \"take a class\" on anger management  REQUIRES OT FOLLOW UP: Yes  Activity Tolerance  Activity Tolerance: Patient limited by pain  Activity Tolerance: pt cooperative but

## 2019-09-02 NOTE — PROGRESS NOTES
w/ Reflex to MG    Collection Time: 09/02/19  6:44 AM   Result Value Ref Range    Sodium 139 136 - 145 mmol/L    Potassium reflex Magnesium 4.1 3.5 - 5.1 mmol/L    Chloride 104 99 - 110 mmol/L    CO2 25 21 - 32 mmol/L    Anion Gap 10 3 - 16    Glucose 108 (H) 70 - 99 mg/dL    BUN 11 7 - 20 mg/dL    CREATININE 0.6 0.6 - 1.1 mg/dL    GFR Non-African American >60 >60    GFR African American >60 >60    Calcium 9.1 8.3 - 10.6 mg/dL       Therapy progress:  PT  Position Activity Restriction  Other position/activity restrictions: Impulsive, mild left neglect. Cardiac diet. Objective     Sit to Stand: Supervision, Stand by assistance  Stand to sit: Stand by assistance, Supervision  Bed to Chair: Supervision, Stand by assistance  Device: [employer]  Other Apparatus: Left(Bobath sling)  Assistance: Stand by assistance  Distance: 180' x 2, shorter distances with multiple turns to access curb and steps  OT  PT Equipment Recommendations  Equipment Needed: Yes  Mobility Devices: Canes  Cane: [employer]  Other: recommend small base quad cane (SBQC)  Toilet - Technique: Ambulating  Equipment Used: Grab bars  Toilet Transfers Comments: gave close SBA on/off toilet, used GB on R side, tends to sit slightly off center on toilet          SLP:   Cognitive Diagnosis: [place] cogntion intact except for mild deficits in working memory and complex processing. Further assessment in complex cognitive linuistic skills for executive function. Speech Diagnosis: Pt with mild dysarthria characterized by left sided weakess resulting in mild phonemic distortions and syllable reductions. Pt has a rapid rate of speech. Pt's speech is adible and >90% intellgibile. Communication Diagnosis: Pt with deficits in complex processing. This may be pre-morbid. Ongoing assessment warranted.    Dysphagia Diagnosis: Mild to moderate oral stage dysphagia, Mild pharyngeal stage dysphagia  Dysphagia Outcome Severity Scale: Level 5: Mild

## 2019-09-03 PROCEDURE — 6360000002 HC RX W HCPCS: Performed by: PHYSICAL MEDICINE & REHABILITATION

## 2019-09-03 PROCEDURE — 6370000000 HC RX 637 (ALT 250 FOR IP): Performed by: PHYSICAL MEDICINE & REHABILITATION

## 2019-09-03 PROCEDURE — 97530 THERAPEUTIC ACTIVITIES: CPT | Performed by: PHYSICAL THERAPIST

## 2019-09-03 PROCEDURE — 97110 THERAPEUTIC EXERCISES: CPT | Performed by: PHYSICAL THERAPIST

## 2019-09-03 PROCEDURE — 94760 N-INVAS EAR/PLS OXIMETRY 1: CPT

## 2019-09-03 PROCEDURE — 97535 SELF CARE MNGMENT TRAINING: CPT

## 2019-09-03 PROCEDURE — 1280000000 HC REHAB R&B

## 2019-09-03 PROCEDURE — 97116 GAIT TRAINING THERAPY: CPT | Performed by: PHYSICAL THERAPIST

## 2019-09-03 PROCEDURE — 97530 THERAPEUTIC ACTIVITIES: CPT

## 2019-09-03 RX ORDER — PANTOPRAZOLE SODIUM 40 MG/1
40 TABLET, DELAYED RELEASE ORAL
Qty: 30 TABLET | Refills: 3 | Status: SHIPPED | OUTPATIENT
Start: 2019-09-04 | End: 2019-11-04 | Stop reason: SDUPTHER

## 2019-09-03 RX ORDER — AMLODIPINE BESYLATE 10 MG/1
10 TABLET ORAL DAILY
Qty: 30 TABLET | Refills: 3 | Status: SHIPPED | OUTPATIENT
Start: 2019-09-04 | End: 2019-11-04 | Stop reason: SDUPTHER

## 2019-09-03 RX ORDER — BACLOFEN 5 MG/1
5 TABLET ORAL 3 TIMES DAILY PRN
Qty: 90 TABLET | Refills: 3 | Status: SHIPPED | OUTPATIENT
Start: 2019-09-03 | End: 2019-11-04 | Stop reason: SDUPTHER

## 2019-09-03 RX ORDER — CLOPIDOGREL BISULFATE 75 MG/1
75 TABLET ORAL DAILY
Qty: 30 TABLET | Refills: 3 | Status: SHIPPED | OUTPATIENT
Start: 2019-09-03 | End: 2019-11-04 | Stop reason: SDUPTHER

## 2019-09-03 RX ORDER — ASPIRIN 81 MG/1
81 TABLET ORAL DAILY
Qty: 30 TABLET | Refills: 3 | Status: SHIPPED | OUTPATIENT
Start: 2019-09-04 | End: 2019-11-04 | Stop reason: SDUPTHER

## 2019-09-03 RX ORDER — ATORVASTATIN CALCIUM 80 MG/1
80 TABLET, FILM COATED ORAL NIGHTLY
Qty: 30 TABLET | Refills: 3 | Status: SHIPPED | OUTPATIENT
Start: 2019-09-03 | End: 2019-11-04 | Stop reason: SDUPTHER

## 2019-09-03 RX ORDER — CLONIDINE HYDROCHLORIDE 0.2 MG/1
0.2 TABLET ORAL 2 TIMES DAILY
Qty: 60 TABLET | Refills: 3 | Status: SHIPPED | OUTPATIENT
Start: 2019-09-03 | End: 2019-11-04 | Stop reason: SDUPTHER

## 2019-09-03 RX ORDER — POTASSIUM CHLORIDE 750 MG/1
10 TABLET, FILM COATED, EXTENDED RELEASE ORAL
Qty: 30 TABLET | Refills: 3 | Status: SHIPPED | OUTPATIENT
Start: 2019-09-04 | End: 2019-11-04 | Stop reason: SDUPTHER

## 2019-09-03 RX ORDER — CLONIDINE HYDROCHLORIDE 0.1 MG/1
0.2 TABLET ORAL 2 TIMES DAILY
Status: DISCONTINUED | OUTPATIENT
Start: 2019-09-03 | End: 2019-09-04 | Stop reason: HOSPADM

## 2019-09-03 RX ADMIN — POTASSIUM CHLORIDE 10 MEQ: 750 TABLET, EXTENDED RELEASE ORAL at 08:09

## 2019-09-03 RX ADMIN — ENOXAPARIN SODIUM 40 MG: 40 INJECTION SUBCUTANEOUS at 08:12

## 2019-09-03 RX ADMIN — PANTOPRAZOLE SODIUM 40 MG: 40 TABLET, DELAYED RELEASE ORAL at 05:42

## 2019-09-03 RX ADMIN — SENNOSIDES, DOCUSATE SODIUM 1 TABLET: 50; 8.6 TABLET, FILM COATED ORAL at 20:38

## 2019-09-03 RX ADMIN — CLOPIDOGREL BISULFATE 75 MG: 75 TABLET ORAL at 08:09

## 2019-09-03 RX ADMIN — BACLOFEN 5 MG: 10 TABLET ORAL at 10:54

## 2019-09-03 RX ADMIN — CLONIDINE HYDROCHLORIDE 0.2 MG: 0.1 TABLET ORAL at 20:38

## 2019-09-03 RX ADMIN — BACLOFEN 5 MG: 10 TABLET ORAL at 20:38

## 2019-09-03 RX ADMIN — ATORVASTATIN CALCIUM 80 MG: 80 TABLET, FILM COATED ORAL at 20:38

## 2019-09-03 RX ADMIN — AMLODIPINE BESYLATE 10 MG: 10 TABLET ORAL at 08:12

## 2019-09-03 RX ADMIN — CLONIDINE HYDROCHLORIDE 0.1 MG: 0.1 TABLET ORAL at 08:12

## 2019-09-03 RX ADMIN — ASPIRIN 81 MG: 81 TABLET ORAL at 08:09

## 2019-09-03 ASSESSMENT — PAIN SCALES - GENERAL
PAINLEVEL_OUTOF10: 0
PAINLEVEL_OUTOF10: 0

## 2019-09-03 NOTE — CARE COORDINATION
[facility] received referral from  for:    SMALL BASE QUAD CANE. Received PT notes and DME Orders. Will verify patient's insurance and follow up with patient to deliver the [facility] today. Thank you for the referral.  Electronically signed by Dominic Albright on 9/3/2019 at 10:27 AM  Cell ph# [phone]    UPDATE 9/3/19 at 12:12 pm-  [facility] rep delivered requested [facility] to patient and reviewed insurance coverage and equipment set up with patient and pt's dtr. SBQC was set to match the height of the sbqc  In pt's room. Notified SW.

## 2019-09-03 NOTE — PROGRESS NOTES
Educated on need to be aware of environment for maximum safety. Pt educated to watch for L foot drop with fatigue. Barriers to Learning:  some L sided neglect - resolving  REQUIRES PT FOLLOW UP: Yes  Activity Tolerance  Activity Tolerance: Patient limited by fatigue;Patient limited by endurance; Patient Tolerated treatment well  Activity Tolerance: Pt able to participate in therapy with fewer and shorter rest breaks. Patient Diagnosis(es): There were no encounter diagnoses. has a past medical history of Hypertension. has a past surgical history that includes  section. Restrictions  Restrictions/Precautions  Restrictions/Precautions: Fall Risk  Position Activity Restriction  Other position/activity restrictions: Impulsive, mild left neglect. Cardiac diet. Subjective   General  Chart Reviewed: Yes  Additional Pertinent Hx: Per Dr. Hleena Valentine, , \"51 y.o. female who presented with facial weakness and progressed to having left face and arm weakness in setting of hypertension overnight. CTA with bilateral soft plaque at bifurcation. Exam with left face and arm weakness much more than leg and right gaze preference suggesting of cortical right MCA stroke. Patient found to have thrombotic right MCA stroke on MRI in deep right corona radiata area. Patient started on DAPT with aspirin and Plavix, blood pressure medication, and patient was started in therapies. Patient is well below her baseline function of being Ind without an AD and working full time; pt currently is needing assist of 1-2 for mobility tasks and needing pedrito stedy lift for transfers with nursing and A of 2 for short distance gait with hemiwalker. Patient needs more therapy before discharge to home, where she lives with her son in a 2nd floor apartment. Patient was approved by her insurance for rehab unit admission today.  She is agreeable to Rehab Unit treatment\"  Response To Previous Treatment: Patient with no complaints from sitting, 10 in standing with parallel bar  Other exercises  Other exercises?: Yes  Other exercises 1: 10 mini-squats in parallel bars                     Goals  Short term goals  Time Frame for Short term goals: In 7-10 days pt will perform  Short term goal 1: Bed mobility Supervision - met 9/3/19  Short term goal 2: Transfers CGA-SBA - met 8/17  Short term goal 3: Ambulation 48' with LRAD, CGA - met 8/17  Short term goal 4: Ascend/Descend 12 steps with Min-CGA - met 8/23  Short term goal 5: Ascend/Descend curb step with Min A -met 8/23  Long term goals  Time Frame for Long term goals : In 14-21 days pt will perform  Long term goal 1: Bed Mobility independently - met 9/3/19  Long term goal 2: Transfers Mod I - met 9/3/19  Long term goal 3: Ambulation 150' with LRAD, Mod I - met 9/3/19  Long term goal 4: Ascend/Descend 12 steps with supervision - met 9/3/19  Long term goal 5: Ascend/Descend curb step with SBA - met 9/3/19  Patient Goals   Patient goals : To improve her arm strength, and leg strength, and to get back to walking, climbing stairs, and driving. Plan    Plan  Specific instructions for Next Treatment: follow up with home PT  Current Treatment Recommendations: Functional Mobility Training, Balance Training, Strengthening, ROM, Transfer Training, Endurance Training, Gait Training, Stair training, Cognitive/Perceptual Training, Neuromuscular Re-education, Home Exercise Program, Safety Education & Training, Patient/Caregiver Education & Training, Equipment Evaluation, Education, & procurement  Plan Comment: Plan to discharge home tomorrow, Wednesday 9/4/19. Recommend home PT for smooth and safe transition and to continue to progress device, strengthening/muscle control, and balance with functional mobility. Safety Devices  Type of devices:  All fall risk precautions in place, Gait belt, Patient at risk for falls(Pt returned to room by transport. )  Restraints  Initially in place: No     Therapy Time Individual Concurrent Group Co-treatment   Time In 1345         Time Out 1430         Minutes 45         Timed Code Treatment Minutes: 39 Minutes       Luís Tobias, SU  Therapist was present, directed the patient's care, made skilled judgement, and was responsible for assessment and treatment of the patient.          Electronically signed by Theodore Avendano, PT #[id number] on 9/3/2019 at 3:26 PM

## 2019-09-03 NOTE — PROGRESS NOTES
bench  Tub - Technique: Ambulating  Tub Transfers: Contact guard  Tub Transfers Comments: DRY t/f to TTB using q cane, able to lift B LEs in/out of tub w/ min VCs, extra time/effort to lift L LE    Shower Transfers  Shower - Transfer From: [employer] - Transfer Type: To and From  Shower - Transfer To: Shower seat with back  Shower - Technique: Ambulating  Shower Transfers: Stand by assistance  Shower Transfers Comments: close SB A using  cane & GB, places L hand on bar gross grasp, dycem on bar    Car Transfers  Car - Transfer From: [employer] - Technique: Ambulating  Car Transfers: Stand by assistance, Verbal cues  Car Transfers: Pt completed car t/f w/ SBA using q cane, needed min VCs to  recall safe hand placement and management of L federico body throughout. Functional Mobility  Functional - Mobility Device: Cane  Activity: To/from bathroom  Assist Level: Modified independent   Functional Mobility Comments: pt ambulated short distance from bed<toilet<sink using q cane, no LOB but tends to turn 360* instead of 180* to conserve energy and reduce fall; needs close SBA when entering/exiting shower stall--gave VCs to transition R hand to GB    Instrumental ADL's  Instrumental ADLs: Yes  Meal Prep  Meal Prep Level: Cane  Meal Prep Level of Assistance: Stand by assistance  Meal Preparation: pt used q cane in kitchen, able to transport items by sliding on counter from cabinet to microwave. Instructed to use teeth to open oatmeal packet and pour into bowl. Able to add water and place into microwave w/ SBA. Shown how to use R hand on counters to ambulate short distances along sink & stove. She transported kettle from stove to sink and emptied it w/ close SBA. Gave photo copies of 1 handed kitcken items including ones to wash dishes, chop & cut food items.   Pt able to stand x 10 min while folding clothes 1 handed; OT guided L UE for assisting & using as stabilizer during task                   Perception  Overall

## 2019-09-03 NOTE — PROGRESS NOTES
bars  Toilet Transfers Comments: gave close SBA on/off toilet, used GB on R side, tends to sit slightly off center on toilet          SLP:   Cognitive Diagnosis: [place] cogntion intact except for mild deficits in working memory and complex processing. Further assessment in complex cognitive linuistic skills for executive function. Speech Diagnosis: Pt with mild dysarthria characterized by left sided weakess resulting in mild phonemic distortions and syllable reductions. Pt has a rapid rate of speech. Pt's speech is adible and >90% intellgibile. Communication Diagnosis: Pt with deficits in complex processing. This may be pre-morbid. Ongoing assessment warranted. Dysphagia Diagnosis: Mild to moderate oral stage dysphagia, Mild pharyngeal stage dysphagia  Dysphagia Outcome Severity Scale: Level 5: Mild dysphagia- Distant supervision. May need one diet consistency restricted       Assessment and Plan:        Acute infarct within the right mid corona radiata -Plavix, aspirin      Hypertensive urgency with a hx of HTN -norvasc, hydralazine prn     Mixed hyperlipidemia- Lipitor     Obesity with BMI of 36.43     Bowels: Schedule Miralax + Senna S. Follow bowel movements. Enema or suppository if needed.      Bladder: Check PVR x 3.   [facility] if PVR > 200ml or if any volume is > 500 ml.      Pain: Tylenol is ordered prn.        Carrie East MD, 9/3/2019, 7:14 AM

## 2019-09-03 NOTE — PROGRESS NOTES
Occupational Therapy  Facility/Department: [address] IP REHAB  Daily Treatment Note  NAME: Tatiana Malik  : 1970  MRN: [id number]    Date of Service: 9/3/2019    Discharge Recommendations:  Home with assist PRN, Home with Home health OT, S Level 2  OT Equipment Recommendations  Other: TBD--pt's mother owns a TTB, RTS, BSC; pt may need q cane--insurance likely to cover q cane    Assessment   Performance deficits / Impairments: Decreased functional mobility ; Decreased cognition;Decreased ADL status; Decreased ROM; Decreased strength;Decreased balance;Decreased sensation;Decreased coordination;Decreased fine motor control;Decreased high-level IADLs  Assessment: pt has met  STGs &  LTGs w/ OT intervention, mother present for additional instruction during shower level bathing & dressing; pt requires MI to don L sock & shoe  using federico tech + A/E-- Good use of reacher noted. She is now able to transfer on/off shower chair w/ Close SBA in each direction, placed L hand on GB using dycem to support. She is now able to complete grooming & feeding tasks w/ MI, managed UB dressing IND'ly. Completed room transfers including toilet tasks w/ MI using q cane or GB. Discussed HH services--suggested she talk w/ SW regarding this. Cont w/ POC thru   to address being IND w/ ADLs, t/f and addressing L UE function,, she will return to her mother's home & receive HH services including OT  Treatment Diagnosis: Impaired ADLs and functional mobility w/ L hemiparesis and L neglect   Prognosis: Good  History: PMH includes: HTN.  obesity  Exam: ADLs & functional mobility  Assistance / Modification: MI w/ t/f using q cane, MIw/  LB ADLs, IND w/ UB ADLs & toilet tasks  OT Education: ADL [employer]ptive [employer]ies  Patient Education: using dycem on GB to support L UE & hand during bathing tasks  REQUIRES OT FOLLOW UP: Yes  Activity Tolerance  Activity Tolerance: Patient Tolerated treatment well  Activity Tolerance: pt in good spirits, using least restrictive AD)--goal met 8/26/19  Long term goals  Time Frame for Long term goals : In 3 - 4 weeks,  Long term goal 1: Pt will complete feeding with mod I for extra time to open containers--goal met  Long term goal 2: Pt will complete grooming seated with mod I for extra time using [employer] --goal met  Long term goal 3: Pt will complete bathing with close SBA and setup seated on BSC--goal met  Long term goal 4: Pt will complete UE dressing with mod I for extra time --goal met  Long term goal 5: Pt will complete LE dressing & toileting with SBA and use of A/E--goal met  Long term goals 6: Pt will tolerate standing for 8 - 10 minutes with SBA during ADL participation --goal met 8/29/19  Long term goal 7: Pt will attend to L hemibody with min VCs during ADL participation--goal met  Long term goal 8: Pt will complete w/c mobility for 150 feet and household t/f with SBA --goal met using q cane  Patient Goals   Patient goals :  \"To get better, to get this back (referring to L UE)\"        Therapy Time   Individual Concurrent Group PM-treatment   Time In 0815      1300   Time Out 0915      1345   Minutes 60     45   Timed Code Treatment Minutes: [address]  Celeste Persaud, [place] #[id number]

## 2019-09-03 NOTE — DISCHARGE INSTR - COC
None    Nursing Mobility/ADLs:  Walking   Assisted  Transfer  Assisted  Bathing  Assisted  Dressing  Assisted  Toileting  Assisted  Feeding  Independent  Med Admin  Assisted  Med Delivery   whole    Wound Care Documentation and Therapy:        Elimination:  Continence:   · Bowel: Yes  · Bladder: Yes  Urinary Catheter: None   Colostomy/Ileostomy/Ileal Conduit: No       Date of Last BM: 9/2/2019  No intake or output data in the 24 hours ending 09/03/19 0727  No intake/output data recorded. Safety Concerns: At Risk for Falls    Impairments/Disabilities:      Flaccid left arm and limited movement to the left lower extremity    Nutrition Therapy:  Current Nutrition Therapy:   - Oral Diet:  General    Routes of Feeding: Oral  Liquids: Thin Liquids  Daily Fluid Restriction: no  Last Modified Barium Swallow with Video (Video Swallowing Test): not done    Treatments at the Time of Hospital Discharge:   Respiratory Treatments: none  Oxygen Therapy:  is not on home oxygen therapy.   Ventilator:    - No ventilator support    Rehab Therapies: Physical Therapy, Occupational Therapy and Speech/Language Therapy  Weight Bearing Status/Restrictions: No weight bearing restirctions  Other Medical Equipment (for information only, NOT a DME order):  cane  Other Treatments: none    Patient's personal belongings (please select all that are sent with patient):  clothing, cellphone    RN SIGNATURE:  Electronically signed by Amanda Mcdermott RN on 9/4/19 at 10:49 AM    CASE MANAGEMENT/SOCIAL WORK SECTION    Inpatient Status Date: 8-    Readmission Risk Assessment Score:  Readmission Risk              Risk of Unplanned Readmission:        15           Discharging to Facility/ Agency   · Name:     [address]  · Address:  · Phone:     [phone]  Fax:               [phone]    / signature: Duncan Torres [facility]     Case Management   [phone]    9/3/2019  4:52 PM      PHYSICIAN

## 2019-09-03 NOTE — PATIENT CARE CONFERENCE
Claire    :     Occupational Therapist: Torey Fisher  Physical Therapist: Diana Ackerman, SPT  Electronically signed by Donna Varela PT [id number] on 9/3/2019 at 12:26 PM  Speech Therapist:   Nurse: Kely Sears RN, [address] [place] [address]   Nia Carrillo, PT, MPT     I led this team conference and I approve the established interdisciplinary plan of care as documented within the medical record of Angelo Blunt.     MD: Dr. Ama Ray

## 2019-09-04 VITALS
WEIGHT: 226.85 LBS | RESPIRATION RATE: 19 BRPM | OXYGEN SATURATION: 95 % | TEMPERATURE: 97.7 F | DIASTOLIC BLOOD PRESSURE: 82 MMHG | SYSTOLIC BLOOD PRESSURE: 118 MMHG | HEART RATE: 69 BPM | BODY MASS INDEX: 35.61 KG/M2 | HEIGHT: 67 IN

## 2019-09-04 PROCEDURE — 6370000000 HC RX 637 (ALT 250 FOR IP): Performed by: PHYSICAL MEDICINE & REHABILITATION

## 2019-09-04 PROCEDURE — 94760 N-INVAS EAR/PLS OXIMETRY 1: CPT

## 2019-09-04 RX ADMIN — PANTOPRAZOLE SODIUM 40 MG: 40 TABLET, DELAYED RELEASE ORAL at 06:04

## 2019-09-04 RX ADMIN — AMLODIPINE BESYLATE 10 MG: 10 TABLET ORAL at 09:21

## 2019-09-04 RX ADMIN — ASPIRIN 81 MG: 81 TABLET ORAL at 09:21

## 2019-09-04 RX ADMIN — POTASSIUM CHLORIDE 10 MEQ: 750 TABLET, EXTENDED RELEASE ORAL at 09:22

## 2019-09-04 RX ADMIN — CLONIDINE HYDROCHLORIDE 0.2 MG: 0.1 TABLET ORAL at 09:22

## 2019-09-04 RX ADMIN — CLOPIDOGREL BISULFATE 75 MG: 75 TABLET ORAL at 09:22

## 2019-09-04 ASSESSMENT — PAIN SCALES - GENERAL
PAINLEVEL_OUTOF10: 0

## 2019-09-04 NOTE — PLAN OF CARE
Problem: ACTIVITY INTOLERANCE/IMPAIRED MOBILITY  Goal: Mobility/activity is maintained at optimum level for patient  9/4/2019 0136 by Israel Calhoun RN  Outcome: Ongoing     Problem: Falls - Risk of:  Goal: Will remain free from falls  Description  Will remain free from falls  9/4/2019 0136 by Israel Calhoun RN  Outcome: Ongoing     Problem: Falls - Risk of:  Goal: Absence of physical injury  Description  Absence of physical injury  9/4/2019 0136 by Israel Calhoun RN  Outcome: Ongoing

## 2019-09-04 NOTE — PROGRESS NOTES
Recommendations  Equipment Needed: Yes  Mobility Devices: Canes  Cane: [employer]  Other: recommend small base quad cane (SBQC)  Toilet - Technique: Ambulating  Equipment Used: Grab bars  Toilet Transfers Comments: MI on/off toilet, used GB on R side, tends to sit slightly off center on toilet but no LOB          SLP:   Cognitive Diagnosis: [place] cogntion intact except for mild deficits in working memory and complex processing. Further assessment in complex cognitive linuistic skills for executive function. Speech Diagnosis: Pt with mild dysarthria characterized by left sided weakess resulting in mild phonemic distortions and syllable reductions. Pt has a rapid rate of speech. Pt's speech is adible and >90% intellgibile. Communication Diagnosis: Pt with deficits in complex processing. This may be pre-morbid. Ongoing assessment warranted. Dysphagia Diagnosis: Mild to moderate oral stage dysphagia, Mild pharyngeal stage dysphagia  Dysphagia Outcome Severity Scale: Level 5: Mild dysphagia- Distant supervision. May need one diet consistency restricted       Assessment and Plan:        Acute infarct within the right mid corona radiata -Plavix, aspirin      Hypertensive urgency with a hx of HTN -norvasc, hydralazine prn     Mixed hyperlipidemia- Lipitor     Obesity with BMI of 36.43     Bowels: Schedule Miralax + Senna S. Follow bowel movements. Enema or suppository if needed.      Bladder: Check PVR x 3.   [facility] if PVR > 200ml or if any volume is > 500 ml.      Pain: Tylenol is ordered prn.        Shelly Abarca MD, 9/4/2019, 7:45 AM

## 2019-09-04 NOTE — DISCHARGE SUMMARY
Department of [facility]  Dr. Sushil Monet Discharge Summary     Patient Identification:  Bull Rosenberg  : 1970  Admit date: 2019  Discharge date: 19   Attending provider: Jeana Esparza MD        Primary care provider: Toño Wade MD     Discharge Diagnoses:   Patient Active Problem List   Diagnosis    Acute ischemic stroke [facility])    Right sided cerebral hemisphere cerebrovascular accident (CVA) [facility])         Discharge Functional Status:    Physical therapy:  Bed Mobility: Scooting: Modified independent  Transfers: Sit to Stand: Modified independent  Stand to sit: Modified independent  Bed to Chair: Modified independent  Squat Pivot Transfers: Minimal Assistance, Moderate Assistance(Mod A initial trial; Min A second trial)  Comment: Patient toilet with + void  with SBA, no assist for pericare or pants, able to wipe wash hand without assistance. Mother able to provide SBA with toileting on a consistent basis. Patient did not realize that the therapy sessions are scheduled for 90 minutes today and requested ample rest breaks between activities. Gave patient contact phone number to schedule outpatient PT and OT at [facility] once she is complete with home therapies. , Ambulation 1  Surface: level tile  Device: Small Syed [place]  Other Apparatus: Left(Bobath sling)  Assistance: Modified Independent  Quality of Gait: Patient keeps L LE with minimal hip and knee flexion during stance phase, with no buckling,more foot flat L LE. Gait Deviations: Slow Evelyn, Decreased step length, Decreased step height, Decreased arm swing, Decreased head and trunk rotation  Distance: 220'   Comments: Pt able to monitor own fatigue throughout walk.  Took 3 standing rest breaks as needed., Stairs  # Steps : 12  Stairs Height: 6\"  Rails: Right ascending(right descending)  Curbs: 6\"  Device: Small Syed Iman  Other Apparatus: Left(L bobath sling)  Assistance: Supervision  Comment: Pt exhibited good safety awareness throughout stair activity. Pt requires close SUP to ensure foot clearance each step. Pt needs a family member to carry cane up stairs / down stairs. Pt able to clear curb with SBA for cues to fully clear L foot. Mobility: Bed, Chair, Wheel Chair: 6 - Requires assistive device (slide rail)  Walk: 6 - Modified [place]  Walks at least 150 feet with an ambulatory device, orthosis or prosthesis OR requires extra amount of time OR there is concern for safety  Distance Walked: 220'  Wheel Chair: 1 - Total Assistance Operates wheelchair < 50 feet OR requires two or more people OR patient performs < 25% of locomotion effort  Distance Traveled in Wheel Chair: 25'  Stairs: 5- Supervision Requires supervision(e.g., standing by, cuing, or coaxing) to go up and down one flight of stairs, PT Equipment Recommendations  Equipment Needed: Yes  Mobility Devices: Canes  Cane: [employer]  Other: recommend small base quad cane [facility]), Assessment: Pt continues to make progress with LLE strength, balance and ambulation. She has met 5/5 short term goals, and 4/5 LTG. She is prepared for discharge tomorrow, Wednesday, 19. She was able to ambulate up to 220' with SBQC, no assistance, no LOB. Pt able to stand unassisted with eyes closed x 30 seconds. Able to take lateral, forward, and backward steps in box pattern. Patient does report fatigue especially in LLE and needs short rest breaks throughout session. Patient able to transfer with modified independence. Patient's mother can consistently provide necessary supervision for transfers, ambulation, and assist with toileting. Patient able to perform 12 steps with rail with close SUP. Pt progress consistent with plan to D/C tomorrow home with mother. Mother's home equipped with grab bars, accessible bathroom, 24 assist. Pt would benefit from home PT services to ensure safe ands smooth transition.      Occupational therapy: [facility] - Feeds known as:  PLAVIX  Take 1 tablet by mouth daily     pantoprazole 40 MG tablet  Commonly known as:  PROTONIX  Take 1 tablet by mouth every morning (before breakfast)        STOP taking these medications    calcium carbonate 500 MG chewable tablet  Commonly known as:  TUMS     hydrochlorothiazide 25 MG tablet  Commonly known as:  HYDRODIURIL     potassium chloride 20 MEQ extended release tablet  Commonly known as:  KLOR-CON M           Where to Get Your Medications      You can get these medications from any pharmacy    Bring a paper prescription for each of these medications  · amLODIPine 10 MG tablet  · aspirin 81 MG EC tablet  · atorvastatin 80 MG tablet  · Baclofen 5 MG Tabs  · cloNIDine 0.2 MG tablet  · clopidogrel 75 MG tablet  · pantoprazole 40 MG tablet  · potassium chloride 10 MEQ extended release tablet            I spent over 35 minutes on this discharge encounter between counseling, coordination of care, and medication reconciliation.         To comply with [facility] bylaw R.II.4.1:   Discharge order placed in advance to facilitate patients discharge needs      Bessy Pappas MD, 9/4/2019, 9:34 AM

## 2019-09-20 ENCOUNTER — OFFICE VISIT (OUTPATIENT)
Dept: FAMILY MEDICINE CLINIC | Age: 49
End: 2019-09-20
Payer: COMMERCIAL

## 2019-09-20 VITALS
BODY MASS INDEX: 35.45 KG/M2 | HEIGHT: 66 IN | SYSTOLIC BLOOD PRESSURE: 128 MMHG | WEIGHT: 220.6 LBS | DIASTOLIC BLOOD PRESSURE: 86 MMHG

## 2019-09-20 DIAGNOSIS — Z76.89 ENCOUNTER TO ESTABLISH CARE: Primary | ICD-10-CM

## 2019-09-20 DIAGNOSIS — I10 ESSENTIAL HYPERTENSION: ICD-10-CM

## 2019-09-20 DIAGNOSIS — J30.9 ALLERGIC RHINITIS, UNSPECIFIED SEASONALITY, UNSPECIFIED TRIGGER: ICD-10-CM

## 2019-09-20 DIAGNOSIS — I63.9 RIGHT SIDED CEREBRAL HEMISPHERE CEREBROVASCULAR ACCIDENT (CVA) (HCC): ICD-10-CM

## 2019-09-20 PROCEDURE — 99203 OFFICE O/P NEW LOW 30 MIN: CPT | Performed by: FAMILY MEDICINE

## 2019-09-20 RX ORDER — LORATADINE 10 MG/1
10 TABLET ORAL DAILY
Qty: 30 TABLET | Refills: 2 | Status: SHIPPED | OUTPATIENT
Start: 2019-09-20 | End: 2019-10-04 | Stop reason: SDUPTHER

## 2019-09-20 ASSESSMENT — ENCOUNTER SYMPTOMS
TROUBLE SWALLOWING: 0
VOMITING: 0
NAUSEA: 0
DIARRHEA: 0
RHINORRHEA: 0
WHEEZING: 0
SHORTNESS OF BREATH: 0
COUGH: 0
FACIAL SWELLING: 0
ABDOMINAL PAIN: 0
SINUS PRESSURE: 0
CHEST TIGHTNESS: 0
SORE THROAT: 0

## 2019-09-20 ASSESSMENT — PATIENT HEALTH QUESTIONNAIRE - PHQ9
SUM OF ALL RESPONSES TO PHQ9 QUESTIONS 1 & 2: 0
1. LITTLE INTEREST OR PLEASURE IN DOING THINGS: 0
SUM OF ALL RESPONSES TO PHQ QUESTIONS 1-9: 0
2. FEELING DOWN, DEPRESSED OR HOPELESS: 0
SUM OF ALL RESPONSES TO PHQ QUESTIONS 1-9: 0

## 2019-09-20 NOTE — PROGRESS NOTES
2019    Padilla Broderick (:  1970) is a 52 y.o. female, here to establish care with pcp. The patient recently had a CVA and was discharged from the hospital two weeks ago. She is present with her family today. 1.  Encounter to establish care- will review the patient's hx and medication, list.     2.  CVA- patient was discharged for acute ischemic stroke right sided cerebral hemisphere, she has deficits of left arm and left leg, though left leg movement is much improved, she is confined to a wheelchair today but has PT/OT twice a week in the home, she also has speech therapy. Patient stated on DAPT, blood pressure medication, as well as therapy. 3.  Edema- left leg, has been present since CVA, believes it has improved, she is using compression stockings,     4. Allergic rhinitis- a chronic condition, congestion, eyes watery, itchy, has been on medication in the past.     5.  HTN- well controlled at this time, stable on medications, no side effects. Today, denied chest pain, sob, n, v, or diarrhea. HPI    Review of Systems   Constitutional: Positive for activity change and fatigue. Negative for fever and unexpected weight change. HENT: Positive for congestion. Negative for ear pain, facial swelling, mouth sores, rhinorrhea, sinus pressure, sore throat and trouble swallowing. Eyes: Positive for itching. Negative for discharge and visual disturbance. Respiratory: Negative for cough, chest tightness, shortness of breath and wheezing. Cardiovascular: Negative for chest pain and leg swelling. Gastrointestinal: Negative for abdominal pain, diarrhea, nausea and vomiting. Endocrine: Negative for cold intolerance, heat intolerance, polydipsia and polyphagia. Genitourinary: Negative for flank pain, frequency, hematuria, urgency and vaginal bleeding. Musculoskeletal: Positive for arthralgias, gait problem and joint swelling. Skin: Negative for rash.    Neurological: Positive

## 2019-09-24 ENCOUNTER — TELEPHONE (OUTPATIENT)
Dept: FAMILY MEDICINE CLINIC | Age: 49
End: 2019-09-24

## 2019-09-25 ASSESSMENT — ENCOUNTER SYMPTOMS
EYE DISCHARGE: 0
EYE ITCHING: 1

## 2019-10-04 ENCOUNTER — OFFICE VISIT (OUTPATIENT)
Dept: FAMILY MEDICINE CLINIC | Age: 49
End: 2019-10-04
Payer: COMMERCIAL

## 2019-10-04 VITALS
HEIGHT: 66 IN | DIASTOLIC BLOOD PRESSURE: 84 MMHG | BODY MASS INDEX: 35.74 KG/M2 | WEIGHT: 222.4 LBS | SYSTOLIC BLOOD PRESSURE: 130 MMHG

## 2019-10-04 DIAGNOSIS — J30.9 ALLERGIC RHINITIS, UNSPECIFIED SEASONALITY, UNSPECIFIED TRIGGER: ICD-10-CM

## 2019-10-04 DIAGNOSIS — I63.9 RIGHT SIDED CEREBRAL HEMISPHERE CEREBROVASCULAR ACCIDENT (CVA) (HCC): Primary | ICD-10-CM

## 2019-10-04 DIAGNOSIS — I10 ESSENTIAL HYPERTENSION: ICD-10-CM

## 2019-10-04 PROCEDURE — G8417 CALC BMI ABV UP PARAM F/U: HCPCS | Performed by: FAMILY MEDICINE

## 2019-10-04 PROCEDURE — 99213 OFFICE O/P EST LOW 20 MIN: CPT | Performed by: FAMILY MEDICINE

## 2019-10-04 PROCEDURE — 1111F DSCHRG MED/CURRENT MED MERGE: CPT | Performed by: FAMILY MEDICINE

## 2019-10-04 PROCEDURE — G8484 FLU IMMUNIZE NO ADMIN: HCPCS | Performed by: FAMILY MEDICINE

## 2019-10-04 PROCEDURE — G8598 ASA/ANTIPLAT THER USED: HCPCS | Performed by: FAMILY MEDICINE

## 2019-10-04 PROCEDURE — 1036F TOBACCO NON-USER: CPT | Performed by: FAMILY MEDICINE

## 2019-10-04 PROCEDURE — G8427 DOCREV CUR MEDS BY ELIG CLIN: HCPCS | Performed by: FAMILY MEDICINE

## 2019-10-04 RX ORDER — LORATADINE 10 MG/1
10 TABLET ORAL DAILY
Qty: 30 TABLET | Refills: 2 | Status: SHIPPED | OUTPATIENT
Start: 2019-10-04 | End: 2019-11-03

## 2019-10-04 ASSESSMENT — ENCOUNTER SYMPTOMS
SHORTNESS OF BREATH: 0
TROUBLE SWALLOWING: 0
DIARRHEA: 0
SINUS PRESSURE: 0
ABDOMINAL PAIN: 0
WHEEZING: 0
VOMITING: 0
NAUSEA: 0
SORE THROAT: 0

## 2019-10-07 ASSESSMENT — ENCOUNTER SYMPTOMS
BACK PAIN: 1
RHINORRHEA: 1

## 2019-10-14 ENCOUNTER — TELEPHONE (OUTPATIENT)
Dept: FAMILY MEDICINE CLINIC | Age: 49
End: 2019-10-14

## 2019-10-22 ENCOUNTER — HOSPITAL ENCOUNTER (OUTPATIENT)
Dept: PHYSICAL THERAPY | Age: 49
Setting detail: THERAPIES SERIES
Discharge: HOME | End: 2019-10-22
Payer: COMMERCIAL

## 2019-10-22 PROCEDURE — 97112 NEUROMUSCULAR REEDUCATION: CPT

## 2019-10-22 PROCEDURE — 97161 PT EVAL LOW COMPLEX 20 MIN: CPT

## 2019-10-22 PROCEDURE — 97530 THERAPEUTIC ACTIVITIES: CPT

## 2019-10-24 ENCOUNTER — HOSPITAL ENCOUNTER (OUTPATIENT)
Dept: PHYSICAL THERAPY | Age: 49
Setting detail: THERAPIES SERIES
Discharge: HOME | End: 2019-10-24
Payer: COMMERCIAL

## 2019-10-24 PROCEDURE — 97116 GAIT TRAINING THERAPY: CPT

## 2019-10-24 PROCEDURE — 97112 NEUROMUSCULAR REEDUCATION: CPT

## 2019-10-24 PROCEDURE — 97110 THERAPEUTIC EXERCISES: CPT

## 2019-10-29 ENCOUNTER — HOSPITAL ENCOUNTER (OUTPATIENT)
Dept: OCCUPATIONAL THERAPY | Age: 49
Setting detail: THERAPIES SERIES
Discharge: HOME | End: 2019-10-29
Payer: COMMERCIAL

## 2019-10-29 ENCOUNTER — HOSPITAL ENCOUNTER (OUTPATIENT)
Dept: PHYSICAL THERAPY | Age: 49
Setting detail: THERAPIES SERIES
Discharge: HOME | End: 2019-10-29
Payer: COMMERCIAL

## 2019-10-29 PROCEDURE — 97112 NEUROMUSCULAR REEDUCATION: CPT

## 2019-10-29 PROCEDURE — 97110 THERAPEUTIC EXERCISES: CPT

## 2019-10-29 PROCEDURE — 97530 THERAPEUTIC ACTIVITIES: CPT

## 2019-10-29 PROCEDURE — 97116 GAIT TRAINING THERAPY: CPT

## 2019-10-31 ENCOUNTER — HOSPITAL ENCOUNTER (OUTPATIENT)
Dept: PHYSICAL THERAPY | Age: 49
Setting detail: THERAPIES SERIES
Discharge: HOME | End: 2019-10-31
Payer: COMMERCIAL

## 2019-10-31 ENCOUNTER — HOSPITAL ENCOUNTER (OUTPATIENT)
Dept: OCCUPATIONAL THERAPY | Age: 49
Setting detail: THERAPIES SERIES
Discharge: HOME | End: 2019-10-31
Payer: COMMERCIAL

## 2019-10-31 PROCEDURE — 97110 THERAPEUTIC EXERCISES: CPT

## 2019-10-31 PROCEDURE — 97112 NEUROMUSCULAR REEDUCATION: CPT

## 2019-10-31 PROCEDURE — 97032 APPL MODALITY 1+ESTIM EA 15: CPT

## 2019-10-31 PROCEDURE — 97116 GAIT TRAINING THERAPY: CPT

## 2019-11-04 ENCOUNTER — TELEPHONE (OUTPATIENT)
Dept: FAMILY MEDICINE CLINIC | Age: 49
End: 2019-11-04

## 2019-11-04 RX ORDER — BACLOFEN 5 MG/1
5 TABLET ORAL 3 TIMES DAILY PRN
Qty: 270 TABLET | Refills: 0 | Status: SHIPPED | OUTPATIENT
Start: 2019-11-04 | End: 2020-01-28 | Stop reason: SDUPTHER

## 2019-11-04 RX ORDER — CLOPIDOGREL BISULFATE 75 MG/1
75 TABLET ORAL DAILY
Qty: 90 TABLET | Refills: 0 | Status: SHIPPED | OUTPATIENT
Start: 2019-11-04 | End: 2020-01-28 | Stop reason: SDUPTHER

## 2019-11-04 RX ORDER — CLONIDINE HYDROCHLORIDE 0.2 MG/1
0.2 TABLET ORAL 2 TIMES DAILY
Qty: 180 TABLET | Refills: 0 | Status: SHIPPED | OUTPATIENT
Start: 2019-11-04 | End: 2020-01-28 | Stop reason: SDUPTHER

## 2019-11-04 RX ORDER — ATORVASTATIN CALCIUM 80 MG/1
80 TABLET, FILM COATED ORAL NIGHTLY
Qty: 90 TABLET | Refills: 0 | Status: CANCELLED | OUTPATIENT
Start: 2019-11-04 | End: 2019-12-04

## 2019-11-04 RX ORDER — ASPIRIN 81 MG/1
81 TABLET ORAL DAILY
Qty: 90 TABLET | Refills: 0 | Status: SHIPPED | OUTPATIENT
Start: 2019-11-04 | End: 2019-11-10 | Stop reason: ALTCHOICE

## 2019-11-04 RX ORDER — POTASSIUM CHLORIDE 750 MG/1
10 TABLET, FILM COATED, EXTENDED RELEASE ORAL
Qty: 90 TABLET | Refills: 0 | Status: SHIPPED | OUTPATIENT
Start: 2019-11-04 | End: 2020-01-28 | Stop reason: SDUPTHER

## 2019-11-04 RX ORDER — CLOPIDOGREL BISULFATE 75 MG/1
75 TABLET ORAL DAILY
Qty: 90 TABLET | Refills: 0 | Status: CANCELLED | OUTPATIENT
Start: 2019-11-04 | End: 2019-12-04

## 2019-11-04 RX ORDER — BACLOFEN 5 MG/1
5 TABLET ORAL 3 TIMES DAILY PRN
Qty: 270 TABLET | Refills: 0 | Status: CANCELLED | OUTPATIENT
Start: 2019-11-04

## 2019-11-04 RX ORDER — AMLODIPINE BESYLATE 10 MG/1
10 TABLET ORAL DAILY
Qty: 90 TABLET | Refills: 0 | Status: SHIPPED | OUTPATIENT
Start: 2019-11-04 | End: 2020-01-28 | Stop reason: SDUPTHER

## 2019-11-04 RX ORDER — POTASSIUM CHLORIDE 750 MG/1
10 TABLET, FILM COATED, EXTENDED RELEASE ORAL
Qty: 90 TABLET | Refills: 0 | Status: CANCELLED | OUTPATIENT
Start: 2019-11-04

## 2019-11-04 RX ORDER — AMLODIPINE BESYLATE 10 MG/1
10 TABLET ORAL DAILY
Qty: 90 TABLET | Refills: 0 | Status: CANCELLED | OUTPATIENT
Start: 2019-11-04

## 2019-11-04 RX ORDER — PANTOPRAZOLE SODIUM 40 MG/1
40 TABLET, DELAYED RELEASE ORAL
Qty: 90 TABLET | Refills: 0 | Status: CANCELLED | OUTPATIENT
Start: 2019-11-04

## 2019-11-04 RX ORDER — CLONIDINE HYDROCHLORIDE 0.2 MG/1
0.2 TABLET ORAL 2 TIMES DAILY
Qty: 180 TABLET | Refills: 0 | Status: CANCELLED | OUTPATIENT
Start: 2019-11-04

## 2019-11-04 RX ORDER — PANTOPRAZOLE SODIUM 40 MG/1
40 TABLET, DELAYED RELEASE ORAL
Qty: 90 TABLET | Refills: 0 | Status: SHIPPED | OUTPATIENT
Start: 2019-11-04 | End: 2020-01-28 | Stop reason: SDUPTHER

## 2019-11-04 RX ORDER — ATORVASTATIN CALCIUM 80 MG/1
80 TABLET, FILM COATED ORAL NIGHTLY
Qty: 90 TABLET | Refills: 0 | Status: SHIPPED | OUTPATIENT
Start: 2019-11-04 | End: 2020-01-28 | Stop reason: SDUPTHER

## 2019-11-05 ENCOUNTER — HOSPITAL ENCOUNTER (OUTPATIENT)
Dept: PHYSICAL THERAPY | Age: 49
Setting detail: THERAPIES SERIES
Discharge: HOME | End: 2019-11-05
Payer: COMMERCIAL

## 2019-11-05 ENCOUNTER — HOSPITAL ENCOUNTER (OUTPATIENT)
Dept: OCCUPATIONAL THERAPY | Age: 49
Setting detail: THERAPIES SERIES
Discharge: HOME | End: 2019-11-05
Payer: COMMERCIAL

## 2019-11-05 PROCEDURE — 97110 THERAPEUTIC EXERCISES: CPT

## 2019-11-05 PROCEDURE — 97530 THERAPEUTIC ACTIVITIES: CPT

## 2019-11-05 PROCEDURE — 97112 NEUROMUSCULAR REEDUCATION: CPT

## 2019-11-05 PROCEDURE — 97116 GAIT TRAINING THERAPY: CPT

## 2019-11-07 ENCOUNTER — HOSPITAL ENCOUNTER (OUTPATIENT)
Dept: OCCUPATIONAL THERAPY | Age: 49
Setting detail: THERAPIES SERIES
Discharge: HOME | End: 2019-11-07
Payer: COMMERCIAL

## 2019-11-07 ENCOUNTER — HOSPITAL ENCOUNTER (OUTPATIENT)
Dept: PHYSICAL THERAPY | Age: 49
Setting detail: THERAPIES SERIES
Discharge: HOME | End: 2019-11-07
Payer: COMMERCIAL

## 2019-11-07 ENCOUNTER — OFFICE VISIT (OUTPATIENT)
Dept: FAMILY MEDICINE CLINIC | Age: 49
End: 2019-11-07
Payer: COMMERCIAL

## 2019-11-07 VITALS
BODY MASS INDEX: 35.58 KG/M2 | DIASTOLIC BLOOD PRESSURE: 86 MMHG | WEIGHT: 221.4 LBS | SYSTOLIC BLOOD PRESSURE: 128 MMHG | HEIGHT: 66 IN

## 2019-11-07 DIAGNOSIS — I63.9 RIGHT SIDED CEREBRAL HEMISPHERE CEREBROVASCULAR ACCIDENT (CVA) (HCC): Primary | ICD-10-CM

## 2019-11-07 DIAGNOSIS — R01.1 HEART MURMUR: ICD-10-CM

## 2019-11-07 DIAGNOSIS — J30.9 ALLERGIC RHINITIS, UNSPECIFIED SEASONALITY, UNSPECIFIED TRIGGER: ICD-10-CM

## 2019-11-07 DIAGNOSIS — I10 HYPERTENSION, UNSPECIFIED TYPE: ICD-10-CM

## 2019-11-07 PROCEDURE — 97530 THERAPEUTIC ACTIVITIES: CPT

## 2019-11-07 PROCEDURE — 97112 NEUROMUSCULAR REEDUCATION: CPT

## 2019-11-07 PROCEDURE — 97032 APPL MODALITY 1+ESTIM EA 15: CPT

## 2019-11-07 PROCEDURE — 97110 THERAPEUTIC EXERCISES: CPT

## 2019-11-07 PROCEDURE — G8417 CALC BMI ABV UP PARAM F/U: HCPCS | Performed by: FAMILY MEDICINE

## 2019-11-07 PROCEDURE — 97116 GAIT TRAINING THERAPY: CPT

## 2019-11-07 PROCEDURE — 99213 OFFICE O/P EST LOW 20 MIN: CPT | Performed by: FAMILY MEDICINE

## 2019-11-07 PROCEDURE — 1036F TOBACCO NON-USER: CPT | Performed by: FAMILY MEDICINE

## 2019-11-07 PROCEDURE — G8598 ASA/ANTIPLAT THER USED: HCPCS | Performed by: FAMILY MEDICINE

## 2019-11-07 PROCEDURE — G8427 DOCREV CUR MEDS BY ELIG CLIN: HCPCS | Performed by: FAMILY MEDICINE

## 2019-11-07 PROCEDURE — G8484 FLU IMMUNIZE NO ADMIN: HCPCS | Performed by: FAMILY MEDICINE

## 2019-11-07 ASSESSMENT — ENCOUNTER SYMPTOMS
COUGH: 0
NAUSEA: 0
VOMITING: 0
CHEST TIGHTNESS: 0
SINUS PRESSURE: 0
ABDOMINAL PAIN: 0
SORE THROAT: 0
DIARRHEA: 0
TROUBLE SWALLOWING: 0
FACIAL SWELLING: 0
SHORTNESS OF BREATH: 0
WHEEZING: 0
RHINORRHEA: 1

## 2019-11-07 ASSESSMENT — PATIENT HEALTH QUESTIONNAIRE - PHQ9
SUM OF ALL RESPONSES TO PHQ QUESTIONS 1-9: 0
SUM OF ALL RESPONSES TO PHQ9 QUESTIONS 1 & 2: 0
2. FEELING DOWN, DEPRESSED OR HOPELESS: 0
1. LITTLE INTEREST OR PLEASURE IN DOING THINGS: 0
SUM OF ALL RESPONSES TO PHQ QUESTIONS 1-9: 0

## 2019-11-12 ENCOUNTER — HOSPITAL ENCOUNTER (OUTPATIENT)
Dept: PHYSICAL THERAPY | Age: 49
Setting detail: THERAPIES SERIES
Discharge: HOME | End: 2019-11-12
Payer: COMMERCIAL

## 2019-11-12 ENCOUNTER — HOSPITAL ENCOUNTER (OUTPATIENT)
Dept: OCCUPATIONAL THERAPY | Age: 49
Setting detail: THERAPIES SERIES
Discharge: HOME | End: 2019-11-12
Payer: COMMERCIAL

## 2019-11-12 PROCEDURE — 97110 THERAPEUTIC EXERCISES: CPT

## 2019-11-12 PROCEDURE — 97112 NEUROMUSCULAR REEDUCATION: CPT

## 2019-11-12 PROCEDURE — 97116 GAIT TRAINING THERAPY: CPT

## 2019-11-12 PROCEDURE — 97530 THERAPEUTIC ACTIVITIES: CPT

## 2019-11-14 ENCOUNTER — HOSPITAL ENCOUNTER (OUTPATIENT)
Dept: PHYSICAL THERAPY | Age: 49
Setting detail: THERAPIES SERIES
Discharge: HOME | End: 2019-11-14
Payer: COMMERCIAL

## 2019-11-14 ENCOUNTER — HOSPITAL ENCOUNTER (OUTPATIENT)
Dept: OCCUPATIONAL THERAPY | Age: 49
Setting detail: THERAPIES SERIES
Discharge: HOME | End: 2019-11-14
Payer: COMMERCIAL

## 2019-11-14 PROCEDURE — 97110 THERAPEUTIC EXERCISES: CPT

## 2019-11-14 PROCEDURE — 97112 NEUROMUSCULAR REEDUCATION: CPT

## 2019-11-14 PROCEDURE — 97535 SELF CARE MNGMENT TRAINING: CPT

## 2019-11-14 PROCEDURE — 97032 APPL MODALITY 1+ESTIM EA 15: CPT

## 2019-11-19 ENCOUNTER — HOSPITAL ENCOUNTER (OUTPATIENT)
Dept: PHYSICAL THERAPY | Age: 49
Setting detail: THERAPIES SERIES
Discharge: HOME | End: 2019-11-19
Payer: COMMERCIAL

## 2019-11-19 ENCOUNTER — HOSPITAL ENCOUNTER (OUTPATIENT)
Dept: OCCUPATIONAL THERAPY | Age: 49
Setting detail: THERAPIES SERIES
Discharge: HOME | End: 2019-11-19
Payer: COMMERCIAL

## 2019-11-19 PROCEDURE — 97535 SELF CARE MNGMENT TRAINING: CPT

## 2019-11-19 PROCEDURE — 97112 NEUROMUSCULAR REEDUCATION: CPT

## 2019-11-19 PROCEDURE — 97032 APPL MODALITY 1+ESTIM EA 15: CPT

## 2019-11-19 PROCEDURE — 97116 GAIT TRAINING THERAPY: CPT

## 2019-11-19 PROCEDURE — 97110 THERAPEUTIC EXERCISES: CPT

## 2019-11-19 PROCEDURE — 97530 THERAPEUTIC ACTIVITIES: CPT

## 2019-11-21 ENCOUNTER — HOSPITAL ENCOUNTER (OUTPATIENT)
Dept: PHYSICAL THERAPY | Age: 49
Setting detail: THERAPIES SERIES
Discharge: HOME | End: 2019-11-21
Payer: COMMERCIAL

## 2019-11-21 ENCOUNTER — HOSPITAL ENCOUNTER (OUTPATIENT)
Dept: OCCUPATIONAL THERAPY | Age: 49
Setting detail: THERAPIES SERIES
Discharge: HOME | End: 2019-11-21
Payer: COMMERCIAL

## 2019-11-21 PROCEDURE — 97112 NEUROMUSCULAR REEDUCATION: CPT

## 2019-11-21 PROCEDURE — 97032 APPL MODALITY 1+ESTIM EA 15: CPT

## 2019-11-21 PROCEDURE — 97530 THERAPEUTIC ACTIVITIES: CPT

## 2019-11-26 ENCOUNTER — HOSPITAL ENCOUNTER (OUTPATIENT)
Dept: OCCUPATIONAL THERAPY | Age: 49
Setting detail: THERAPIES SERIES
Discharge: HOME | End: 2019-11-26
Payer: COMMERCIAL

## 2019-11-26 ENCOUNTER — HOSPITAL ENCOUNTER (OUTPATIENT)
Dept: PHYSICAL THERAPY | Age: 49
Setting detail: THERAPIES SERIES
Discharge: HOME | End: 2019-11-26
Payer: COMMERCIAL

## 2019-11-26 PROCEDURE — 97110 THERAPEUTIC EXERCISES: CPT

## 2019-11-26 PROCEDURE — 97116 GAIT TRAINING THERAPY: CPT

## 2019-11-26 PROCEDURE — 97112 NEUROMUSCULAR REEDUCATION: CPT

## 2019-11-26 PROCEDURE — 97032 APPL MODALITY 1+ESTIM EA 15: CPT

## 2019-11-28 ENCOUNTER — APPOINTMENT (OUTPATIENT)
Dept: PHYSICAL THERAPY | Age: 49
End: 2019-11-28
Payer: COMMERCIAL

## 2019-12-03 ENCOUNTER — HOSPITAL ENCOUNTER (OUTPATIENT)
Dept: PHYSICAL THERAPY | Age: 49
Setting detail: THERAPIES SERIES
Discharge: HOME | End: 2019-12-03
Payer: COMMERCIAL

## 2019-12-03 ENCOUNTER — HOSPITAL ENCOUNTER (OUTPATIENT)
Dept: OCCUPATIONAL THERAPY | Age: 49
Setting detail: THERAPIES SERIES
Discharge: HOME | End: 2019-12-03
Payer: COMMERCIAL

## 2019-12-03 PROCEDURE — 97110 THERAPEUTIC EXERCISES: CPT

## 2019-12-03 PROCEDURE — 97112 NEUROMUSCULAR REEDUCATION: CPT

## 2019-12-03 PROCEDURE — 97032 APPL MODALITY 1+ESTIM EA 15: CPT

## 2019-12-03 PROCEDURE — 97116 GAIT TRAINING THERAPY: CPT

## 2019-12-05 ENCOUNTER — HOSPITAL ENCOUNTER (OUTPATIENT)
Dept: PHYSICAL THERAPY | Age: 49
Setting detail: THERAPIES SERIES
Discharge: HOME | End: 2019-12-05
Payer: COMMERCIAL

## 2019-12-05 ENCOUNTER — HOSPITAL ENCOUNTER (OUTPATIENT)
Dept: OCCUPATIONAL THERAPY | Age: 49
Setting detail: THERAPIES SERIES
Discharge: HOME | End: 2019-12-05
Payer: COMMERCIAL

## 2019-12-05 PROCEDURE — 97535 SELF CARE MNGMENT TRAINING: CPT

## 2019-12-05 PROCEDURE — 97110 THERAPEUTIC EXERCISES: CPT

## 2019-12-05 PROCEDURE — 97032 APPL MODALITY 1+ESTIM EA 15: CPT

## 2019-12-05 PROCEDURE — 97112 NEUROMUSCULAR REEDUCATION: CPT

## 2019-12-05 PROCEDURE — 97116 GAIT TRAINING THERAPY: CPT

## 2019-12-10 ENCOUNTER — HOSPITAL ENCOUNTER (OUTPATIENT)
Dept: PHYSICAL THERAPY | Age: 49
Setting detail: THERAPIES SERIES
Discharge: HOME | End: 2019-12-10
Payer: COMMERCIAL

## 2019-12-10 ENCOUNTER — HOSPITAL ENCOUNTER (OUTPATIENT)
Dept: OCCUPATIONAL THERAPY | Age: 49
Setting detail: THERAPIES SERIES
Discharge: HOME | End: 2019-12-10
Payer: COMMERCIAL

## 2019-12-10 PROCEDURE — 97110 THERAPEUTIC EXERCISES: CPT

## 2019-12-10 PROCEDURE — 97116 GAIT TRAINING THERAPY: CPT

## 2019-12-10 PROCEDURE — 97112 NEUROMUSCULAR REEDUCATION: CPT

## 2019-12-10 PROCEDURE — 97032 APPL MODALITY 1+ESTIM EA 15: CPT

## 2019-12-12 ENCOUNTER — HOSPITAL ENCOUNTER (OUTPATIENT)
Dept: OCCUPATIONAL THERAPY | Age: 49
Setting detail: THERAPIES SERIES
Discharge: HOME | End: 2019-12-12
Payer: COMMERCIAL

## 2019-12-12 ENCOUNTER — HOSPITAL ENCOUNTER (OUTPATIENT)
Dept: PHYSICAL THERAPY | Age: 49
Setting detail: THERAPIES SERIES
Discharge: HOME | End: 2019-12-12
Payer: COMMERCIAL

## 2019-12-12 PROCEDURE — 97110 THERAPEUTIC EXERCISES: CPT

## 2019-12-12 PROCEDURE — 97116 GAIT TRAINING THERAPY: CPT

## 2019-12-12 PROCEDURE — 97032 APPL MODALITY 1+ESTIM EA 15: CPT

## 2019-12-12 PROCEDURE — 97112 NEUROMUSCULAR REEDUCATION: CPT

## 2019-12-17 ENCOUNTER — HOSPITAL ENCOUNTER (OUTPATIENT)
Dept: PHYSICAL THERAPY | Age: 49
Setting detail: THERAPIES SERIES
Discharge: HOME | End: 2019-12-17
Payer: COMMERCIAL

## 2019-12-17 ENCOUNTER — HOSPITAL ENCOUNTER (OUTPATIENT)
Dept: OCCUPATIONAL THERAPY | Age: 49
Setting detail: THERAPIES SERIES
Discharge: HOME | End: 2019-12-17
Payer: COMMERCIAL

## 2019-12-17 PROCEDURE — 97535 SELF CARE MNGMENT TRAINING: CPT

## 2019-12-17 PROCEDURE — 97032 APPL MODALITY 1+ESTIM EA 15: CPT

## 2019-12-17 PROCEDURE — 97110 THERAPEUTIC EXERCISES: CPT

## 2019-12-17 PROCEDURE — 97112 NEUROMUSCULAR REEDUCATION: CPT

## 2019-12-17 PROCEDURE — 97116 GAIT TRAINING THERAPY: CPT

## 2019-12-19 ENCOUNTER — HOSPITAL ENCOUNTER (OUTPATIENT)
Dept: OCCUPATIONAL THERAPY | Age: 49
Setting detail: THERAPIES SERIES
Discharge: HOME | End: 2019-12-19
Payer: COMMERCIAL

## 2019-12-19 ENCOUNTER — HOSPITAL ENCOUNTER (OUTPATIENT)
Dept: PHYSICAL THERAPY | Age: 49
Setting detail: THERAPIES SERIES
Discharge: HOME | End: 2019-12-19
Payer: COMMERCIAL

## 2019-12-19 PROCEDURE — 97112 NEUROMUSCULAR REEDUCATION: CPT

## 2019-12-19 PROCEDURE — 97110 THERAPEUTIC EXERCISES: CPT

## 2019-12-19 PROCEDURE — 97535 SELF CARE MNGMENT TRAINING: CPT

## 2019-12-19 PROCEDURE — 97530 THERAPEUTIC ACTIVITIES: CPT

## 2019-12-19 PROCEDURE — 97116 GAIT TRAINING THERAPY: CPT

## 2019-12-24 ENCOUNTER — APPOINTMENT (OUTPATIENT)
Dept: PHYSICAL THERAPY | Age: 49
End: 2019-12-24
Payer: COMMERCIAL

## 2019-12-26 ENCOUNTER — HOSPITAL ENCOUNTER (OUTPATIENT)
Dept: PHYSICAL THERAPY | Age: 49
Setting detail: THERAPIES SERIES
Discharge: HOME | End: 2019-12-26
Payer: COMMERCIAL

## 2019-12-26 ENCOUNTER — HOSPITAL ENCOUNTER (OUTPATIENT)
Dept: OCCUPATIONAL THERAPY | Age: 49
Setting detail: THERAPIES SERIES
Discharge: HOME | End: 2019-12-26
Payer: COMMERCIAL

## 2019-12-26 PROCEDURE — 97112 NEUROMUSCULAR REEDUCATION: CPT

## 2019-12-26 PROCEDURE — 97116 GAIT TRAINING THERAPY: CPT

## 2019-12-26 PROCEDURE — 97032 APPL MODALITY 1+ESTIM EA 15: CPT

## 2019-12-26 PROCEDURE — 97110 THERAPEUTIC EXERCISES: CPT

## 2019-12-26 PROCEDURE — 97535 SELF CARE MNGMENT TRAINING: CPT

## 2020-01-02 ENCOUNTER — HOSPITAL ENCOUNTER (OUTPATIENT)
Dept: PHYSICAL THERAPY | Age: 50
Setting detail: THERAPIES SERIES
Discharge: HOME | End: 2020-01-02
Payer: COMMERCIAL

## 2020-01-02 ENCOUNTER — HOSPITAL ENCOUNTER (OUTPATIENT)
Dept: OCCUPATIONAL THERAPY | Age: 50
Setting detail: THERAPIES SERIES
Discharge: HOME | End: 2020-01-02
Payer: COMMERCIAL

## 2020-01-02 PROCEDURE — 97116 GAIT TRAINING THERAPY: CPT

## 2020-01-02 PROCEDURE — 97112 NEUROMUSCULAR REEDUCATION: CPT

## 2020-01-02 PROCEDURE — 97110 THERAPEUTIC EXERCISES: CPT

## 2020-01-02 PROCEDURE — 97032 APPL MODALITY 1+ESTIM EA 15: CPT

## 2020-01-02 NOTE — PROGRESS NOTES
Occupational Therapy      Occupational Therapy Daily Treatment Note    Date:  2020    Patient Name:  Nathalie Schafer     :  1970  MRN: [id number]    Restrictions/Precautions:    Medical/Treatment Diagnosis Information:  · Diagnosis: I69.359 R cerebral infarction  ·   Insurance/Certification information:  OT Insurance Information: Medical [place]  Physician Information:  Referring Practitioner: Dr. Fernando Gallegos of care signed (Y/N):  Y  Visit# / total visits:  , ,    Pain level: 0/10 L shoulder     G-Code (if applicable):      Date / Visit # G-Code Applied:  /    QuickDASH Total Score: 29 (19)       QuickDASH Disability/Symptom Score : 40.91 % (19 1294)     Progress Note: [x]  Yes  []  No  Next due by: Visit #10      Subjective: Pt reports no significant changes.      Objective Measures:  Eval 10/22  ADL  Feeding: Setup(to cut food)  Grooming: Minimal assistance(assist for hair)  UE Bathing: Setup(assist for managing L UE)  LE Bathing: Minimal assistance(occssionally assist for B feet and lower leg)  UE Dressing: Minimal assistance(assist for all buttons, ties, zippers, etc)  LE Dressing: Minimal assistance  Toileting: Independent  Tone RUE  RUE Tone: Normotonic  Tone LUE  LUE Tone: Hypotonic  Coordination  Movements Are Fluid And Coordinated: No(L UE)  Coordination and Movement description: Fine motor impairments;Gross motor impairments  Functional Activity Tolerance  Functional Activity Tolerance: Endurance does not limit participation in activity  Balance  Sitting Balance: Supervision  Standing Balance: Stand by assistance(quad cane)  Functional Mobility  Functional - Mobility Device: Other(quad cane)  Assist Level: Stand by assistance  Cognition  Overall Cognitive Status: WFL  Sensation  Overall Sensation Status: Impaired(dull/tingling in L UE)  LUE PROM (degrees)  LUE PROM: Exceptions  LUE General PROM: WFL although shoulder ROM limited by pain   L Shoulder Flex  0-180: ~50 degrees  L Exceptions  Left Hand General AROM: WFL for completing fist but unable to complete extension  RUE PROM (degrees)  RUE PROM: WF  RUE AROM (degrees)  RUE AROM : WFL  Right Hand PROM (degrees)  Right Hand PROM: WFL  Right Hand AROM (degrees)  Right Hand AROM: WFL  LUE Strength  Gross LUE Strength: Exceptions to [facility] Shoulder Flex: 2-/5  L Shoulder Ext: 2-/5  L Shoulder ABduction: 2-/5  L Shoulder ADduction: 2-/5  L Shoulder Int Rotation: 1/5  L Shoulder Ext Rotation: 1/5  L Elbow Flex: 2+/5  L Elbow Ext: 1/5  L Forearm Pron: 1/5  L Forearm Sup: 1/5  L Wrist Flex: 1/5  L Wrist Ext: 2+/5  L Hand General: 3-/5  RUE Strength  Gross RUE Strength: WFL  Hand Dominance  Hand Dominance: Right  Left Hand Strength -  (lbs)  Handle Setting 2: 7, 8, 7  Hand Assessment Comment  Hand Assessment Comment: Pt only able to complete gross grasp; unable to complete opposition or finger extension                                   Therapeutic Activities:      Therapeutic Exercise:   Exercise/Equipment  Resistance/Repetitions   Other comments   AAROM/PROM x15 reps each; supine/seated    Shoulder shrugs    Scapular squeezes    Shoulder:  Flexion/extension  AB/AD    Chest press (use of pole)    Elbow:  Flexion/extension    Forearm: x2 sets  Pronation/supination    Wrist: x2 sets  Flexion/extension     Hand:  Grasp/release   Increased difficulty completing shoulder flexion this date    Increased ability to complete with cane and assist from R UE   Hand exercises  Pt with significant difficulty completing hand/finger extension   Weight bearing In stance weight bearing through bilateral UE; completed x10 side to side    Pt completed x10 mini push ups with assist for supporting L UE; completed with x5 second hold; x2 sets   Pt with good ability to complete    Pt needing assist to keep L hand placed on mat    Support at L elbow due to  shoulder pain    Pt reporting increased pain in L wrist this date with wrist extension therefore wedge used during weight bearing to decreased wrist extension   Brace       Activity          Pt with significant difficulty reaching out in front along with completing finger and hand extension during release; Pt supported at hand and elbow during task; needing Max A to complete    Min cues for compensation with body        Pt with moderate ability to complete    Min pain noted in anterior shoulder   ADL Practiced donning and doffing of [employer] sling independently; pt able to doff with good ability; pt needing Min A and cues to don; Pt able to complete with:  1. Threading hand  2. Getting around elbow  3. Pulling up L UE  4. Threading R UE    Pt able to don with no physical assist x1                              E-stim  Complete wrist/finger extension with use of e-stim; pt instructed to complete tip tip pinch during \"off\" time; pt able to  small wooden blocks and stackCompleted grasp/release and finger opposition during E-stim for functional use of L hand      Home Exercise Program:    · Pt issued handout for AROM/PROM for L UE to be completed in supine/seated  · Weight bearing through hand and elbow  · Grasp release with yellow theraputty (yellow theraputty issued)  · Table slides    Manual Treatments:      Modalities:  E stim;  wrist and hand extension (100 bps, 10/10 cycle time, 31mA CC).    Goals  Short term goals  Time Frame for Short term goals: 4-6 weeks  Short term goal 1: increase all shoulder AROM by 10 degrees to increase ability to complete ADLs (progressing 12/19)  Short term goal 2: increase elbow AROM by 10 degrees for increased ability to complete household chores (Goal met 11/19) Update goal to additional 10 degrees; (Goal met 12/19) Update goal to additional 10 degrees  Short term goal 3: increase L hand  strength by 10# to increased ability to grasp objects during ADLs  (progressing 12/19)  Short term goal 4: Pt will decrease overall QuickDASH score by 5 points  (progressing

## 2020-01-07 ENCOUNTER — HOSPITAL ENCOUNTER (OUTPATIENT)
Dept: PHYSICAL THERAPY | Age: 50
Setting detail: THERAPIES SERIES
Discharge: HOME | End: 2020-01-07
Payer: COMMERCIAL

## 2020-01-07 ENCOUNTER — HOSPITAL ENCOUNTER (OUTPATIENT)
Dept: OCCUPATIONAL THERAPY | Age: 50
Setting detail: THERAPIES SERIES
Discharge: HOME | End: 2020-01-07
Payer: COMMERCIAL

## 2020-01-07 PROCEDURE — 97110 THERAPEUTIC EXERCISES: CPT

## 2020-01-07 PROCEDURE — 97032 APPL MODALITY 1+ESTIM EA 15: CPT

## 2020-01-07 PROCEDURE — 97112 NEUROMUSCULAR REEDUCATION: CPT

## 2020-01-07 PROCEDURE — 97535 SELF CARE MNGMENT TRAINING: CPT

## 2020-01-07 NOTE — PROGRESS NOTES
Physical Therapy  Daily Treatment Note  Date: 2020  Patient Name: Felicita Aviles  MRN: [id number]     :   1970    Subjective:   General  Chart Reviewed: Yes  Additional Pertinent Hx: Pt is a 52 y.o. F. beginning OP PT s/p thrombotic right MCA stroke in deep right corona radiata area which occured in early . Pt completed a bout of therapy at [facility], then discharged to her mother's home. She returns to therapy to address L-sided weakness, and gait instability. PMH includes HTN. Response To Previous Treatment: Patient with no complaints from previous session. Family / Caregiver Present: Yes  Referring Practitioner: Dr. Ger Holland  PT Visit Information  Onset Date: 19  PT Insurance Information: Medical [place]  Total # of Visits Approved: 24  Total # of Visits to Date: 18  Subjective  Subjective: Pt agreeable to activity. [place]. Treatment Activities:     Exercises  Exercise 4: Forward/Backward lunge 15' x 2, CGA, cues to increase step length, no LOB. Exercise 5: Lateral lunge L/R 10' x 2 trials, max cues to increase hip/knee flexion, and maintain trunk extension. Limited d/t fatigue, no LOB. Exercise 6: Sustained squat 2 x 30 s., no UE support, cues for posture. Exercise 7: Side-lying:  LLE clamshell 2 x 10, cues for technique, Min A for pelvic stabilization. Exercise 8: Side-lying: L hip ABD AAROM x 10, isometric x 10. L hip [place] (clockwise) x 10, max cues, min A to facilitate. Exercise 9: (B) ankle DF stretch x 3 min. (incline). Exercise 10: L ankle PF 2 x 15 with 90 lb. resistance, 2 x 10 with 100 lb. resistance. Exercise 11: Barefoot: RLE SLS 3 x 10 s. hold, CGA, no LOB. LLE SLS 3 x 5 s., Min-CGA to ensure trunk control. Exercise 12: Barefoot on Airex:  A/P weight shift x 10 EO, x 10 EC, CGA, no LOB. A/P weight shift x 10 EO, x 10 EC, SBA, no LOB.       Assessment:   Conditions Requiring Skilled 12/19. Long term goal 2: Improve L knee Flex strength to 4/5, hip ABD strength to 4+/5, and L ankle PF strength to 4/5 to facilitate improved independence ambulating without cane - not met 11/21, or 12/19  Long term goal 3: Ambulate at least 1500' over even/uneven surfaces with LRAD, mod I, to demonstrate independence performing community ambulation - not met 11/21, or 12/19  Long term goal 4: Score at least 54/56 on the Mcmahon Balance Scale to demonstrate improved balance - met 11/21 progress goal score to 56/56. -met 12/19. Patient Goals   Patient goals : To walk without a cane, and get back to driving    Plan:    Plan  Times per week: 2x  Plan weeks: 3 weeks  Specific instructions for Next Treatment: Improve L hip, knee, and ankle strength; Increase L knee and ankle ROM; Improve balance; Practice ambulation without cane  Current Treatment Recommendations: Strengthening, ROM, Balance Training, Functional Mobility Training, Transfer Training, Modalities, Positioning, Home Exercise Program, Safety Education & Training, Patient/Caregiver Education & Training, Equipment Evaluation, Education, & procurement, Gait Training, Stair training, Manual Therapy - Joint Manipulation, Neuromuscular Re-education, Manual Therapy - Soft Tissue Mobilization, Endurance Training  Safety Devices  Type of devices:  All fall risk precautions in place, Gait belt  Restraints  Initially in place: No  Timed Code Treatment Minutes: 40 Minutes     Therapy Time   Individual Concurrent Group Co-treatment   Time In 1430         Time Out 1515         Minutes 45         Timed Code Treatment Minutes: 40 Minutes   Therapeutic exercise x 2 charges  Neuro facilitation x 1 charge    Kurtis Negrete PT    Electronically signed by Kurtis Negrete PT [id number] on 1/7/2020 at 4:57 PM

## 2020-01-07 NOTE — PROGRESS NOTES
Occupational Therapy      Occupational Therapy Daily Treatment Note    Date:  2020    Patient Name:  Jose Mack     :  1970  MRN: [id number]    Restrictions/Precautions:    Medical/Treatment Diagnosis Information:  · Diagnosis: I69.359 R cerebral infarction  ·   Insurance/Certification information:  OT Insurance Information: [employer] [place]  Physician Information:  Referring Practitioner: Dr. Lennis Dancer of care signed (Y/N):  Y  Visit# / total visits:  , , 3/8   Pain level: 0/10 L shoulder     G-Code (if applicable):      Date / Visit # G-Code Applied:  /    QuickDASH Total Score: 29 (19)       QuickDASH Disability/Symptom Score : 40.91 % (19 6304)     Progress Note: [x]  Yes  []  No  Next due by: Visit #10      Subjective: Pt reports no significant changes. Pt reports she continues to complete HEP daily.      Objective Measures:  Eval 10/22  ADL  Feeding: Setup(to cut food)  Grooming: Minimal assistance(assist for hair)  UE Bathing: Setup(assist for managing L UE)  LE Bathing: Minimal assistance(occssionally assist for B feet and lower leg)  UE Dressing: Minimal assistance(assist for all buttons, ties, zippers, etc)  LE Dressing: Minimal assistance  Toileting: Independent  Tone RUE  RUE Tone: Normotonic  Tone LUE  LUE Tone: Hypotonic  Coordination  Movements Are Fluid And Coordinated: No(L UE)  Coordination and Movement description: Fine motor impairments;Gross motor impairments  Functional Activity Tolerance  Functional Activity Tolerance: Endurance does not limit participation in activity  Balance  Sitting Balance: Supervision  Standing Balance: Stand by assistance(quad cane)  Functional Mobility  Functional - Mobility Device: Other(quad cane)  Assist Level: Stand by assistance  Cognition  Overall Cognitive Status: WFL  Sensation  Overall Sensation Status: Impaired(dull/tingling in L UE)  LUE PROM (degrees)  LUE PROM: Exceptions  LUE General PROM: WFL although shoulder ROM limited by pain   L Shoulder Flex  0-180: ~50 degrees  L Shoulder ABduction 0-180: ~70 degrees  LUE AROM (degrees)  LUE AROM : Exceptions  LUE General AROM: pt with noted muscle activation in shoulder, elbow, wrist, and hand  L Shoulder Flexion 0-180: 0  L Shoulder Extension 0-45: 0  L Shoulder ABduction 0-180: 0  L Shoulder Int Rotation  0-70: 0  L Shoulder Ext Rotation  0-90: 0  L Elbow Flexion 0-145: 25  L Elbow Extension 145-0: 0  L Forearm Pron 0-90: 0  L Forearm Supination  0-90: 0  L Wrist Flexion 0-80: 0  L Wrist Extension 0-70: 10   Left Hand PROM (degrees)  Left Hand PROM: WFL  Left Hand AROM (degrees)  Left Hand AROM: Exceptions  Left Hand General AROM: [facility] for completing fist but unable to complete extension  RUE PROM (degrees)  RUE PROM: WF  RUE AROM (degrees)  RUE AROM : WFL  Right Hand PROM (degrees)  Right Hand PROM: WFL  Right Hand AROM (degrees)  Right Hand AROM: WFL  LUE Strength  Gross LUE Strength: Exceptions to [facility]  L Shoulder Flex: 2-/5  L Shoulder Ext: 2-/5  L Shoulder ABduction: 2-/5  L Shoulder ADduction: 2-/5  L Shoulder Int Rotation: 1/5  L Shoulder Ext Rotation: 1/5  L Elbow Flex: 2+/5  L Elbow Ext: 1/5  L Forearm Pron: 1/5  L Forearm Sup: 1/5  L Wrist Flex: 1/5  L Wrist Ext: 2+/5  L Hand General: 3-/5  RUE Strength  Gross RUE Strength: WFL  Hand Dominance  Hand Dominance: Right  Left Hand Strength -  (lbs)  Handle Setting 2: 5, 5, 5  Hand Assessment Comment  Hand Assessment Comment: Pt only able to complete gross grasp; unable to complete opposition or finger extension 11/19  ADL  Feeding: Setup(to cut food)  Grooming: Minimal assistance(assist for hair)  UE Bathing: Setup(assist for managing L UE)  LE Bathing: Minimal assistance(occssionally assist for B feet and lower leg)  UE Dressing: Minimal assistance(assist for all buttons, ties, zippers, etc)  LE Dressing: Minimal assistance  Toileting: Independent  Tone RUE  RUE Tone: Normotonic  Tone LUE  LUE Tone: Hypotonic  Coordination  Movements Are Fluid And Coordinated: No(L UE)  Coordination and Movement description: Fine motor impairments;Gross motor impairments  Functional Activity Tolerance  Functional Activity Tolerance: Endurance does not limit participation in activity  Balance  Sitting Balance: Supervision  Standing Balance: Stand by assistance(quad cane)  Functional Mobility  Functional - Mobility Device: Other(quad cane)  Assist Level: Stand by assistance  Cognition  Overall Cognitive Status: WFL  Sensation  Overall Sensation Status: Impaired(dull/tingling in L UE)  LUE PROM (degrees)  LUE PROM: Exceptions  LUE General PROM: WFL although shoulder ROM limited by pain   L Shoulder Flex  0-180: ~50 degrees  L Shoulder ABduction 0-180: ~70 degrees  LUE AROM (degrees)  LUE AROM : Exceptions  LUE General AROM: pt with noted muscle activation in shoulder, elbow, wrist, and hand  L Shoulder Flexion 0-180: 10  L Shoulder Extension 0-45: 0  L Shoulder ABduction 0-180:10  L Shoulder Int Rotation  0-70: 0  L Shoulder Ext Rotation  0-90: 0  L Elbow Flexion 0-145: 40* up to (90* seated)  L Elbow Extension 145-0: 145*  L Forearm Pron 0-90: 90  L Forearm Supination  0-90: 30  L Wrist Flexion 0-80: 0  L Wrist Extension 0-70: 35* to neutral   Left Hand PROM (degrees)  Left Hand PROM: WFL  Left Hand AROM (degrees)  Left Hand AROM: Exceptions  Left Hand General AROM: WFL for completing fist but unable to complete extension  RUE PROM (degrees)  RUE PROM: WFL  RUE AROM (degrees)  RUE AROM : WFL  Right Hand PROM (degrees)  Right Hand PROM: WFL  Right Hand AROM (degrees)  Right Hand AROM: WFL  LUE Strength  Gross LUE Strength: Exceptions to [facility]  L Shoulder Flex: 2-/5  L Shoulder Ext: 2-/5  L Shoulder ABduction: 2-/5  L Shoulder ADduction: 2-/5  L Shoulder Int Rotation: 1/5  L Shoulder Ext Rotation: 1/5  L Elbow Flex: 2+/5  L Elbow Ext: 1/5  L Forearm Pron: 1/5  L Forearm Sup: 1/5  L Wrist Flex: 1/5  L Wrist Ext: 2+/5  L Hand General: 3-/5  RUE Strength  Gross RUE Strength: WFL  Hand Dominance  Hand Dominance: Right  Left Hand Strength -  (lbs)  Handle Setting 2: 5, 5, 5  Hand Assessment Comment  Hand Assessment Comment: Pt only able to complete gross grasp; unable to complete opposition or finger extension 12/19  ADL  Feeding: Setup(to cut food)  Grooming: Minimal assistance(assist for hair)  UE Bathing: Setup(assist for managing L UE)  LE Bathing: Minimal assistance(occssionally assist for B feet and lower leg)  UE Dressing: Minimal assistance(assist for all buttons, ties, zippers, etc)  LE Dressing: Minimal assistance  Toileting: Independent  Tone RUE  RUE Tone: Normotonic  Tone LUE  LUE Tone: Hypotonic  Coordination  Movements Are Fluid And Coordinated: No(L UE)  Coordination and Movement description: Fine motor impairments;Gross motor impairments  Functional Activity Tolerance  Functional Activity Tolerance: Endurance does not limit participation in activity  Balance  Sitting Balance: Supervision  Standing Balance: Stand by assistance(quad cane)  Functional Mobility  Functional - Mobility Device: Other(quad cane)  Assist Level: Stand by assistance  Cognition  Overall Cognitive Status: WFL  Sensation  Overall Sensation Status: Impaired(dull/tingling in L UE)  LUE PROM (degrees)  LUE PROM: Exceptions  LUE General PROM: WFL although shoulder ROM limited by pain   L Shoulder Flex  0-180: ~50 degrees  L Shoulder ABduction 0-180: ~70 degrees  LUE AROM (degrees)  LUE AROM : Exceptions  LUE General AROM: pt with noted muscle activation in shoulder, elbow, wrist, and hand  L Shoulder Flexion 0-180: 15  L Shoulder Extension 0-45: 0  L Shoulder ABduction 0-180: 20  L Shoulder Int Rotation  0-70: 0  L Shoulder Ext Rotation  0-90: 0  L Elbow Flexion 0-145: 120    L Elbow Extension 145-0: 145*  L Forearm Pron 0-90: 90  L Forearm Supination  0-90: 35  L Wrist Flexion 0-80: 5  L Wrist Extension 0-70: 50* to neutral   Left Hand PROM (degrees)  Left Hand PROM: WFL  Left Hand AROM (degrees)  Left Hand AROM: Exceptions  Left Hand General AROM: WFL for completing fist but unable to complete extension  RUE PROM (degrees)  RUE PROM: WFL  RUE AROM (degrees)  RUE AROM : WFL  Right Hand PROM (degrees)  Right Hand PROM: WFL  Right Hand AROM (degrees)  Right Hand AROM: WFL  LUE Strength  Gross LUE Strength: Exceptions to [facility] Shoulder Flex: 2-/5  L Shoulder Ext: 2-/5  L Shoulder ABduction: 2-/5  L Shoulder ADduction: 2-/5  L Shoulder Int Rotation: 1/5  L Shoulder Ext Rotation: 1/5  L Elbow Flex: 2+/5  L Elbow Ext: 1/5  L Forearm Pron: 1/5  L Forearm Sup: 1/5  L Wrist Flex: 1/5  L Wrist Ext: 2+/5  L Hand General: 3-/5  RUE Strength  Gross RUE Strength: WFL  Hand Dominance  Hand Dominance: Right  Left Hand Strength -  (lbs)  Handle Setting 2: 7, 8, 7  Hand Assessment Comment  Hand Assessment Comment: Pt only able to complete gross grasp; unable to complete opposition or finger extension                                   Therapeutic Activities:      Therapeutic Exercise:   Exercise/Equipment  Resistance/Repetitions   Other comments   AAROM/PROM x15 reps each; supine/seated    Shoulder shrugs    Scapular squeezes    Shoulder:  Flexion/extension  AB/AD    Chest press (use of pole)    Elbow:  Flexion/extension    Forearm: x2 sets  Pronation/supination    Wrist: x2 sets  Flexion/extension     Hand:  Grasp/release   Increased difficulty completing shoulder flexion this date    Increased ability to complete with cane and assist from R UE   Hand exercises  Pt with significant difficulty completing hand/finger extension   Weight bearing    Pt with good ability to complete    Pt needing assist to keep L hand placed on mat    Support at L elbow due to  shoulder pain    Pt reporting increased pain in L wrist this date with wrist extension therefore wedge used during weight bearing to decreased wrist extension   Brace       Activity          Pt with significant difficulty reaching out in front along with completing finger and hand extension during release; Pt supported at hand and elbow during task; needing Max A to complete    Min cues for compensation with body        Pt with moderate ability to complete    Min pain noted in anterior shoulder   ADL Practiced donning and doffing of [employer] sling independently; pt able to doff with good ability; pt needing Min A and cues to don; Pt able to complete with:  1. Threading hand  2. Getting around elbow  3. Pulling up L UE  4. Threading R UE    Pt able to don with no physical assist x2    Pt also completing with threading R UE first, then around L elbow, and to L hand. Pt able to complete x2 times. Pt reports this way \"seems\" easier. E-stim        Home Exercise Program:    · Pt issued handout for AROM/PROM for L UE to be completed in supine/seated  · Weight bearing through hand and elbow  · Grasp release with yellow theraputty (yellow theraputty issued)  · Table slides    Manual Treatments:      Modalities:  E stim;  L UE including shoulder flexion and abduction; use of cane for flexion; supine on mat (100 bps, 10/10 cycle time, 31mA CC).    Goals  Short term goals  Time Frame for Short term goals: 4-6 weeks  Short term goal 1: increase all shoulder AROM by 10 degrees to increase ability to complete ADLs (progressing 12/19)  Short term goal 2: increase elbow AROM by 10 degrees for increased ability to complete household chores (Goal met 11/19) Update goal to additional 10 degrees; (Goal met 12/19) Update goal to additional 10 degrees  Short term goal 3: increase L hand  strength by 10# to increased ability to grasp objects during ADLs  (progressing 12/19)  Short term goal 4: Pt will decrease overall QuickDASH score by 5 points  (progressing 11/19)  Short term goal 5: Pt will report <5/10 pain in 3 consecutive session for increased ability to use L UE  (Goal met 12/19)  Long term goals  Time Frame for

## 2020-01-09 ENCOUNTER — HOSPITAL ENCOUNTER (OUTPATIENT)
Dept: OCCUPATIONAL THERAPY | Age: 50
Setting detail: THERAPIES SERIES
Discharge: HOME | End: 2020-01-09
Payer: COMMERCIAL

## 2020-01-09 ENCOUNTER — HOSPITAL ENCOUNTER (OUTPATIENT)
Dept: PHYSICAL THERAPY | Age: 50
Setting detail: THERAPIES SERIES
Discharge: HOME | End: 2020-01-09
Payer: COMMERCIAL

## 2020-01-09 PROCEDURE — 97112 NEUROMUSCULAR REEDUCATION: CPT

## 2020-01-09 PROCEDURE — 97535 SELF CARE MNGMENT TRAINING: CPT

## 2020-01-09 PROCEDURE — 97110 THERAPEUTIC EXERCISES: CPT

## 2020-01-09 PROCEDURE — 97032 APPL MODALITY 1+ESTIM EA 15: CPT

## 2020-01-09 NOTE — PROGRESS NOTES
line on floor at fast pace 2 x 10,  CGA, no LOB. Assessment:   Conditions Requiring Skilled Therapeutic Intervention  Assessment: Pt completed multiple standing balance and LE strengthening exercises today, doing well to participate throughout, but being limited d/t LE fatigue. Her SLS balance continues to improve, and she denies any hx of falls at home. Recommend continued therapy to further improve her strength and balance. Safety Devices  Type of devices: All fall risk precautions in place;Gait belt     Goals:  Short term goals  Time Frame for Short term goals: In 2-4 weeks pt will   Short term goal 1: Improve L knee Flex AROM to 110 degrees (not met 12/19), and L ankle DF AROM to -6 degrees (from neutral PF/DF) (met 12/19) to facilitate improved gait mechanics. Short term goal 2: Improve L knee Flex strength to 3+/5 (met 12/19), hip ABD strength to 4/5 (not met 12/19), and L ankle PF strength to 3/5 (met 12/19) to facilitate improved independence ambulating without cane. Short term goal 3: Ambulate 200' without AD support over level surfaces, independently, to demonstrate improved independence ambulating - met 11/21 - progress to ambulating 600' independently without AD as part of 6 MWT - met 12/19. Short term goal 4: Ascend/Descend 12 steps without UE support, reciprocal pattern, SBA, to demonstrate improved balance - met 11/21 - progress to ascending/descending 12 steps without UE support, reciprocal pattern, independently - met 12/19. Long term goals  Time Frame for Long term goals : In 4-6 weeks pt will perform  Long term goal 1: Improve L knee Flex AROM to 120 degrees, and L ankle DF AROM to 0 degrees (neutral PF/DF) to facilitate improved gait mechanics. - not met 11/21, or 12/19.   Long term goal 2: Improve L knee Flex strength to 4/5, hip ABD strength to 4+/5, and L ankle PF strength to 4/5 to facilitate improved independence ambulating without cane - not met 11/21, or 12/19  Long term goal 3: Ambulate at least 1500' over even/uneven surfaces with LRAD, mod I, to demonstrate independence performing community ambulation - not met 11/21, or 12/19  Long term goal 4: Score at least 54/56 on the Mcmahon Balance Scale to demonstrate improved balance - met 11/21 progress goal score to 56/56. -met 12/19. Patient Goals   Patient goals : To walk without a cane, and get back to driving    Plan:    Plan  Times per week: 2x  Plan weeks: 2.5 weeks  Specific instructions for Next Treatment: Improve L hip, knee, and ankle strength; Increase L knee and ankle ROM; Improve balance; Practice ambulation without cane  Current Treatment Recommendations: Strengthening, ROM, Balance Training, Functional Mobility Training, Transfer Training, Modalities, Positioning, Home Exercise Program, Safety Education & Training, Patient/Caregiver Education & Training, Equipment Evaluation, Education, & procurement, Gait Training, Stair training, Manual Therapy - Joint Manipulation, Neuromuscular Re-education, Manual Therapy - Soft Tissue Mobilization, Endurance Training  Safety Devices  Type of devices:  All fall risk precautions in place, Gait belt  Restraints  Initially in place: No  Timed Code Treatment Minutes: 40 Minutes     Therapy Time   Individual Concurrent Group Co-treatment   Time In 1430         Time Out 1515         Minutes 45         Timed Code Treatment Minutes: 40 Minutes   Therapeutic exercise x 2 charges  Neuro facilitation x 1 charge    Cristian Jon PT    Electronically signed by Cristian Jon, DAMI [id number] on 1/9/2020 at 4:36 PM

## 2020-01-14 ENCOUNTER — HOSPITAL ENCOUNTER (OUTPATIENT)
Dept: OCCUPATIONAL THERAPY | Age: 50
Setting detail: THERAPIES SERIES
Discharge: HOME | End: 2020-01-14
Payer: COMMERCIAL

## 2020-01-14 ENCOUNTER — HOSPITAL ENCOUNTER (OUTPATIENT)
Dept: PHYSICAL THERAPY | Age: 50
Setting detail: THERAPIES SERIES
Discharge: HOME | End: 2020-01-14
Payer: COMMERCIAL

## 2020-01-14 PROCEDURE — 97110 THERAPEUTIC EXERCISES: CPT

## 2020-01-14 PROCEDURE — 97112 NEUROMUSCULAR REEDUCATION: CPT

## 2020-01-14 PROCEDURE — 97032 APPL MODALITY 1+ESTIM EA 15: CPT

## 2020-01-14 NOTE — PROGRESS NOTES
forward hop repeated x 2 trials, cue to synchronize (B) LEs (LLE jumping early), SBA, no LOB. Exercise 19: Forward/backward stepping RLE/LLE over line on floor, fast pace, 2 x 10, cues for LLE sequencing, and to maintain weight on forefoot, no LOB. Exercise 20: Side-stepping R/L 20' at fast pace, cues to increase speed, cues to keep weight on ball of foot, no LOB. Assessment:   Conditions Requiring Skilled Therapeutic Intervention  Body structures, Functions, Activity limitations: Decreased functional mobility ; Decreased ROM; Decreased strength;Decreased balance  Assessment: Pt demonstrated improved LLE single leg balance this session, but remains limited in L ankle PF strength, and hip ABD strength. She would benefit from continued therapy to further address her weakness, and instability. Anticipate 3 more sessions to address these deficits. Exam: Strength; ROM; Balance; Ambulation; Stair climb  Discharge Recommendations: Home with assist PRN;2-3 sessions per week; Outpatient PT  Safety Devices  Type of devices: All fall risk precautions in place;Gait belt     Goals:  Short term goals  Time Frame for Short term goals: In 2-4 weeks pt will   Short term goal 1: Improve L knee Flex AROM to 110 degrees (not met 12/19), and L ankle DF AROM to -6 degrees (from neutral PF/DF) (met 12/19) to facilitate improved gait mechanics. Short term goal 2: Improve L knee Flex strength to 3+/5 (met 12/19), hip ABD strength to 4/5 (not met 12/19), and L ankle PF strength to 3/5 (met 12/19) to facilitate improved independence ambulating without cane. Short term goal 3: Ambulate 200' without AD support over level surfaces, independently, to demonstrate improved independence ambulating - met 11/21 - progress to ambulating 600' independently without AD as part of 6 MWT - met 12/19.   Short term goal 4: Ascend/Descend 12 steps without UE support, reciprocal pattern, SBA, to demonstrate improved balance - met 11/21 - progress to ascending/descending 12 steps without UE support, reciprocal pattern, independently - met 12/19. Long term goals  Time Frame for Long term goals : In 4-6 weeks pt will perform  Long term goal 1: Improve L knee Flex AROM to 120 degrees, and L ankle DF AROM to 0 degrees (neutral PF/DF) to facilitate improved gait mechanics. - not met 11/21, or 12/19. Long term goal 2: Improve L knee Flex strength to 4/5, hip ABD strength to 4+/5, and L ankle PF strength to 4/5 to facilitate improved independence ambulating without cane - not met 11/21, or 12/19  Long term goal 3: Ambulate at least 1500' over even/uneven surfaces with LRAD, mod I, to demonstrate independence performing community ambulation - not met 11/21, or 12/19  Long term goal 4: Score at least 54/56 on the Mcmahon Balance Scale to demonstrate improved balance - met 11/21 progress goal score to 56/56. -met 12/19. Patient Goals   Patient goals : To walk without a cane, and get back to driving    Plan:    Plan  Times per week: 2x  Plan weeks: 1.5 weeks  Specific instructions for Next Treatment: Improve L hip, knee, and ankle strength; Increase L knee and ankle ROM; Improve balance; Practice ambulation without cane  Safety Devices  Type of devices:  All fall risk precautions in place, Gait belt  Restraints  Initially in place: No  Timed Code Treatment Minutes: 40 Minutes     Therapy Time   Individual Concurrent Group Co-treatment   Time In 1430         Time Out 1515         Minutes 45         Timed Code Treatment Minutes: 40 Minutes   Therapeutic exercise x 2 charges  Neuro facilitation x 1 charge    Manan Andrews PT    Electronically signed by Manan Andrews, PT [id number] on 1/14/2020 at 4:14 PM

## 2020-01-14 NOTE — PROGRESS NOTES
Occupational Therapy      Occupational Therapy Daily Treatment Note    Date:  2020    Patient Name:  Felicita Aviles     :  1970  MRN: [id number]    Restrictions/Precautions:    Medical/Treatment Diagnosis Information:  · Diagnosis: I69.359 R cerebral infarction  ·   Insurance/Certification information:  OT Insurance Information: Medical [place]  Physician Information:  Referring Practitioner: Dr. Yazmin Da Silva of care signed (Y/N):  Y  Visit# / total visits:  , ,    Pain level: 0/10 L shoulder     G-Code (if applicable):      Date / Visit # G-Code Applied:  /    QuickDASH Total Score: 27 (20)       QuickDASH Disability/Symptom Score :  36.36% (19)     Progress Note: []  Yes  [x]  No  Next due by: Visit #10      Subjective: Pt reports no significant changes. Pt reports she continues to complete HEP daily. Pt reports improved consistency with moving L UE.      Objective Measures:  Eval 10/22  ADL  Feeding: Setup(to cut food)  Grooming: Minimal assistance(assist for hair)  UE Bathing: Setup(assist for managing L UE)  LE Bathing: Minimal assistance(occssionally assist for B feet and lower leg)  UE Dressing: Minimal assistance(assist for all buttons, ties, zippers, etc)  LE Dressing: Minimal assistance  Toileting: Independent  Tone RUE  RUE Tone: Normotonic  Tone LUE  LUE Tone: Hypotonic  Coordination  Movements Are Fluid And Coordinated: No(L UE)  Coordination and Movement description: Fine motor impairments;Gross motor impairments  Functional Activity Tolerance  Functional Activity Tolerance: Endurance does not limit participation in activity  Balance  Sitting Balance: Supervision  Standing Balance: Stand by assistance(quad cane)  Functional Mobility  Functional - Mobility Device: Other(quad cane)  Assist Level: Stand by assistance  Cognition  Overall Cognitive Status: WFL  Sensation  Overall Sensation Status: Impaired(dull/tingling in L UE)  LUE PROM (degrees)  LUE PROM: Exceptions  LUE General PROM: WFL although shoulder ROM limited by pain   L Shoulder Flex  0-180: ~50 degrees  L Shoulder ABduction 0-180: ~70 degrees  LUE AROM (degrees)  LUE AROM : Exceptions  LUE General AROM: pt with noted muscle activation in shoulder, elbow, wrist, and hand  L Shoulder Flexion 0-180: 0  L Shoulder Extension 0-45: 0  L Shoulder ABduction 0-180: 0  L Shoulder Int Rotation  0-70: 0  L Shoulder Ext Rotation  0-90: 0  L Elbow Flexion 0-145: 25  L Elbow Extension 145-0: 0  L Forearm Pron 0-90: 0  L Forearm Supination  0-90: 0  L Wrist Flexion 0-80: 0  L Wrist Extension 0-70: 10   Left Hand PROM (degrees)  Left Hand PROM: WFL  Left Hand AROM (degrees)  Left Hand AROM: Exceptions  Left Hand General AROM: WFL for completing fist but unable to complete extension  RUE PROM (degrees)  RUE PROM: WF  RUE AROM (degrees)  RUE AROM : WFL  Right Hand PROM (degrees)  Right Hand PROM: WFL  Right Hand AROM (degrees)  Right Hand AROM: WFL  LUE Strength  Gross LUE Strength: Exceptions to [facility]  L Shoulder Flex: 2-/5  L Shoulder Ext: 2-/5  L Shoulder ABduction: 2-/5  L Shoulder ADduction: 2-/5  L Shoulder Int Rotation: 1/5  L Shoulder Ext Rotation: 1/5  L Elbow Flex: 2+/5  L Elbow Ext: 1/5  L Forearm Pron: 1/5  L Forearm Sup: 1/5  L Wrist Flex: 1/5  L Wrist Ext: 2+/5  L Hand General: 3-/5  RUE Strength  Gross RUE Strength: WFL  Hand Dominance  Hand Dominance: Right  Left Hand Strength -  (lbs)  Handle Setting 2: 5, 5, 5  Hand Assessment Comment  Hand Assessment Comment: Pt only able to complete gross grasp; unable to complete opposition or finger extension 12/19  ADL  Feeding: Setup(to cut food)  Grooming: Minimal assistance(assist for hair)  UE Bathing: Setup(assist for managing L UE)  LE Bathing: Minimal assistance(occssionally assist for B feet and lower leg)  UE Dressing: Minimal assistance(assist for all buttons, ties, zippers, etc)  LE Dressing: Minimal assistance  Toileting: Independent  Tone RUE  RUE Tone: Normotonic  Tone LUE  LUE Tone: Hypotonic  Coordination  Movements Are Fluid And Coordinated: No(L UE)  Coordination and Movement description: Fine motor impairments;Gross motor impairments  Functional Activity Tolerance  Functional Activity Tolerance: Endurance does not limit participation in activity  Balance  Sitting Balance: Supervision  Standing Balance: Stand by assistance(quad cane)  Functional Mobility  Functional - Mobility Device: Other(quad cane)  Assist Level: Stand by assistance  Cognition  Overall Cognitive Status: WFL  Sensation  Overall Sensation Status: Impaired(dull/tingling in L UE)  LUE PROM (degrees)  LUE PROM: Exceptions  LUE General PROM: WFL although shoulder ROM limited by pain   L Shoulder Flex  0-180: ~50 degrees  L Shoulder ABduction 0-180: ~70 degrees  LUE AROM (degrees)  LUE AROM : Exceptions  LUE General AROM: pt with noted muscle activation in shoulder, elbow, wrist, and hand  L Shoulder Flexion 0-180: 15  L Shoulder Extension 0-45: 0  L Shoulder ABduction 0-180: 20  L Shoulder Int Rotation  0-70: 0  L Shoulder Ext Rotation  0-90: 0  L Elbow Flexion 0-145: 120    L Elbow Extension 145-0: 145*  L Forearm Pron 0-90: 90  L Forearm Supination  0-90: 35  L Wrist Flexion 0-80: 5  L Wrist Extension 0-70: 50* to neutral   Left Hand PROM (degrees)  Left Hand PROM: WFL  Left Hand AROM (degrees)  Left Hand AROM: Exceptions  Left Hand General AROM: WFL for completing fist but unable to complete extension  RUE PROM (degrees)  RUE PROM: WFL  RUE AROM (degrees)  RUE AROM : WFL  Right Hand PROM (degrees)  Right Hand PROM: WFL  Right Hand AROM (degrees)  Right Hand AROM: WFL  LUE Strength  Gross LUE Strength: Exceptions to [facility]  L Shoulder Flex: 2-/5  L Shoulder Ext: 2-/5  L Shoulder ABduction: 2-/5  L Shoulder ADduction: 2-/5  L Shoulder Int Rotation: 1/5  L Shoulder Ext Rotation: 1/5  L Elbow Flex: 2+/5  L Elbow Ext: 1/5  L Forearm Pron: 1/5  L Forearm Sup: 1/5  L Wrist Flex: 1/5  L Wrist Ext: this date with wrist extension therefore wedge used during weight bearing to decreased wrist extension   Brace       Activity          Pt with significant difficulty reaching out in front along with completing finger and hand extension during release; Pt supported at hand and elbow during task; needing Max A to complete    Min cues for compensation with body        Pt with moderate ability to complete    Min pain noted in anterior shoulder   ADL                               E-stim  Complete wrist/finger extension with use of e-stim; pt instructed to complete tip tip pinch during \"off\" timeCompleted wrist and finger extension with assist from E-stim coupled with elbow flexion/extension and pronation/supination and elbow flexion/extension during \"off\" time in order to combine purposeful movements for ADLs/IADLs      Home Exercise Program:    · Pt issued handout for AROM/PROM for L UE to be completed in supine/seated  · Weight bearing through hand and elbow  · Grasp release with yellow theraputty (yellow theraputty issued)  · Table slides  · Donning/doff sling  · Self feeding with use of L UE    Manual Treatments:      Modalities:  E stim;  L UE including wrist and hand extension; coupled with finger opposition to 2nd and 3rd digit and grasp and release (100 bps, 10/10 cycle time, 31mA CC).    Goals  Short term goals  Time Frame for Short term goals: 4-6 weeks  Short term goal 1: increase all shoulder AROM by 10 degrees to increase ability to complete ADLs (progressing 12/19)  Short term goal 2: increase elbow AROM by 10 degrees for increased ability to complete household chores (Goal met 11/19) Update goal to additional 10 degrees; (Goal met 12/19) Update goal to additional 10 degrees  Short term goal 3: increase L hand  strength by 10# to increased ability to grasp objects during ADLs  (progressing 12/19)  Short term goal 4: Pt will decrease overall QuickDASH score by 5 points  (progressing 11/19)  Short term goal 5: Pt will report <5/10 pain in 3 consecutive session for increased ability to use L UE  (Goal met 12/19)  Long term goals  Time Frame for Long term goals : LTG to be decided after reaching STG  Patient Goals   Patient goals : increase overall independence to best of her ability    Assessment: Pt is a 52 y.o. F. beginning OP OT s/p thrombotic right MCA stroke in deep right corona radiata area which occured in early August, 2019. Pt continues to have limited AROM of L UE at shoulder, elbow, wrist, and hand although continues to progress with overall AROM, exercises, hand activities, and ADLs. Pt demonstrates increased consistency with AROM of L hand including grasp/release, opposition, and ability to grasp objects via tip tip pinch. Pt continues to increase ability to complete HEP with assist from her mother and family while completing daily although reporting able to complete more independently from family. Pt continues to benefit from skilled OT services at this time to increase overall independence and use of L UE.      Timed Code Treatment Minutes:  45    Total Treatment Min utes:   45    Charges:  x2 Ther Ex        x1 E-stim attended    Treatment/Activity Tolerance:     [x]  Patient tolerated treatment well []  Patient limited by fatigue    [x]  Patient limited by pain []  Patient limited by other medical complications   []  Other:     Prognosis: [x]  Good [x]  Fair  []  Poor    Patient Requires Follow-up:  [x]  Yes  []  No    Plan: [x]  Continue per plan of care []  Alter current plan (2x a week/ 4 weeks)   []  Plan of care initiated []  Hold pending MD visit []  Discharge    Plan for Next Session:      Electronically signed by:  LAUREN Fierro/BART

## 2020-01-16 ENCOUNTER — HOSPITAL ENCOUNTER (OUTPATIENT)
Dept: PHYSICAL THERAPY | Age: 50
Setting detail: THERAPIES SERIES
Discharge: HOME | End: 2020-01-16
Payer: COMMERCIAL

## 2020-01-16 ENCOUNTER — HOSPITAL ENCOUNTER (OUTPATIENT)
Dept: OCCUPATIONAL THERAPY | Age: 50
Setting detail: THERAPIES SERIES
Discharge: HOME | End: 2020-01-16
Payer: COMMERCIAL

## 2020-01-16 PROCEDURE — 97110 THERAPEUTIC EXERCISES: CPT

## 2020-01-16 PROCEDURE — 97032 APPL MODALITY 1+ESTIM EA 15: CPT

## 2020-01-16 NOTE — PROGRESS NOTES
strengthening. Her ankle DF AROM achieve neutral this afternoon, L knee flex AROM was mildly decreased. Plan for 2 more sessions to address strength/mobility deficits. Discharge Recommendations: Home with assist PRN;2-3 sessions per week; Outpatient PT  Safety Devices  Type of devices: All fall risk precautions in place;Gait belt      Goals:  Short term goals  Time Frame for Short term goals: In 2-4 weeks pt will   Short term goal 1: Improve L knee Flex AROM to 110 degrees (not met 12/19), and L ankle DF AROM to -6 degrees (from neutral PF/DF) (met 12/19) to facilitate improved gait mechanics. Short term goal 2: Improve L knee Flex strength to 3+/5 (met 12/19), hip ABD strength to 4/5 (not met 12/19), and L ankle PF strength to 3/5 (met 12/19) to facilitate improved independence ambulating without cane. Short term goal 3: Ambulate 200' without AD support over level surfaces, independently, to demonstrate improved independence ambulating - met 11/21 - progress to ambulating 600' independently without AD as part of 6 MWT - met 12/19. Short term goal 4: Ascend/Descend 12 steps without UE support, reciprocal pattern, SBA, to demonstrate improved balance - met 11/21 - progress to ascending/descending 12 steps without UE support, reciprocal pattern, independently - met 12/19. Long term goals  Time Frame for Long term goals : In 4-6 weeks pt will perform  Long term goal 1: Improve L knee Flex AROM to 120 degrees, and L ankle DF AROM to 0 degrees (neutral PF/DF) to facilitate improved gait mechanics. - not met 11/21, or 12/19.   Long term goal 2: Improve L knee Flex strength to 4/5, hip ABD strength to 4+/5, and L ankle PF strength to 4/5 to facilitate improved independence ambulating without cane - not met 11/21, or 12/19  Long term goal 3: Ambulate at least 1500' over even/uneven surfaces with LRAD, mod I, to demonstrate independence performing community ambulation - not met 11/21, or 12/19  Long term goal 4: Score

## 2020-01-16 NOTE — PROGRESS NOTES
Occupational Therapy      Occupational Therapy Daily Treatment Note    Date:  2020    Patient Name:  Lotus Zelaya     :  1970  MRN: [id number]    Restrictions/Precautions:    Medical/Treatment Diagnosis Information:  · Diagnosis: I69.359 R cerebral infarction  ·   Insurance/Certification information:  OT Insurance Information: Medical [place]  Physician Information:  Referring Practitioner: Dr. Bhavin Mcrae of care signed (Y/N):  Y  Visit# / total visits:  , ,    Pain level: 0/10 L shoulder     G-Code (if applicable):      Date / Visit # G-Code Applied:  /    QuickDASH Total Score: 27 (20)       QuickDASH Disability/Symptom Score :  36.36% (19)     Progress Note: []  Yes  [x]  No  Next due by: Visit #10      Subjective: Pt reports no significant changes. Pt reports she continues to complete HEP daily. Discussed with pt 1x a week for next 2 weeks with pt in agreement.       Objective Measures:  Eval 10/22  ADL  Feeding: Setup(to cut food)  Grooming: Minimal assistance(assist for hair)  UE Bathing: Setup(assist for managing L UE)  LE Bathing: Minimal assistance(occssionally assist for B feet and lower leg)  UE Dressing: Minimal assistance(assist for all buttons, ties, zippers, etc)  LE Dressing: Minimal assistance  Toileting: Independent  Tone RUE  RUE Tone: Normotonic  Tone LUE  LUE Tone: Hypotonic  Coordination  Movements Are Fluid And Coordinated: No(L UE)  Coordination and Movement description: Fine motor impairments;Gross motor impairments  Functional Activity Tolerance  Functional Activity Tolerance: Endurance does not limit participation in activity  Balance  Sitting Balance: Supervision  Standing Balance: Stand by assistance(quad cane)  Functional Mobility  Functional - Mobility Device: Other(quad cane)  Assist Level: Stand by assistance  Cognition  Overall Cognitive Status: WFL  Sensation  Overall Sensation Status: Impaired(dull/tingling in L UE)  LUE PROM (degrees)  LUE PROM: Exceptions  LUE General PROM: WFL although shoulder ROM limited by pain   L Shoulder Flex  0-180: ~50 degrees  L Shoulder ABduction 0-180: ~70 degrees  LUE AROM (degrees)  LUE AROM : Exceptions  LUE General AROM: pt with noted muscle activation in shoulder, elbow, wrist, and hand  L Shoulder Flexion 0-180: 0  L Shoulder Extension 0-45: 0  L Shoulder ABduction 0-180: 0  L Shoulder Int Rotation  0-70: 0  L Shoulder Ext Rotation  0-90: 0  L Elbow Flexion 0-145: 25  L Elbow Extension 145-0: 0  L Forearm Pron 0-90: 0  L Forearm Supination  0-90: 0  L Wrist Flexion 0-80: 0  L Wrist Extension 0-70: 10   Left Hand PROM (degrees)  Left Hand PROM: WFL  Left Hand AROM (degrees)  Left Hand AROM: Exceptions  Left Hand General AROM: WFL for completing fist but unable to complete extension  RUE PROM (degrees)  RUE PROM: WF  RUE AROM (degrees)  RUE AROM : WFL  Right Hand PROM (degrees)  Right Hand PROM: WFL  Right Hand AROM (degrees)  Right Hand AROM: WFL  LUE Strength  Gross LUE Strength: Exceptions to [facility]  L Shoulder Flex: 2-/5  L Shoulder Ext: 2-/5  L Shoulder ABduction: 2-/5  L Shoulder ADduction: 2-/5  L Shoulder Int Rotation: 1/5  L Shoulder Ext Rotation: 1/5  L Elbow Flex: 2+/5  L Elbow Ext: 1/5  L Forearm Pron: 1/5  L Forearm Sup: 1/5  L Wrist Flex: 1/5  L Wrist Ext: 2+/5  L Hand General: 3-/5  RUE Strength  Gross RUE Strength: WFL  Hand Dominance  Hand Dominance: Right  Left Hand Strength -  (lbs)  Handle Setting 2: 5, 5, 5  Hand Assessment Comment  Hand Assessment Comment: Pt only able to complete gross grasp; unable to complete opposition or finger extension 12/19  ADL  Feeding: Setup(to cut food)  Grooming: Minimal assistance(assist for hair)  UE Bathing: Setup(assist for managing L UE)  LE Bathing: Minimal assistance(occssionally assist for B feet and lower leg)  UE Dressing: Minimal assistance(assist for all buttons, ties, zippers, etc)  LE Dressing: Minimal assistance  Toileting: Independent  Tone RUE  RUE Tone: Normotonic  Tone LUE  LUE Tone: Hypotonic  Coordination  Movements Are Fluid And Coordinated: No(L UE)  Coordination and Movement description: Fine motor impairments;Gross motor impairments  Functional Activity Tolerance  Functional Activity Tolerance: Endurance does not limit participation in activity  Balance  Sitting Balance: Supervision  Standing Balance: Stand by assistance(quad cane)  Functional Mobility  Functional - Mobility Device: Other(quad cane)  Assist Level: Stand by assistance  Cognition  Overall Cognitive Status: WFL  Sensation  Overall Sensation Status: Impaired(dull/tingling in L UE)  LUE PROM (degrees)  LUE PROM: Exceptions  LUE General PROM: WFL although shoulder ROM limited by pain   L Shoulder Flex  0-180: ~50 degrees  L Shoulder ABduction 0-180: ~70 degrees  LUE AROM (degrees)  LUE AROM : Exceptions  LUE General AROM: pt with noted muscle activation in shoulder, elbow, wrist, and hand  L Shoulder Flexion 0-180: 15  L Shoulder Extension 0-45: 0  L Shoulder ABduction 0-180: 20  L Shoulder Int Rotation  0-70: 0  L Shoulder Ext Rotation  0-90: 0  L Elbow Flexion 0-145: 120    L Elbow Extension 145-0: 145*  L Forearm Pron 0-90: 90  L Forearm Supination  0-90: 35  L Wrist Flexion 0-80: 5  L Wrist Extension 0-70: 50* to neutral   Left Hand PROM (degrees)  Left Hand PROM: WFL  Left Hand AROM (degrees)  Left Hand AROM: Exceptions  Left Hand General AROM: WFL for completing fist but unable to complete extension  RUE PROM (degrees)  RUE PROM: WFL  RUE AROM (degrees)  RUE AROM : WFL  Right Hand PROM (degrees)  Right Hand PROM: WFL  Right Hand AROM (degrees)  Right Hand AROM: WFL  LUE Strength  Gross LUE Strength: Exceptions to [facility]  L Shoulder Flex: 2-/5  L Shoulder Ext: 2-/5  L Shoulder ABduction: 2-/5  L Shoulder ADduction: 2-/5  L Shoulder Int Rotation: 1/5  L Shoulder Ext Rotation: 1/5  L Elbow Flex: 2+/5  L Elbow Ext: 1/5  L Forearm Pron: 1/5  L Forearm Sup: 1/5  L Wrist Flex: 1/5  L Wrist Ext: 2+/5  L Hand General: 3-/5  RUE Strength  Gross RUE Strength: WFL  Hand Dominance  Hand Dominance: Right  Left Hand Strength -  (lbs)  Handle Setting 2: 7, 8, 7  Hand Assessment Comment  Hand Assessment Comment: Pt only able to complete gross grasp; unable to complete opposition or finger extension 1/9/20   ADL  Feeding: Setup(to cut food)  Grooming: Minimal assistance(assist for hair)  UE Bathing: Setup(assist for managing L UE)  LE Bathing: Minimal assistance(occssionally assist for B feet and lower leg)  UE Dressing: Minimal assistance(assist for all buttons, ties, zippers, etc)  LE Dressing: Minimal assistance  Toileting: Independent  Tone RUE  RUE Tone: Normotonic  Tone LUE  LUE Tone: Hypotonic  Coordination  Movements Are Fluid And Coordinated: No(L UE)  Coordination and Movement description: Fine motor impairments;Gross motor impairments  Functional Activity Tolerance  Functional Activity Tolerance: Endurance does not limit participation in activity  Balance  Sitting Balance: Supervision  Standing Balance: Stand by assistance(quad cane)  Functional Mobility  Functional - Mobility Device: Other(quad cane)  Assist Level: Stand by assistance  Cognition  Overall Cognitive Status: WFL  Sensation  Overall Sensation Status: Impaired(dull/tingling in L UE)  LUE PROM (degrees)  LUE PROM: Exceptions  LUE General PROM: WFL although shoulder ROM limited by pain   L Shoulder Flex  0-180: ~50 degrees  L Shoulder ABduction 0-180: ~70 degrees  LUE AROM (degrees)  LUE AROM : Exceptions  LUE General AROM: pt with noted muscle activation in shoulder, elbow, wrist, and hand  L Shoulder Flexion 0-180: 25  L Shoulder Extension 0-45: 0  L Shoulder ABduction 0-180: 45  L Shoulder Int Rotation  0-70: 0  L Shoulder Ext Rotation  0-90: 0  L Elbow Flexion 0-145: 125    L Elbow Extension 145-0: 160*  L Forearm Pron 0-90: 90  L Forearm Supination  0-90: 55  L Wrist Flexion 0-80: 20  L Wrist Extension 0-70: 15   Left Hand PROM stroke in deep right corona radiata area which occured in early August, 2019. Pt continues to have limited AROM of L UE at shoulder, elbow, wrist, and hand although continues to progress with overall AROM, exercises, hand activities, and ADLs. Pt continues to increase ability to complete HEP with assist from her mother and family while completing daily although reporting able to complete more independently from family. Pt continues to make improvements with all AROM with this date presenting with increased wrist extension and shoulder flexion. Pt continues to benefit from skilled OT services at this time to increase overall independence and use of L UE.      Timed Code Treatment Minutes:  45    Total Treatment Min utes:   45    Charges:  x2 Ther Ex        x1 E-stim attended    Treatment/Activity Tolerance:     [x]  Patient tolerated treatment well []  Patient limited by fatigue    [x]  Patient limited by pain []  Patient limited by other medical complications   []  Other:     Prognosis: [x]  Good [x]  Fair  []  Poor    Patient Requires Follow-up:  [x]  Yes  []  No    Plan: [x]  Continue per plan of care []  Alter current plan (2x a week/ 4 weeks)   []  Plan of care initiated []  Hold pending MD visit []  Discharge    Plan for Next Session:      Electronically signed by:  LAUREN Clemente/BRAT

## 2020-01-21 ENCOUNTER — HOSPITAL ENCOUNTER (OUTPATIENT)
Dept: PHYSICAL THERAPY | Age: 50
Setting detail: THERAPIES SERIES
Discharge: HOME | End: 2020-01-21
Payer: COMMERCIAL

## 2020-01-21 ENCOUNTER — HOSPITAL ENCOUNTER (OUTPATIENT)
Dept: OCCUPATIONAL THERAPY | Age: 50
Setting detail: THERAPIES SERIES
Discharge: HOME | End: 2020-01-21
Payer: COMMERCIAL

## 2020-01-21 PROCEDURE — 97032 APPL MODALITY 1+ESTIM EA 15: CPT

## 2020-01-21 PROCEDURE — 97112 NEUROMUSCULAR REEDUCATION: CPT

## 2020-01-21 PROCEDURE — 97110 THERAPEUTIC EXERCISES: CPT

## 2020-01-21 NOTE — PROGRESS NOTES
Exceptions  Left Hand General AROM: WFL for completing fist but unable to complete extension  RUE PROM (degrees)  RUE PROM: WF  RUE AROM (degrees)  RUE AROM : WFL  Right Hand PROM (degrees)  Right Hand PROM: WFL  Right Hand AROM (degrees)  Right Hand AROM: WFL  LUE Strength  Gross LUE Strength: Exceptions to [facility] Shoulder Flex: 2-/5  L Shoulder Ext: 2-/5  L Shoulder ABduction: 2-/5  L Shoulder ADduction: 2-/5  L Shoulder Int Rotation: 1/5  L Shoulder Ext Rotation: 1/5  L Elbow Flex: 2+/5  L Elbow Ext: 1/5  L Forearm Pron: 1/5  L Forearm Sup: 1/5  L Wrist Flex: 1/5  L Wrist Ext: 2+/5  L Hand General: 3-/5  RUE Strength  Gross RUE Strength: WFL  Hand Dominance  Hand Dominance: Right  Left Hand Strength -  (lbs)  Handle Setting 2: 8, 8, 8  Hand Assessment Comment  Hand Assessment Comment: Pt only able to complete gross grasp; unable to complete opposition or finger extension                                   Therapeutic Activities:      Therapeutic Exercise:   Exercise/Equipment  Resistance/Repetitions   Other comments   AAROM/PROM x15 reps each; supine/seated    Shoulder shrugs    Scapular squeezes    Shoulder:  Flexion/extension  AB/AD    Chest press (use of pole)    Elbow:  Flexion/extension    Forearm: x2 sets  Pronation/supination    Wrist: x2 sets  Flexion/extension     Hand:  Grasp/release   Increased difficulty completing shoulder flexion this date    Increased ability to complete with cane and assist from R UE    With resistance for elbow flexion    Pt with increased endurance and quality completing exercises    Increased tightness noted during supination   Hand exercises Grasp and release; x10 reps; yellow theraputty    Pinching; x10 reps; yellow   Thumb and index finger  Thumb and middle finger    AROM; finger AB/AD   Pt with significant difficulty completing hand/finger extension   Weight bearing    Pt with good ability to complete    Pt needing assist to keep L hand placed on mat    Support at L

## 2020-01-21 NOTE — PROGRESS NOTES
Physical Therapy  Daily Treatment Note  Date: 2020  Patient Name: Moise Johnson  MRN: [id number]     :   1970    Subjective:   General  Chart Reviewed: Yes  Additional Pertinent Hx: Pt is a 52 y.o. F. beginning OP PT s/p thrombotic right MCA stroke in deep right corona radiata area which occured in early . Pt completed a bout of therapy at [facility], then discharged to her mother's home. She returns to therapy to address L-sided weakness, and gait instability. PMH includes HTN. Response To Previous Treatment: Patient with no complaints from previous session. Family / Caregiver Present: Yes  Referring Practitioner: Dr. Violet Morse  PT Visit Information  Onset Date: 19  PT Insurance Information: Medical [place]  Total # of Visits Approved: 24  Total # of Visits to Date: 22  Subjective  Subjective: Pt continues to transition spending more time at her home. Denies any difficulty ambulating. Plans on going to gym once discharged from PT (next session). Treatment Activities:   Manual therapy  PROM: (B) ankle DF inclined stretch x 3 min. Exercises  Exercise 1: L ankle PF x 15 with 100 lb. resistance, 2 x 15 with 105 lb. resistance. Exercise 2: SLS RLE/LLE 5 trials x 10 s. holds. Pt able to achieve balance more quickly this session, and maintain consistently with RLE/LLE. Exercise 3: Sidelying: L hip ABD AROM x 15, Isometric x 10. Exercise 4: Side-lying: L hip Ext AROM x 10, Clamshell x 10. Exercise 5: Supine: (B) LE bridge x 10, (B) LE bridge followed by RLE march x 10. Exercise 6: Standing L hip Ext SLR x 10 with 4 kg. resistance, x 10 with 5 kg. resistance. Exercise 7: Forward stepping LLE leading over 3, 3\" cones x 1 trial.   Exercise 8: Side-stepping over 3 12\" cones x 3 trials to R, x 3 trials to L, CGA, cues to increase LLE step height.       Assessment:   Conditions Requiring Skilled Therapeutic Intervention  Body structures, Functions, Activity limitations: surfaces with LRAD, mod I, to demonstrate independence performing community ambulation - not met 11/21, or 12/19  Long term goal 4: Score at least 54/56 on the Mcmahon Balance Scale to demonstrate improved balance - met 11/21 progress goal score to 56/56. -met 12/19. Patient Goals   Patient goals : To walk without a cane, and get back to driving    Plan:    Plan  Times per week: 1x  Plan weeks: 1 week  Specific instructions for Next Treatment: Improve L hip, knee, and ankle strength; Increase L knee and ankle ROM; Improve balance; Practice ambulation without cane  Current Treatment Recommendations: Strengthening, ROM, Balance Training, Functional Mobility Training, Transfer Training, Modalities, Positioning, Home Exercise Program, Safety Education & Training, Patient/Caregiver Education & Training, Equipment Evaluation, Education, & procurement, Gait Training, Stair training, Manual Therapy - Joint Manipulation, Neuromuscular Re-education, Manual Therapy - Soft Tissue Mobilization, Endurance Training  Safety Devices  Type of devices:  All fall risk precautions in place, Gait belt  Restraints  Initially in place: No  Timed Code Treatment Minutes: 40 Minutes     Therapy Time   Individual Concurrent Group Co-treatment   Time In 1430         Time Out 1515         Minutes 45         Timed Code Treatment Minutes: 40 Minutes   Therapeutic exercise x 2 charges  Neuro facilitation x 1 charge    Sarahi Bal PT    Electronically signed by Sarahi Bal PT [id number] on 1/21/2020 at 4:42 PM

## 2020-01-23 ENCOUNTER — APPOINTMENT (OUTPATIENT)
Dept: PHYSICAL THERAPY | Age: 50
End: 2020-01-23
Payer: COMMERCIAL

## 2020-01-27 ENCOUNTER — TELEPHONE (OUTPATIENT)
Dept: FAMILY MEDICINE CLINIC | Age: 50
End: 2020-01-27

## 2020-01-28 ENCOUNTER — HOSPITAL ENCOUNTER (OUTPATIENT)
Dept: PHYSICAL THERAPY | Age: 50
Setting detail: THERAPIES SERIES
Discharge: HOME | End: 2020-01-28
Payer: COMMERCIAL

## 2020-01-28 ENCOUNTER — HOSPITAL ENCOUNTER (OUTPATIENT)
Dept: OCCUPATIONAL THERAPY | Age: 50
Setting detail: THERAPIES SERIES
Discharge: HOME | End: 2020-01-28
Payer: COMMERCIAL

## 2020-01-28 PROCEDURE — 97112 NEUROMUSCULAR REEDUCATION: CPT

## 2020-01-28 PROCEDURE — 97110 THERAPEUTIC EXERCISES: CPT

## 2020-01-28 PROCEDURE — 97530 THERAPEUTIC ACTIVITIES: CPT

## 2020-01-28 PROCEDURE — 97116 GAIT TRAINING THERAPY: CPT

## 2020-01-28 RX ORDER — BACLOFEN 5 MG/1
5 TABLET ORAL 3 TIMES DAILY PRN
Qty: 270 TABLET | Refills: 0 | Status: SHIPPED | OUTPATIENT
Start: 2020-01-28 | End: 2020-04-27

## 2020-01-28 RX ORDER — CLOPIDOGREL BISULFATE 75 MG/1
75 TABLET ORAL DAILY
Qty: 90 TABLET | Refills: 0 | Status: SHIPPED | OUTPATIENT
Start: 2020-01-28 | End: 2020-07-02 | Stop reason: SDUPTHER

## 2020-01-28 RX ORDER — PANTOPRAZOLE SODIUM 40 MG/1
40 TABLET, DELAYED RELEASE ORAL
Qty: 90 TABLET | Refills: 0 | Status: SHIPPED | OUTPATIENT
Start: 2020-01-28 | End: 2020-07-06 | Stop reason: SDUPTHER

## 2020-01-28 RX ORDER — AMLODIPINE BESYLATE 10 MG/1
10 TABLET ORAL DAILY
Qty: 90 TABLET | Refills: 0 | Status: SHIPPED | OUTPATIENT
Start: 2020-01-28 | End: 2020-07-02 | Stop reason: SDUPTHER

## 2020-01-28 RX ORDER — ATORVASTATIN CALCIUM 80 MG/1
80 TABLET, FILM COATED ORAL NIGHTLY
Qty: 90 TABLET | Refills: 0 | Status: SHIPPED | OUTPATIENT
Start: 2020-01-28 | End: 2020-07-02 | Stop reason: SDUPTHER

## 2020-01-28 RX ORDER — ASPIRIN 81 MG/1
81 TABLET ORAL DAILY
Qty: 90 TABLET | Refills: 0 | Status: SHIPPED | OUTPATIENT
Start: 2020-01-28 | End: 2020-07-02 | Stop reason: SDUPTHER

## 2020-01-28 RX ORDER — CLONIDINE HYDROCHLORIDE 0.2 MG/1
0.2 TABLET ORAL 2 TIMES DAILY
Qty: 180 TABLET | Refills: 0 | Status: SHIPPED | OUTPATIENT
Start: 2020-01-28 | End: 2020-07-06 | Stop reason: SDUPTHER

## 2020-01-28 RX ORDER — POTASSIUM CHLORIDE 750 MG/1
10 TABLET, FILM COATED, EXTENDED RELEASE ORAL
Qty: 90 TABLET | Refills: 0 | Status: SHIPPED | OUTPATIENT
Start: 2020-01-28 | End: 2020-07-02 | Stop reason: SDUPTHER

## 2020-01-28 NOTE — PROGRESS NOTES
Occupational Therapy        Outpatient Occupational Therapy  [] [facility] [address] SUSANNE ELLIS   Phone: [phone]   Fax: [phone]   [x] [facility]           Phone: [phone]   Fax: [phone]  [] Jenn   Phone: [phone]   Fax: [phone]     Occupational Therapy Progress Note  Date: 2020        Patient Name:  Ramiro Dubin    :  1970  MRN: [id number]    Restrictions/Precautions:    Medical/Treatment Diagnosis Information:  · Diagnosis: I69.359 R cerebral infarction  ·    Insurance/Certification information:  OT Insurance Information: Medical [place]  Physician Information:  Referring Practitioner: Dr. Fontenot Courser of care signed (Y/N):  Y  Visit# / total visits:  , ,    Pain level:      0/10 L shoulder                G-Code (if applicable):                                             Date / Visit # G-Code Applied:  /    QuickDASH Total Score: 25 (20)       QuickDASH Disability/Symptom Score :  31.82% (20)     Time Period for Report:  10-22/  Cancels/No-shows to date:  0    Plan of Care/Treatment to date:  [x] Therapeutic Exercise    [x] Modalities:  [x] Therapeutic Activity     [] Ultrasound  [x] Electrical Stimulation  [] Total Motion Release     [] Fluidotherapy [] Dimitrios Spore  [x] Neuromuscular Re-education  [] Cold/hotpack [] Iontophoresis  [x] Instruction in HEP     Other:  [x] Manual Therapy      []            [] Aquatic Therapy      []                   ? Significant Findings At Last Visit/Comments:    Subjective:     Pt reports no significant changes. Pt reports she continues to complete HEP daily.      Objective:  Objective Measures:  Eval 10/22  ADL  Feeding: Setup(to cut food)  Grooming: Minimal assistance(assist for hair)  UE Bathing: Setup(assist for managing L UE)  LE Bathing: Minimal assistance(occssionally assist for B feet and lower leg)  UE Dressing: Minimal assistance(assist for all buttons, ties, zippers, etc)  LE Dressing: Minimal 0-70: 15   Left Hand PROM (degrees)  Left Hand PROM: WFL  Left Hand AROM (degrees)  Left Hand AROM: Exceptions  Left Hand General AROM: WFL for completing fist but unable to complete extension  RUE PROM (degrees)  RUE PROM: WFL  RUE AROM (degrees)  RUE AROM : WFL  Right Hand PROM (degrees)  Right Hand PROM: WFL  Right Hand AROM (degrees)  Right Hand AROM: WFL  LUE Strength  Gross LUE Strength: Exceptions to [facility]  L Shoulder Flex: 2-/5  L Shoulder Ext: 2-/5  L Shoulder ABduction: 2-/5  L Shoulder ADduction: 2-/5  L Shoulder Int Rotation: 1/5  L Shoulder Ext Rotation: 1/5  L Elbow Flex: 2+/5  L Elbow Ext: 1/5  L Forearm Pron: 1/5  L Forearm Sup: 1/5  L Wrist Flex: 1/5  L Wrist Ext: 2+/5  L Hand General: 3-/5  RUE Strength  Gross RUE Strength: WFL  Hand Dominance  Hand Dominance: Right  Left Hand Strength -  (lbs)  Handle Setting 2: 8, 8, 8  Hand Assessment Comment  Hand Assessment Comment: Pt only able to complete gross grasp; unable to complete opposition or finger extension 1/28/20   ADL  Feeding: Setup(to cut food)  Grooming: Minimal assistance(assist for hair)  UE Bathing: Setup(assist for managing L UE)  LE Bathing: Minimal assistance(occssionally assist for B feet and lower leg)  UE Dressing: Minimal assistance(assist for all buttons, ties, zippers, etc)  LE Dressing: Minimal assistance  Toileting: Independent  Tone RUE  RUE Tone: Normotonic  Tone LUE  LUE Tone: Hypotonic  Coordination  Movements Are Fluid And Coordinated: No(L UE)  Coordination and Movement description: Fine motor impairments;Gross motor impairments  Functional Activity Tolerance  Functional Activity Tolerance: Endurance does not limit participation in activity  Balance  Sitting Balance: Supervision  Standing Balance: Stand by assistance(quad cane)  Functional Mobility  Functional - Mobility Device: Other(quad cane)  Assist Level: Stand by assistance  Cognition  Overall Cognitive Status: WFL  Sensation  Overall Sensation Status: goals with increasing overall AROM, strength, and coordination of L UE/hand. Pt has increased ability to use L UE more functionally with pt now able to complete opposition of 1st and 2nd digit to thumb, completing tip to tip pinch, lateral pinch, and 3 draw pinch with limited strength. Pt has progressed to being able to manipulate small objects including stacking x7 blocks with elbow supported. Pt still limited with decreased L shoulder flexion against gravity and general fatigue with hand/finger exercises, especially extension. Pt will continue to benefit from skilled OT services at this time. Pt will continue to benefit from  OT x1 a week for 8 weeks.       Progress towards goals:    Goals  Short term goals  Time Frame for Short term goals: 4-6 weeks  Short term goal 1: increase all shoulder AROM by 10 degrees to increase ability to complete ADLs (Goal met 1/28) Update: Pt will increase shoulder flexion >45 degrees  Short term goal 2: increase elbow AROM by 10 degrees for increased ability to complete household chores (Goal met 11/19) Update goal to additional 10 degrees; (Goal met 12/19) Update goal to additional 10 degrees (progressing 1/28)  Short term goal 3: increase L hand  strength by 10# to increased ability to grasp objects during ADLs  (progressing 1/28)  Short term goal 4: Pt will decrease overall QuickDASH score by 5 points  (Goal met 1/28) Update goal to additional 5 points  Short term goal 5: Pt will report <5/10 pain in 3 consecutive session for increased ability to use L UE  (Goal met 12/19)  Long term goals  Time Frame for Long term goals : LTG to be decided after reaching STG  Patient Goals   Patient goals : increase overall independence to best of her ability     Updated Goals:  Short term goal 5: pt will complete stacking of x4 blocks with use of L hand with no assist for increased ability to complete fine motor tasks (Goal met 1/28)     Short term goal 6: pt will complete 9 hole peg test for increased ability to complete fine motor tasks (progressing 1/28)     Short term goal 7: pt will sign up and complete driving evaluation for 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 (progressing 1/28)    Current Frequency/Duration:  # Days per week: [x] 1 day # Weeks: [] 1 week [x] 4 weeks      [] 2 days? [] 2 weeks [] 5 weeks      [] 3 days   [] 3 weeks [] 6 weeks     Rehab Potential: [] Excellent [x] Good [] Fair  [] Poor     Goal Status:  [] Achieved [x] Partially Achieved  [] Not Achieved     Patient Status: [] Continue per initial plan of Care     [] Patient now discharged     [x] Additional visits requested, Please re-certify for additional visits:      Requested frequency/duration:  1X/week for 4 weeks    Electronically signed by:  LAUREN Yeung/L    If you have any questions or concerns, please don't hesitate to call.   Thank you for your referral.    Physician Signature:________________________________Date:__________________  By signing above, therapists plan is approved by physician

## 2020-01-28 NOTE — PROGRESS NOTES
Mcmahon Balance Scale    SITTING TO STANDING    INSTRUCTIONS: Please stand up. Try not to use your hand for support. (x) 4 able to stand without using hands and stabilize independently  ( ) 3 able to stand independently using hands  ( ) 2 able to stand using hands after several tries  ( ) 1 needs minimal aid to stand or stabilize  ( ) 0 needs moderate or maximal assist to stand    STANDING UNSUPPORTED    INSTRUCTIONS: Please stand for two minutes without holding on. (x) 4 able to stand safely for 2 minutes  ( ) 3 able to stand 2 minutes with supervision  ( ) 2 able to stand 30 seconds unsupported  ( ) 1 needs several tries to stand 30 seconds unsupported  ( ) 0 unable to stand 30 seconds unsupported    If a subject is able to stand 2 minutes unsupported, score full points for sitting unsupported. Proceed to item #4. SITTING WITH BACK UNSUPPORTED BUT FEET SUPPORTED ON FLOOR OR ON A STOOL    INSTRUCTIONS: Please sit with arms folded for 2 minutes. (x) 4 able to sit safely and securely for 2 minutes  ( ) 3 able to sit 2 minutes under supervision  ( ) 2 able to able to sit 30 seconds  ( ) 1 able to sit 10 seconds  ( ) 0 unable to sit without support 10 seconds    STANDING TO SITTING    INSTRUCTIONS: Please sit down. (x) 4 sits safely with minimal use of hands  ( ) 3 controls descent by using hands  ( ) 2 uses back of legs against chair to control descent  ( ) 1 sits independently but has uncontrolled descent  ( ) 0 needs assist to sit    TRANSFERS    INSTRUCTIONS: Arrange chair(s) for pivot transfer. Ask subject to transfer one way   toward a seat with armrests and one way  toward a seat without armrests. You may use two chairs (one with and one without armrests) or a bed and a chair.     (x) 4 able to transfer safely with minor use of hands  ( ) 3 able to transfer safely definite need of hands  ( ) 2 able to transfer with verbal cuing and/or supervision  ( ) 1 needs one person to assist  ( ) 0 needs two
you have any questions or concerns, please don't hesitate to call.   Thank you for your referral.    Physician Signature:________________________________Date:__________________  By signing above, therapists plan is approved by physician

## 2020-01-28 NOTE — PROGRESS NOTES
Exceptions  Left Hand General AROM: WFL for completing fist but unable to complete extension  RUE PROM (degrees)  RUE PROM: WF  RUE AROM (degrees)  RUE AROM : WFL  Right Hand PROM (degrees)  Right Hand PROM: WFL  Right Hand AROM (degrees)  Right Hand AROM: WFL  LUE Strength  Gross LUE Strength: Exceptions to [facility] Shoulder Flex: 2-/5  L Shoulder Ext: 2-/5  L Shoulder ABduction: 2-/5  L Shoulder ADduction: 2-/5  L Shoulder Int Rotation: 1/5  L Shoulder Ext Rotation: 1/5  L Elbow Flex: 3-/5  L Elbow Ext: 2/5  L Forearm Pron: 1/5  L Forearm Sup: 1/5  L Wrist Flex: 1/5  L Wrist Ext: 2+/5  L Hand General: 3-/5  RUE Strength  Gross RUE Strength: WFL  Hand Dominance  Hand Dominance: Right  Left Hand Strength -  (lbs)  Handle Setting 2: 10, 7, 10  Pinch strength:  L hand  Tip to tip: 2, 2, 2  3 draw: 3, 3, 3  Lateral pinch: 4, 4, 3    Hand Assessment Comment  Hand Assessment Comment: Pt able to complete opposition of 1st and 2nd digit to thumb consistently                                    Therapeutic Activities:      Therapeutic Exercise:   Exercise/Equipment  Resistance/Repetitions   Other comments   AAROM/PROM x15 reps each; supine/seated    Shoulder shrugs    Scapular squeezes    Shoulder:  Flexion/extension  AB/AD    Chest press (use of pole)    Elbow:  Flexion/extension    Forearm: x2 sets  Pronation/supination    Wrist: x2 sets  Flexion/extension     Hand:  Grasp/release   Increased difficulty completing shoulder flexion this date    Increased ability to complete with cane and assist from R UE    With resistance for elbow flexion    Pt with increased endurance and quality completing exercises    Increased tightness noted during supination   Hand exercises    Pt with significant difficulty completing hand/finger extension   Weight bearing    Pt with good ability to complete    Pt needing assist to keep L hand placed on mat    Support at L elbow due to  shoulder pain    Pt reporting increased pain in L wrist this date with wrist extension therefore wedge used during weight bearing to decreased wrist extension   Brace       Activity Pt completed block stacking; pt able to stack x7 blocks with elbow supported on table; min compensation noted from trunk for shoulder flex/AB  Attempted 9 hole peg test; pt able to complete x2 pegs with increased time; pt with noted fatigue         Pt with significant difficulty reaching out in front along with completing finger and hand extension during release;      Pt supported at hand and elbow during task; needing Max A to complete    Min cues for compensation with body        Pt with moderate ability to complete    Min pain noted in anterior shoulder   ADL                               E-stim        Home Exercise Program:    · Pt issued handout for AROM/PROM for L UE to be completed in supine/seated  · Weight bearing through hand and elbow  · Grasp release with yellow theraputty (yellow theraputty issued); pinching  · Table slides  · Donning/doff sling  · Self feeding with use of L UE    Manual Treatments:      Modalities:     Goals  Short term goals  Time Frame for Short term goals: 4-6 weeks  Short term goal 1: increase all shoulder AROM by 10 degrees to increase ability to complete ADLs (Goal met 1/28) Update: Pt will increase shoulder flexion >45 degrees  Short term goal 2: increase elbow AROM by 10 degrees for increased ability to complete household chores (Goal met 11/19) Update goal to additional 10 degrees; (Goal met 12/19) Update goal to additional 10 degrees (progressing 1/28)  Short term goal 3: increase L hand  strength by 10# to increased ability to grasp objects during ADLs  (progressing 1/28)  Short term goal 4: Pt will decrease overall QuickDASH score by 5 points  (Goal met 1/28) Update goal to additional 5 points  Short term goal 5: Pt will report <5/10 pain in 3 consecutive session for increased ability to use L UE  (Goal met 12/19)  Long term goals  Time Frame for Long term goals : LTG to be decided after reaching STG  Patient Goals   Patient goals : increase overall independence to best of her ability    Updated Goals:  Short term goal 5: pt will complete stacking of x4 blocks with use of L hand with no assist for increased ability to complete fine motor tasks (Goal met 1/28)     Short term goal 6: pt will complete 9 hole peg test for increased ability to complete fine motor tasks (progressing 1/28)     Short term goal 7: pt will sign up and complete driving evaluation for 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 (progressing 1/28)    Assessment: Pt is a 52 y.o. F. beginning OP OT s/p thrombotic right MCA stroke in deep right corona radiata area which occured in early August, 2019. Pt has continued to make good progress towards set goals with increasing overall AROM, strength, and coordination of L UE/hand. Pt has increased ability to use L UE more functionally with pt now able to complete opposition of 1st and 2nd digit to thumb, completing tip to tip pinch, lateral pinch, and 3 draw pinch with limited strength. Pt has progressed to being able to manipulate small objects including stacking x7 blocks with elbow supported. Pt still limited with decreased L shoulder flexion against gravity and general fatigue with hand/finger exercises, especially extension. Pt will continue to benefit from skilled OT services at this time. Pt will continue to benefit from  OT x1 a week for 8 weeks.       Timed Code Treatment Minutes:  45    Total Treatment Min utes:   45    Charges:  x1 Ther Ex      x1 Neuro Re-ed    x1 Ther Act  Treatment/Activity Tolerance:     [x]  Patient tolerated treatment well []  Patient limited by fatigue    []  Patient limited by pain []  Patient limited by other medical complications   []  Other:     Prognosis: [x]  Good [x]  Fair  []  Poor    Patient Requires Follow-up:  [x]  Yes  []  No    Plan: []  Continue per plan of care [x]  Alter

## 2020-02-07 ENCOUNTER — OFFICE VISIT (OUTPATIENT)
Dept: FAMILY MEDICINE CLINIC | Age: 50
End: 2020-02-07
Payer: MEDICAID

## 2020-02-07 VITALS
BODY MASS INDEX: 37.22 KG/M2 | SYSTOLIC BLOOD PRESSURE: 134 MMHG | WEIGHT: 231.6 LBS | DIASTOLIC BLOOD PRESSURE: 82 MMHG | HEIGHT: 66 IN

## 2020-02-07 PROCEDURE — 99214 OFFICE O/P EST MOD 30 MIN: CPT | Performed by: FAMILY MEDICINE

## 2020-02-07 ASSESSMENT — PATIENT HEALTH QUESTIONNAIRE - PHQ9
SUM OF ALL RESPONSES TO PHQ QUESTIONS 1-9: 0
1. LITTLE INTEREST OR PLEASURE IN DOING THINGS: 0
SUM OF ALL RESPONSES TO PHQ9 QUESTIONS 1 & 2: 0
2. FEELING DOWN, DEPRESSED OR HOPELESS: 0
SUM OF ALL RESPONSES TO PHQ QUESTIONS 1-9: 0

## 2020-02-07 ASSESSMENT — ENCOUNTER SYMPTOMS
DIARRHEA: 0
WHEEZING: 0
NAUSEA: 0
TROUBLE SWALLOWING: 0
CHEST TIGHTNESS: 0
SORE THROAT: 0
VOMITING: 0
SHORTNESS OF BREATH: 0
ABDOMINAL PAIN: 0
SINUS PRESSURE: 0
COUGH: 0

## 2020-02-07 NOTE — PROGRESS NOTES
Prior to Visit Medications    Medication Sig Taking? Authorizing Provider   clopidogrel (PLAVIX) 75 MG tablet Take 1 tablet by mouth daily Yes Gurjit Addison DO   Baclofen 5 MG TABS Take 5 mg by mouth 3 times daily as needed (muscle spasms) Yes Arielle Addison DO   cloNIDine (CATAPRES) 0.2 MG tablet Take 1 tablet by mouth 2 times daily Yes Gurjit Addison DO   atorvastatin (LIPITOR) 80 MG tablet Take 1 tablet by mouth nightly Yes Gurjit Addison DO   amLODIPine (NORVASC) 10 MG tablet Take 1 tablet by mouth daily Yes Gurjit Addison DO   pantoprazole (PROTONIX) 40 MG tablet Take 1 tablet by mouth every morning (before breakfast) Yes Gurjit Addison DO   potassium chloride (KLOR-CON) 10 MEQ extended release tablet Take 1 tablet by mouth daily (with breakfast) Yes Charly Guerrero DO   aspirin 81 MG EC tablet Take 1 tablet by mouth daily Yes Charly Guerrero DO   Docusate Calcium (STOOL SOFTENER PO) Take by mouth Yes Historical Provider, MD        Social History     Tobacco Use    Smoking status: Never Smoker    Smokeless tobacco: Never Used   Substance Use Topics    Alcohol use: No        Vitals:    02/07/20 1259   BP: 134/82   Weight: 231 lb 9.6 oz (105.1 kg)   Height: 5' 6\" (1.676 m)     Estimated body mass index is 37.38 kg/m² as calculated from the following:    Height as of this encounter: 5' 6\" (1.676 m). Weight as of this encounter: 231 lb 9.6 oz (105.1 kg). Physical Exam  Vitals signs and nursing note reviewed. Constitutional:       Appearance: She is well-developed. HENT:      Head: Normocephalic and atraumatic. Right Ear: Tympanic membrane, ear canal and external ear normal.      Left Ear: Tympanic membrane, ear canal and external ear normal.      Nose: Nose normal.      Mouth/Throat:      Mouth: Mucous membranes are moist.   Eyes:      Conjunctiva/sclera: Conjunctivae normal.   Neck:      Musculoskeletal: Neck supple. Thyroid: No thyromegaly.    Cardiovascular:      Rate and Rhythm: Normal rate and regular rhythm. Heart sounds: No murmur. Pulmonary:      Effort: Pulmonary effort is normal.      Breath sounds: Normal breath sounds. No wheezing or rales. Abdominal:      General: Bowel sounds are normal.      Palpations: Abdomen is soft. Tenderness: There is no abdominal tenderness. Musculoskeletal:         General: Signs of injury present. Skin:     Findings: No rash. Neurological:      Mental Status: She is alert and oriented to person, place, and time. Mental status is at baseline. Psychiatric:         Mood and Affect: Mood normal.         Behavior: Behavior normal.         Thought Content: Thought content normal.         Judgment: Judgment normal.         ASSESSMENT/PLAN:  1. Right sided cerebral hemisphere cerebrovascular accident (CVA) ([facility] [address]  Improving  Will start PT outpatient next week  Continue to follow with Dr. Teresa Holman questions  Reassured the patient    - CBC Auto Differential; Future  - Comprehensive Metabolic Panel; Future  - Lipid Panel; Future    2. Hypertension, unspecified type  well controlled. Discussed signs and symptoms for immediate evaluation in the ER. Reduce sodium. Monitor diet, exercise and lose weight. Keep a BP log and bring it to your next appointment  - CBC Auto Differential; Future  - Comprehensive Metabolic Panel; Future  - Lipid Panel; Future    3. Allergic rhinitis, unspecified seasonality, unspecified trigger  Medication provided. Discussed medication use and side effects. Discussed allergy avoidance and possible etiologist including pets and carpet. Discussed the use of proper handwashing and showers. If not improved can discuss allergy referral at next appointment. Return in about 6 months (around 8/7/2020).

## 2020-02-11 ENCOUNTER — HOSPITAL ENCOUNTER (OUTPATIENT)
Dept: OCCUPATIONAL THERAPY | Age: 50
Setting detail: THERAPIES SERIES
Discharge: HOME | End: 2020-02-11
Payer: COMMERCIAL

## 2020-02-11 PROCEDURE — 97530 THERAPEUTIC ACTIVITIES: CPT

## 2020-02-11 PROCEDURE — 97110 THERAPEUTIC EXERCISES: CPT

## 2020-02-11 PROCEDURE — 97032 APPL MODALITY 1+ESTIM EA 15: CPT

## 2020-02-11 ASSESSMENT — ENCOUNTER SYMPTOMS
RHINORRHEA: 1
BACK PAIN: 1

## 2020-02-11 NOTE — PROGRESS NOTES
Occupational Therapy      Occupational Therapy Daily Treatment Note    Date:  2020    Patient Name:  Leandra Armas     :  1970  MRN: [id number]    Restrictions/Precautions:    Medical/Treatment Diagnosis Information:  · Diagnosis: I69.359 R cerebral infarction  ·   Insurance/Certification information:  OT Insurance Information: Medical [place]  Physician Information:  Referring Practitioner: Dr. Wyatt Duong of care signed (Y/N):  Y  Visit# / total visits:  , , ,    Pain level: 0/10 L shoulder     G-Code (if applicable):      Date / Visit # G-Code Applied:  /    QuickDASH Total Score: 25 (20)       QuickDASH Disability/Symptom Score :  31.82% (20)     Progress Note: []  Yes  [x]  No  Next due by: Visit #10      Subjective: Discussed with pt plan for continued therapy. Pt and therapist have agreed to discharge from therapy at this time and allow pt to complete at home and continue at a later date in order to save therapy visits.      Objective Measures:  Eval 10/22  ADL  Feeding: Setup(to cut food)  Grooming: Minimal assistance(assist for hair)  UE Bathing: Setup(assist for managing L UE)  LE Bathing: Minimal assistance(occssionally assist for B feet and lower leg)  UE Dressing: Minimal assistance(assist for all buttons, ties, zippers, etc)  LE Dressing: Minimal assistance  Toileting: Independent  Tone RUE  RUE Tone: Normotonic  Tone LUE  LUE Tone: Hypotonic  Coordination  Movements Are Fluid And Coordinated: No(L UE)  Coordination and Movement description: Fine motor impairments;Gross motor impairments  Functional Activity Tolerance  Functional Activity Tolerance: Endurance does not limit participation in activity  Balance  Sitting Balance: Supervision  Standing Balance: Stand by assistance(quad cane)  Functional Mobility  Functional - Mobility Device: Other(quad cane)  Assist Level: Stand by assistance  Cognition  Overall Cognitive Status: WFL  Sensation  Overall Sensation Status: Poor    Patient Requires Follow-up:  [x]  Yes  []  No    Plan: []  Continue per plan of care []  Alter current plan (1x a week/ 8 weeks)   []  Plan of care initiated []  Hold pending MD visit [x]  Discharge    Plan for Next Session:      Electronically signed by:  Teressa MarcusOTR/L

## 2020-02-11 NOTE — PROGRESS NOTES
Occupational Therapy        Outpatient Occupational Therapy  [] [place]   Phone: [phone]   Fax: [phone]   [x] [facility]           Phone: [phone]   Fax: [phone]  [] Jenn   Phone: [phone]   Fax: [phone]     Occupational Therapy Discharge Note  Date: 2020        Patient Name:  Marius Phalen    :  1970  MRN: [id number]    Restrictions/Precautions:    Medical/Treatment Diagnosis Information:  · Diagnosis: I69.359 R cerebral infarction  ·    Insurance/Certification information:  OT Insurance Information: Medical [place]  Physician Information:  Referring Practitioner: Dr. Dickerson Favorite of care signed (Y/N):  Y  Visit# / total visits:  , , ,    Pain level:      0/10 L shoulder                G-Code (if applicable):                                             Date / Visit # G-Code Applied:  /    QuickDASH Total Score: 25 (20)       QuickDASH Disability/Symptom Score :  31.82% (20)     Time Period for Report:  10/22/  Cancels/No-shows to date:  0    Plan of Care/Treatment to date: D/C from OT  [] Therapeutic Exercise    [] Modalities:  [] Therapeutic Activity     [] Ultrasound  [] Electrical Stimulation  [] Total Motion Release     [] Fluidotherapy [] Wilmon Burrs  [] Neuromuscular Re-education  [] Cold/hotpack [] Iontophoresis  [] Instruction in HEP     Other:  [] Manual Therapy      []            [] Aquatic Therapy      []                   ? Significant Findings At Last Visit/Comments:    Subjective:     Discussed with pt plan for continued therapy. Pt and therapist have agreed to discharge from therapy at this time and allow pt to complete at home and continue at a later date in order to save therapy visits.       Objective:  Eval 10/22  ADL  Feeding: Setup(to cut food)  Grooming: Minimal assistance(assist for hair)  UE Bathing: Setup(assist for managing L UE)  LE Bathing: Minimal assistance(occssionally assist for B feet and Shoulder Ext Rotation: 1/5  L Elbow Flex: 2+/5  L Elbow Ext: 1/5  L Forearm Pron: 1/5  L Forearm Sup: 1/5  L Wrist Flex: 1/5  L Wrist Ext: 2+/5  L Hand General: 3-/5  RUE Strength  Gross RUE Strength: WFL  Hand Dominance  Hand Dominance: Right  Left Hand Strength -  (lbs)  Handle Setting 2: 5, 5, 5  Hand Assessment Comment  Hand Assessment Comment: Pt only able to complete gross grasp; unable to complete opposition or finger extension 1/9/20   ADL  Feeding: Setup(to cut food)  Grooming: Minimal assistance(assist for hair)  UE Bathing: Setup(assist for managing L UE)  LE Bathing: Minimal assistance(occssionally assist for B feet and lower leg)  UE Dressing: Minimal assistance(assist for all buttons, ties, zippers, etc)  LE Dressing: Minimal assistance  Toileting: Independent  Tone RUE  RUE Tone: Normotonic  Tone LUE  LUE Tone: Hypotonic  Coordination  Movements Are Fluid And Coordinated: No(L UE)  Coordination and Movement description: Fine motor impairments;Gross motor impairments  Functional Activity Tolerance  Functional Activity Tolerance: Endurance does not limit participation in activity  Balance  Sitting Balance: Supervision  Standing Balance: Stand by assistance(quad cane)  Functional Mobility  Functional - Mobility Device: Other(quad cane)  Assist Level: Stand by assistance  Cognition  Overall Cognitive Status: WFL  Sensation  Overall Sensation Status: Impaired(dull/tingling in L UE)  LUE PROM (degrees)  LUE PROM: Exceptions  LUE General PROM: WFL although shoulder ROM limited by pain   L Shoulder Flex  0-180: ~50 degrees  L Shoulder ABduction 0-180: ~70 degrees  LUE AROM (degrees)  LUE AROM : Exceptions  LUE General AROM: pt with noted muscle activation in shoulder, elbow, wrist, and hand  L Shoulder Flexion 0-180: 25  L Shoulder Extension 0-45: 0  L Shoulder ABduction 0-180: 45  L Shoulder Int Rotation  0-70: 0  L Shoulder Ext Rotation  0-90: 0  L Elbow Flexion 0-145: 125     L Elbow Extension 145-0: area which occured in early August, 2019. Pt continues to present with deficits including AROM, strength, and coordination in L UE/hand. Discussed with pt plan moving forward regarding therapy. Pt and therapist in agreement at this time to discharge from therapy at this time and allow pt to complete HEP Ind with pt also planning to join local gym to increase overall activity. Pt demonstrates and verbalizes understanding of HEP. Pt issued all handouts for exercises and activities. Pt plans to return to therapy in future for continued progression of ROM, strength, coordination, and functional use of L UE/hand.  Pt discharged from OT at this time.        Progress towards goals:    Goals  Short term goals  Time Frame for Short term goals: 4-6 weeks  Short term goal 1: increase all shoulder AROM by 10 degrees to increase ability to complete ADLs (Goal met 1/28) Update: Pt will increase shoulder flexion >45 degrees  Short term goal 2: increase elbow AROM by 10 degrees for increased ability to complete household chores (Goal met 11/19) Update goal to additional 10 degrees; (Goal met 12/19) Update goal to additional 10 degrees (progressing 1/28)  Short term goal 3: increase L hand  strength by 10# to increased ability to grasp objects during ADLs  (progressing 1/28)  Short term goal 4: Pt will decrease overall QuickDASH score by 5 points  (Goal met 1/28) Update goal to additional 5 points  Short term goal 5: Pt will report <5/10 pain in 3 consecutive session for increased ability to use L UE  (Goal met 12/19)  Long term goals  Time Frame for Long term goals : LTG to be decided after reaching STG  Patient Goals   Patient goals : increase overall independence to best of her ability     Updated Goals:  Short term goal 5: pt will complete stacking of x4 blocks with use of L hand with no assist for increased ability to complete fine motor tasks (Goal met 1/28)     Short term goal 6: pt will complete 9 hole peg test for

## 2020-02-18 ENCOUNTER — HOSPITAL ENCOUNTER (OUTPATIENT)
Dept: OCCUPATIONAL THERAPY | Age: 50
Setting detail: THERAPIES SERIES
End: 2020-02-18
Payer: COMMERCIAL

## 2020-02-25 ENCOUNTER — APPOINTMENT (OUTPATIENT)
Dept: OCCUPATIONAL THERAPY | Age: 50
End: 2020-02-25
Payer: COMMERCIAL

## 2020-04-27 RX ORDER — BACLOFEN 5 MG/1
TABLET ORAL
Qty: 270 TABLET | Refills: 0 | Status: SHIPPED | OUTPATIENT
Start: 2020-04-27 | End: 2020-09-29

## 2020-05-01 ENCOUNTER — TELEPHONE (OUTPATIENT)
Dept: ORTHOPEDIC SURGERY | Age: 50
End: 2020-05-01

## 2020-07-02 RX ORDER — AMLODIPINE BESYLATE 10 MG/1
10 TABLET ORAL DAILY
Qty: 90 TABLET | Refills: 0 | Status: SHIPPED | OUTPATIENT
Start: 2020-07-02 | End: 2020-09-29

## 2020-07-02 RX ORDER — ATORVASTATIN CALCIUM 80 MG/1
80 TABLET, FILM COATED ORAL NIGHTLY
Qty: 90 TABLET | Refills: 0 | Status: SHIPPED | OUTPATIENT
Start: 2020-07-02 | End: 2020-09-29

## 2020-07-02 RX ORDER — POTASSIUM CHLORIDE 750 MG/1
10 TABLET, FILM COATED, EXTENDED RELEASE ORAL
Qty: 90 TABLET | Refills: 0 | Status: SHIPPED | OUTPATIENT
Start: 2020-07-02 | End: 2020-09-29

## 2020-07-02 RX ORDER — CLOPIDOGREL BISULFATE 75 MG/1
75 TABLET ORAL DAILY
Qty: 90 TABLET | Refills: 0 | Status: SHIPPED | OUTPATIENT
Start: 2020-07-02 | End: 2020-09-29

## 2020-07-02 RX ORDER — ASPIRIN 81 MG/1
81 TABLET ORAL DAILY
Qty: 90 TABLET | Refills: 0 | Status: SHIPPED | OUTPATIENT
Start: 2020-07-02 | End: 2020-09-29 | Stop reason: SDUPTHER

## 2020-07-06 ENCOUNTER — TELEPHONE (OUTPATIENT)
Dept: FAMILY MEDICINE CLINIC | Age: 50
End: 2020-07-06

## 2020-07-06 RX ORDER — PANTOPRAZOLE SODIUM 40 MG/1
40 TABLET, DELAYED RELEASE ORAL
Qty: 90 TABLET | Refills: 0 | Status: SHIPPED | OUTPATIENT
Start: 2020-07-06 | End: 2020-09-30

## 2020-07-06 RX ORDER — CLONIDINE HYDROCHLORIDE 0.2 MG/1
0.2 TABLET ORAL 2 TIMES DAILY
Qty: 180 TABLET | Refills: 0 | Status: SHIPPED | OUTPATIENT
Start: 2020-07-06 | End: 2020-09-30

## 2020-08-07 ENCOUNTER — OFFICE VISIT (OUTPATIENT)
Dept: FAMILY MEDICINE CLINIC | Age: 50
End: 2020-08-07
Payer: MEDICAID

## 2020-08-07 VITALS
HEIGHT: 67 IN | SYSTOLIC BLOOD PRESSURE: 134 MMHG | WEIGHT: 231.8 LBS | TEMPERATURE: 98 F | DIASTOLIC BLOOD PRESSURE: 76 MMHG | OXYGEN SATURATION: 98 % | HEART RATE: 91 BPM | BODY MASS INDEX: 36.38 KG/M2

## 2020-08-07 PROCEDURE — G8417 CALC BMI ABV UP PARAM F/U: HCPCS | Performed by: FAMILY MEDICINE

## 2020-08-07 PROCEDURE — G8427 DOCREV CUR MEDS BY ELIG CLIN: HCPCS | Performed by: FAMILY MEDICINE

## 2020-08-07 PROCEDURE — 99214 OFFICE O/P EST MOD 30 MIN: CPT | Performed by: FAMILY MEDICINE

## 2020-08-07 PROCEDURE — 1036F TOBACCO NON-USER: CPT | Performed by: FAMILY MEDICINE

## 2020-08-07 ASSESSMENT — ENCOUNTER SYMPTOMS
SHORTNESS OF BREATH: 0
TROUBLE SWALLOWING: 0
DIARRHEA: 0
NAUSEA: 0
VOMITING: 0
SORE THROAT: 0
ABDOMINAL PAIN: 0
RHINORRHEA: 0

## 2020-08-07 NOTE — PROGRESS NOTES
soft.      Tenderness: There is no abdominal tenderness. Skin:     General: Skin is warm and dry. Neurological:      Mental Status: She is alert and oriented to person, place, and time. Psychiatric:         Behavior: Behavior normal.         Thought Content: Thought content normal.         Judgment: Judgment normal.         ASSESSMENT/PLAN:  1. Right sided cerebral hemisphere cerebrovascular accident (CVA) ([address]  Stable  Continue with medication  Keep appointments with specialist.   Génesis Tobar questions  - External Referral To Physical Therapy  - CBC Auto Differential; Future  - Lipid Panel; Future  - Comprehensive Metabolic Panel; Future    2. Hypertension, unspecified type  Well controlled. Discussed signs and symptoms for immediate evaluation in the ER. Reduce sodium. Monitor diet, exercise and lose weight. Keep a BP log and bring it to your next appointment. Needs to rtc in one month for BP check  - External Referral To Physical Therapy  - CBC Auto Differential; Future  - Lipid Panel; Future  - Comprehensive Metabolic Panel; Future    3. Contracture of left hand  Stable, needs new referral to PT  Continue with medication  Keep appointments with specialist.   Answered questions  - External Referral To Physical Therapy  - CBC Auto Differential; Future  - Lipid Panel; Future  - Comprehensive Metabolic Panel; Future    4. Cervical cancer screening  Referred for procedure  Educated on the importance of having this done. Answered questions  - Ivory Valenzuela MD, Gynecology, [facility]      Return in about 6 months (around 2/7/2021).

## 2020-08-14 DIAGNOSIS — I10 HYPERTENSION, UNSPECIFIED TYPE: ICD-10-CM

## 2020-08-14 DIAGNOSIS — M24.542 CONTRACTURE OF LEFT HAND: ICD-10-CM

## 2020-08-14 DIAGNOSIS — I63.9 RIGHT SIDED CEREBRAL HEMISPHERE CEREBROVASCULAR ACCIDENT (CVA) (HCC): ICD-10-CM

## 2020-08-14 LAB
A/G RATIO: 1 (ref 1.1–2.2)
ALBUMIN SERPL-MCNC: 4.2 G/DL (ref 3.4–5)
ALP BLD-CCNC: 154 U/L (ref 40–129)
ALT SERPL-CCNC: 25 U/L (ref 10–40)
ANION GAP SERPL CALCULATED.3IONS-SCNC: 11 MMOL/L (ref 3–16)
AST SERPL-CCNC: 18 U/L (ref 15–37)
BASOPHILS ABSOLUTE: 0 K/UL (ref 0–0.2)
BASOPHILS RELATIVE PERCENT: 0.4 %
BILIRUB SERPL-MCNC: 0.5 MG/DL (ref 0–1)
BUN BLDV-MCNC: 12 MG/DL (ref 7–20)
CALCIUM SERPL-MCNC: 9.5 MG/DL (ref 8.3–10.6)
CHLORIDE BLD-SCNC: 103 MMOL/L (ref 99–110)
CHOLESTEROL, TOTAL: 166 MG/DL (ref 0–199)
CO2: 24 MMOL/L (ref 21–32)
CREAT SERPL-MCNC: 0.8 MG/DL (ref 0.6–1.1)
EOSINOPHILS ABSOLUTE: 0.2 K/UL (ref 0–0.6)
EOSINOPHILS RELATIVE PERCENT: 2.6 %
GFR AFRICAN AMERICAN: >60
GFR NON-AFRICAN AMERICAN: >60
GLOBULIN: 4.1 G/DL
GLUCOSE BLD-MCNC: 116 MG/DL (ref 70–99)
HCT VFR BLD CALC: 34.1 % (ref 36–48)
HDLC SERPL-MCNC: 47 MG/DL (ref 40–60)
HEMOGLOBIN: 11 G/DL (ref 12–16)
LDL CHOLESTEROL CALCULATED: 105 MG/DL
LYMPHOCYTES ABSOLUTE: 1.9 K/UL (ref 1–5.1)
LYMPHOCYTES RELATIVE PERCENT: 29.1 %
MCH RBC QN AUTO: 24.1 PG (ref 26–34)
MCHC RBC AUTO-ENTMCNC: 32.2 G/DL (ref 31–36)
MCV RBC AUTO: 74.9 FL (ref 80–100)
MONOCYTES ABSOLUTE: 0.4 K/UL (ref 0–1.3)
MONOCYTES RELATIVE PERCENT: 6.6 %
NEUTROPHILS ABSOLUTE: 3.9 K/UL (ref 1.7–7.7)
NEUTROPHILS RELATIVE PERCENT: 61.3 %
PDW BLD-RTO: 16.8 % (ref 12.4–15.4)
PLATELET # BLD: 307 K/UL (ref 135–450)
PMV BLD AUTO: 8.6 FL (ref 5–10.5)
POTASSIUM SERPL-SCNC: 4.1 MMOL/L (ref 3.5–5.1)
RBC # BLD: 4.55 M/UL (ref 4–5.2)
SODIUM BLD-SCNC: 138 MMOL/L (ref 136–145)
TOTAL PROTEIN: 8.3 G/DL (ref 6.4–8.2)
TRIGL SERPL-MCNC: 70 MG/DL (ref 0–150)
VLDLC SERPL CALC-MCNC: 14 MG/DL
WBC # BLD: 6.4 K/UL (ref 4–11)

## 2020-08-20 ENCOUNTER — TELEPHONE (OUTPATIENT)
Dept: FAMILY MEDICINE CLINIC | Age: 50
End: 2020-08-20

## 2020-08-20 NOTE — TELEPHONE ENCOUNTER
----- Message from Teresita Boston sent at 8/20/2020  8:49 AM EDT -----  Subject: Message to Provider    QUESTIONS  Information for Provider? Pt is needing her referral to [facility] for physical   therapy faxed to them   their fax number is [phone]. Pt would like a return call when/if   this is faxed to them. She says she really appreciates this being faxed!  ---------------------------------------------------------------------------  --------------  [address]  What is the best way for the office to contact you? OK to leave message on   voicemail  Preferred Call Back Phone Number? [phone]  ---------------------------------------------------------------------------  --------------  SCRIPT ANSWERS  Relationship to Patient?  Self

## 2020-09-04 ENCOUNTER — TELEPHONE (OUTPATIENT)
Dept: FAMILY MEDICINE CLINIC | Age: 50
End: 2020-09-04

## 2020-09-29 RX ORDER — ASPIRIN 81 MG/1
81 TABLET ORAL DAILY
Qty: 90 TABLET | Refills: 0 | Status: SHIPPED | OUTPATIENT
Start: 2020-09-29 | End: 2020-12-30

## 2020-09-29 RX ORDER — ATORVASTATIN CALCIUM 80 MG/1
TABLET, FILM COATED ORAL
Qty: 90 TABLET | Refills: 0 | Status: SHIPPED | OUTPATIENT
Start: 2020-09-29 | End: 2020-12-30

## 2020-09-29 RX ORDER — BACLOFEN 5 MG/1
TABLET ORAL
Qty: 90 TABLET | Refills: 0 | Status: SHIPPED | OUTPATIENT
Start: 2020-09-29 | End: 2020-12-30

## 2020-09-29 RX ORDER — AMLODIPINE BESYLATE 10 MG/1
TABLET ORAL
Qty: 90 TABLET | Refills: 0 | Status: SHIPPED | OUTPATIENT
Start: 2020-09-29 | End: 2020-12-30

## 2020-09-29 RX ORDER — CLOPIDOGREL BISULFATE 75 MG/1
TABLET ORAL
Qty: 90 TABLET | Refills: 0 | Status: SHIPPED | OUTPATIENT
Start: 2020-09-29 | End: 2020-12-30

## 2020-09-29 RX ORDER — POTASSIUM CHLORIDE 750 MG/1
TABLET, FILM COATED, EXTENDED RELEASE ORAL
Qty: 90 TABLET | Refills: 0 | Status: SHIPPED | OUTPATIENT
Start: 2020-09-29 | End: 2020-12-30

## 2020-09-30 RX ORDER — CLONIDINE HYDROCHLORIDE 0.2 MG/1
TABLET ORAL
Qty: 180 TABLET | Refills: 0 | Status: SHIPPED | OUTPATIENT
Start: 2020-09-30 | End: 2020-12-30

## 2020-09-30 RX ORDER — PANTOPRAZOLE SODIUM 40 MG/1
TABLET, DELAYED RELEASE ORAL
Qty: 90 TABLET | Refills: 0 | Status: SHIPPED | OUTPATIENT
Start: 2020-09-30 | End: 2020-12-30

## 2020-12-30 RX ORDER — POTASSIUM CHLORIDE 750 MG/1
TABLET, FILM COATED, EXTENDED RELEASE ORAL
Qty: 90 TABLET | Refills: 0 | Status: SHIPPED | OUTPATIENT
Start: 2020-12-30 | End: 2021-03-29

## 2020-12-30 RX ORDER — CLOPIDOGREL BISULFATE 75 MG/1
TABLET ORAL
Qty: 90 TABLET | Refills: 0 | Status: SHIPPED | OUTPATIENT
Start: 2020-12-30 | End: 2021-03-29

## 2020-12-30 RX ORDER — ATORVASTATIN CALCIUM 80 MG/1
TABLET, FILM COATED ORAL
Qty: 90 TABLET | Refills: 0 | Status: SHIPPED | OUTPATIENT
Start: 2020-12-30 | End: 2021-03-29

## 2020-12-30 RX ORDER — PANTOPRAZOLE SODIUM 40 MG/1
TABLET, DELAYED RELEASE ORAL
Qty: 90 TABLET | Refills: 0 | Status: SHIPPED | OUTPATIENT
Start: 2020-12-30 | End: 2021-03-29

## 2020-12-30 RX ORDER — CLONIDINE HYDROCHLORIDE 0.2 MG/1
TABLET ORAL
Qty: 180 TABLET | Refills: 0 | Status: SHIPPED | OUTPATIENT
Start: 2020-12-30 | End: 2021-03-29

## 2020-12-30 RX ORDER — BACLOFEN 5 MG/1
TABLET ORAL
Qty: 90 TABLET | Refills: 0 | Status: SHIPPED | OUTPATIENT
Start: 2020-12-30 | End: 2021-03-29

## 2020-12-30 RX ORDER — AMLODIPINE BESYLATE 10 MG/1
TABLET ORAL
Qty: 90 TABLET | Refills: 0 | Status: SHIPPED | OUTPATIENT
Start: 2020-12-30 | End: 2021-03-29

## 2020-12-30 RX ORDER — ASPIRIN 81 MG/1
TABLET, COATED ORAL
Qty: 30 TABLET | Refills: 0 | Status: SHIPPED | OUTPATIENT
Start: 2020-12-30 | End: 2021-01-29

## 2021-01-29 RX ORDER — ASPIRIN 81 MG/1
TABLET, COATED ORAL
Qty: 30 TABLET | Refills: 0 | Status: SHIPPED | OUTPATIENT
Start: 2021-01-29 | End: 2021-03-02

## 2021-02-20 NOTE — PROGRESS NOTES
Occupational Therapy        Outpatient Occupational Therapy  [] [facility] [address] SUSANNE ELLIS   Phone: [phone]   Fax: [phone]   [x] [facility]           Phone: [phone]   Fax: [phone]  [] [facility]   Phone: [phone]   Fax: [phone]     Occupational Therapy Progress Note  Date: 2020        Patient Name:  Dani Irwin    :  1970  MRN: [id number]    Restrictions/Precautions:    Medical/Treatment Diagnosis Information:  · Diagnosis: I69.359 R cerebral infarction  ·    Insurance/Certification information:  OT Insurance Information: Medical [place]  Physician Information:  Referring Practitioner: Dr. Antonietta Salinas of care signed (Y/N):  Y  Visit# / total visits:  , ,    Pain level:      0/10 L shoulder                G-Code (if applicable):                                             Date / Visit # G-Code Applied:  /    QuickDASH Total Score: 27 (20)       QuickDASH Disability/Symptom Score :  36.36% (19)     Time Period for Report:  10/22/19-20  Cancels/No-shows to date:  0    Plan of Care/Treatment to date:  [x] Therapeutic Exercise    [x] Modalities:  [x] Therapeutic Activity     [] Ultrasound  [x] Electrical Stimulation  [] Total Motion Release     [] Fluidotherapy [] Derick Carroll  [x] Neuromuscular Re-education  [] Cold/hotpack [] Iontophoresis  [x] Instruction in HEP     Other:  [x] Manual Therapy      []            [] Aquatic Therapy      []                   ? Significant Findings At Last Visit/Comments:    Subjective:     Pt reports no significant changes. Pt reports she continues to complete HEP daily.       Objective:  Eval 10/22  ADL  Feeding: Setup(to cut food)  Grooming: Minimal assistance(assist for hair)  UE Bathing: Setup(assist for managing L UE)  LE Bathing: Minimal assistance(occssionally assist for B feet and lower leg)  UE Dressing: Minimal assistance(assist for all buttons, ties, zippers, etc)  LE Dressing: Minimal assistance  Toileting: Independent  Tone RUE  RUE Tone: Normotonic  Tone LUE  LUE Tone: Hypotonic  Coordination  Movements Are Fluid And Coordinated: No(L UE)  Coordination and Movement description: Fine motor impairments;Gross motor impairments  Functional Activity Tolerance  Functional Activity Tolerance: Endurance does not limit participation in activity  Balance  Sitting Balance: Supervision  Standing Balance: Stand by assistance(quad cane)  Functional Mobility  Functional - Mobility Device: Other(quad cane)  Assist Level: Stand by assistance  Cognition  Overall Cognitive Status: WFL  Sensation  Overall Sensation Status: Impaired(dull/tingling in L UE)  LUE PROM (degrees)  LUE PROM: Exceptions  LUE General PROM: WFL although shoulder ROM limited by pain   L Shoulder Flex  0-180: ~50 degrees  L Shoulder ABduction 0-180: ~70 degrees  LUE AROM (degrees)  LUE AROM : Exceptions  LUE General AROM: pt with noted muscle activation in shoulder, elbow, wrist, and hand  L Shoulder Flexion 0-180: 0  L Shoulder Extension 0-45: 0  L Shoulder ABduction 0-180: 0  L Shoulder Int Rotation  0-70: 0  L Shoulder Ext Rotation  0-90: 0  L Elbow Flexion 0-145: 25  L Elbow Extension 145-0: 0  L Forearm Pron 0-90: 0  L Forearm Supination  0-90: 0  L Wrist Flexion 0-80: 0  L Wrist Extension 0-70: 10   Left Hand PROM (degrees)  Left Hand PROM: WFL  Left Hand AROM (degrees)  Left Hand AROM: Exceptions  Left Hand General AROM: WFL for completing fist but unable to complete extension  RUE PROM (degrees)  RUE PROM: WFL  RUE AROM (degrees)  RUE AROM : WFL  Right Hand PROM (degrees)  Right Hand PROM: WFL  Right Hand AROM (degrees)  Right Hand AROM: WFL  LUE Strength  Gross LUE Strength: Exceptions to [facility]  L Shoulder Flex: 2-/5  L Shoulder Ext: 2-/5  L Shoulder ABduction: 2-/5  L Shoulder ADduction: 2-/5  L Shoulder Int Rotation: 1/5  L Shoulder Ext Rotation: 1/5  L Elbow Flex: 2+/5  L Elbow Ext: 1/5  L Forearm Pron: 1/5  L Forearm Sup: 1/5  L Wrist Flex: 1/5  L Wrist 8 Hand PROM (degrees)  Left Hand PROM: WFL  Left Hand AROM (degrees)  Left Hand AROM: Exceptions  Left Hand General AROM: WFL for completing fist but unable to complete extension  RUE PROM (degrees)  RUE PROM: WFL  RUE AROM (degrees)  RUE AROM : WFL  Right Hand PROM (degrees)  Right Hand PROM: WFL  Right Hand AROM (degrees)  Right Hand AROM: WFL  LUE Strength  Gross LUE Strength: Exceptions to [facility]  L Shoulder Flex: 2-/5  L Shoulder Ext: 2-/5  L Shoulder ABduction: 2-/5  L Shoulder ADduction: 2-/5  L Shoulder Int Rotation: 1/5  L Shoulder Ext Rotation: 1/5  L Elbow Flex: 2+/5  L Elbow Ext: 1/5  L Forearm Pron: 1/5  L Forearm Sup: 1/5  L Wrist Flex: 1/5  L Wrist Ext: 2+/5  L Hand General: 3-/5  RUE Strength  Gross RUE Strength: WFL  Hand Dominance  Hand Dominance: Right  Left Hand Strength -  (lbs)  Handle Setting 2: 7, 8, 7  Hand Assessment Comment  Hand Assessment Comment: Pt only able to complete gross grasp; unable to complete opposition or finger extension 1/9/20   ADL  Feeding: Setup(to cut food)  Grooming: Minimal assistance(assist for hair)  UE Bathing: Setup(assist for managing L UE)  LE Bathing: Minimal assistance(occssionally assist for B feet and lower leg)  UE Dressing: Minimal assistance(assist for all buttons, ties, zippers, etc)  LE Dressing: Minimal assistance  Toileting: Independent  Tone RUE  RUE Tone: Normotonic  Tone LUE  LUE Tone: Hypotonic  Coordination  Movements Are Fluid And Coordinated: No(L UE)  Coordination and Movement description: Fine motor impairments;Gross motor impairments  Functional Activity Tolerance  Functional Activity Tolerance: Endurance does not limit participation in activity  Balance  Sitting Balance: Supervision  Standing Balance: Stand by assistance(quad cane)  Functional Mobility  Functional - Mobility Device: Other(quad cane)  Assist Level: Stand by assistance  Cognition  Overall Cognitive Status: WFL  Sensation  Overall Sensation Status: Impaired(dull/tingling in L and ADLs. Pt able to complete donning and doffing of sling with min cues and no physical assist this date x2 times using different techniques. Pt demonstrates increased consistency with AROM of L hand including grasp/release, opposition, and ability to grasp objects via tip tip pinch. Pt has also progressed with AROM in L UE including shoulder, elbow, forearm, and hand. Pt continues to increase ability to complete HEP with assist from her mother and family while completing daily. Pt continues to benefit from skilled OT services at this time to increase overall independence and use of L UE. Progress towards goals:    Goals  Short term goals  Time Frame for Short term goals: 4-6 weeks  Short term goal 1: increase all shoulder AROM by 10 degrees to increase ability to complete ADLs (Goal met 1/9) Update additional 10 degrees  Short term goal 2: increase elbow AROM by 10 degrees for increased ability to complete household chores (Goal met 11/19) Update goal to additional 10 degrees; (Goal met 12/19) Update goal to additional 10 degrees (progressing 1/9)  Short term goal 3: increase L hand  strength by 10# to increased ability to grasp objects during ADLs  (progressing 1/9)  Short term goal 4: Pt will decrease overall QuickDASH score by 5 points  (progressing 1/9)  Short term goal 5: Pt will report <5/10 pain in 3 consecutive session for increased ability to use L UE  (Goal met 12/19)    Updated Goals:  Short term goal 5: pt will complete stacking of x4 blocks with use of L hand with no assist for increased ability to complete fine motor tasks    Short term goal 6: pt will complete 9 hole peg test for increased ability to complete fine motor tasks    Short term goal 7: pt will sign up and complete driving evaluation for 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      Long term goals  Time Frame for Long term goals : LTG to be decided after reaching STG  Patient Goals   Patient goals : increase overall

## 2021-03-02 RX ORDER — ASPIRIN 81 MG/1
TABLET, COATED ORAL
Qty: 30 TABLET | Refills: 0 | Status: SHIPPED | OUTPATIENT
Start: 2021-03-02 | End: 2021-03-29

## 2021-03-29 RX ORDER — ASPIRIN 81 MG/1
TABLET, COATED ORAL
Qty: 30 TABLET | Refills: 0 | Status: SHIPPED | OUTPATIENT
Start: 2021-03-29 | End: 2021-04-30

## 2021-03-29 RX ORDER — CLONIDINE HYDROCHLORIDE 0.2 MG/1
TABLET ORAL
Qty: 180 TABLET | Refills: 0 | Status: SHIPPED | OUTPATIENT
Start: 2021-03-29 | End: 2021-07-01

## 2021-03-29 RX ORDER — POTASSIUM CHLORIDE 750 MG/1
TABLET, FILM COATED, EXTENDED RELEASE ORAL
Qty: 90 TABLET | Refills: 0 | Status: SHIPPED | OUTPATIENT
Start: 2021-03-29 | End: 2021-07-02

## 2021-03-29 RX ORDER — AMLODIPINE BESYLATE 10 MG/1
TABLET ORAL
Qty: 90 TABLET | Refills: 0 | Status: SHIPPED | OUTPATIENT
Start: 2021-03-29 | End: 2021-07-02

## 2021-03-29 RX ORDER — ATORVASTATIN CALCIUM 80 MG/1
TABLET, FILM COATED ORAL
Qty: 90 TABLET | Refills: 0 | Status: SHIPPED | OUTPATIENT
Start: 2021-03-29 | End: 2021-07-01

## 2021-03-29 RX ORDER — PANTOPRAZOLE SODIUM 40 MG/1
TABLET, DELAYED RELEASE ORAL
Qty: 90 TABLET | Refills: 0 | Status: SHIPPED | OUTPATIENT
Start: 2021-03-29 | End: 2021-07-01

## 2021-03-29 RX ORDER — CLOPIDOGREL BISULFATE 75 MG/1
TABLET ORAL
Qty: 90 TABLET | Refills: 0 | Status: SHIPPED | OUTPATIENT
Start: 2021-03-29 | End: 2021-07-01

## 2021-03-29 RX ORDER — BACLOFEN 5 MG/1
TABLET ORAL
Qty: 90 TABLET | Refills: 0 | Status: SHIPPED | OUTPATIENT
Start: 2021-03-29 | End: 2021-07-06

## 2021-04-30 RX ORDER — ASPIRIN 81 MG/1
TABLET, COATED ORAL
Qty: 30 TABLET | Refills: 0 | Status: SHIPPED | OUTPATIENT
Start: 2021-04-30 | End: 2021-06-01

## 2021-06-01 RX ORDER — ASPIRIN 81 MG/1
TABLET, COATED ORAL
Qty: 30 TABLET | Refills: 0 | Status: SHIPPED | OUTPATIENT
Start: 2021-06-01 | End: 2021-07-01

## 2021-07-01 RX ORDER — ASPIRIN 81 MG/1
TABLET, COATED ORAL
Qty: 30 TABLET | Refills: 0 | Status: SHIPPED | OUTPATIENT
Start: 2021-07-01 | End: 2021-08-03

## 2021-07-01 RX ORDER — ATORVASTATIN CALCIUM 80 MG/1
TABLET, FILM COATED ORAL
Qty: 90 TABLET | Refills: 0 | Status: SHIPPED | OUTPATIENT
Start: 2021-07-01 | End: 2021-10-08

## 2021-07-01 RX ORDER — PANTOPRAZOLE SODIUM 40 MG/1
TABLET, DELAYED RELEASE ORAL
Qty: 90 TABLET | Refills: 0 | Status: SHIPPED | OUTPATIENT
Start: 2021-07-01 | End: 2021-10-08

## 2021-07-01 RX ORDER — CLONIDINE HYDROCHLORIDE 0.2 MG/1
TABLET ORAL
Qty: 180 TABLET | Refills: 0 | Status: SHIPPED | OUTPATIENT
Start: 2021-07-01 | End: 2021-10-08

## 2021-07-01 RX ORDER — CLOPIDOGREL BISULFATE 75 MG/1
TABLET ORAL
Qty: 90 TABLET | Refills: 0 | Status: SHIPPED | OUTPATIENT
Start: 2021-07-01 | End: 2021-10-08

## 2021-07-02 RX ORDER — POTASSIUM CHLORIDE 750 MG/1
TABLET, FILM COATED, EXTENDED RELEASE ORAL
Qty: 90 TABLET | Refills: 0 | Status: SHIPPED | OUTPATIENT
Start: 2021-07-02 | End: 2021-10-08

## 2021-07-02 RX ORDER — AMLODIPINE BESYLATE 10 MG/1
TABLET ORAL
Qty: 90 TABLET | Refills: 0 | Status: SHIPPED | OUTPATIENT
Start: 2021-07-02 | End: 2021-09-17 | Stop reason: ALTCHOICE

## 2021-07-06 RX ORDER — BACLOFEN 5 MG/1
TABLET ORAL
Qty: 90 TABLET | Refills: 0 | Status: SHIPPED | OUTPATIENT
Start: 2021-07-06 | End: 2021-10-08

## 2021-08-03 RX ORDER — ASPIRIN 81 MG/1
TABLET, COATED ORAL
Qty: 30 TABLET | Refills: 0 | Status: SHIPPED | OUTPATIENT
Start: 2021-08-03 | End: 2021-09-09

## 2021-08-10 ENCOUNTER — OFFICE VISIT (OUTPATIENT)
Dept: FAMILY MEDICINE CLINIC | Age: 51
End: 2021-08-10
Payer: COMMERCIAL

## 2021-08-10 VITALS
DIASTOLIC BLOOD PRESSURE: 90 MMHG | SYSTOLIC BLOOD PRESSURE: 170 MMHG | WEIGHT: 220.6 LBS | HEART RATE: 88 BPM | OXYGEN SATURATION: 98 % | BODY MASS INDEX: 34.55 KG/M2

## 2021-08-10 DIAGNOSIS — I63.9 RIGHT SIDED CEREBRAL HEMISPHERE CEREBROVASCULAR ACCIDENT (CVA) (HCC): Primary | ICD-10-CM

## 2021-08-10 DIAGNOSIS — M24.542 CONTRACTURE OF LEFT HAND: ICD-10-CM

## 2021-08-10 DIAGNOSIS — I10 HYPERTENSION, UNSPECIFIED TYPE: ICD-10-CM

## 2021-08-10 PROCEDURE — G8427 DOCREV CUR MEDS BY ELIG CLIN: HCPCS | Performed by: FAMILY MEDICINE

## 2021-08-10 PROCEDURE — G8417 CALC BMI ABV UP PARAM F/U: HCPCS | Performed by: FAMILY MEDICINE

## 2021-08-10 PROCEDURE — 1036F TOBACCO NON-USER: CPT | Performed by: FAMILY MEDICINE

## 2021-08-10 PROCEDURE — 99214 OFFICE O/P EST MOD 30 MIN: CPT | Performed by: FAMILY MEDICINE

## 2021-08-10 PROCEDURE — 3017F COLORECTAL CA SCREEN DOC REV: CPT | Performed by: FAMILY MEDICINE

## 2021-08-10 RX ORDER — LOSARTAN POTASSIUM AND HYDROCHLOROTHIAZIDE 12.5; 5 MG/1; MG/1
1 TABLET ORAL DAILY
Qty: 30 TABLET | Refills: 3 | Status: SHIPPED | OUTPATIENT
Start: 2021-08-10 | End: 2021-09-17 | Stop reason: SDUPTHER

## 2021-08-10 SDOH — ECONOMIC STABILITY: FOOD INSECURITY: WITHIN THE PAST 12 MONTHS, YOU WORRIED THAT YOUR FOOD WOULD RUN OUT BEFORE YOU GOT MONEY TO BUY MORE.: NEVER TRUE

## 2021-08-10 SDOH — ECONOMIC STABILITY: FOOD INSECURITY: WITHIN THE PAST 12 MONTHS, THE FOOD YOU BOUGHT JUST DIDN'T LAST AND YOU DIDN'T HAVE MONEY TO GET MORE.: NEVER TRUE

## 2021-08-10 ASSESSMENT — PATIENT HEALTH QUESTIONNAIRE - PHQ9
SUM OF ALL RESPONSES TO PHQ QUESTIONS 1-9: 0
1. LITTLE INTEREST OR PLEASURE IN DOING THINGS: 0
2. FEELING DOWN, DEPRESSED OR HOPELESS: 0
SUM OF ALL RESPONSES TO PHQ QUESTIONS 1-9: 0
SUM OF ALL RESPONSES TO PHQ QUESTIONS 1-9: 0
SUM OF ALL RESPONSES TO PHQ9 QUESTIONS 1 & 2: 0

## 2021-08-10 ASSESSMENT — SOCIAL DETERMINANTS OF HEALTH (SDOH): HOW HARD IS IT FOR YOU TO PAY FOR THE VERY BASICS LIKE FOOD, HOUSING, MEDICAL CARE, AND HEATING?: NOT HARD AT ALL

## 2021-08-10 NOTE — PROGRESS NOTES
8/10/2021     Oscar Mims (:  1970) is a 48 y.o. female, here for evaluation of the following medical concerns:    HPI     1.  CVA- patient is needing referral to new PT due to insurance not covering rehab at this itme. , patient continues with outpatient PT/OT and believes she has improved, no longer is restricted to wheelchair or a cane, patient is still limited on her left side particularly left hand and arm, arm is in sling today for protection, communication has improved.  She follows with Dr. Harshad Valentine, Physical Medicine and Lydia Saha continues off from work and understands her limitations at this time. Katelyn Celeste does have a backup plan if she is unable to return which in my opinion is likely.      2.  HTN- poorly controlled at this time, taking medication as prescribed, no side effects reported from the medication. Will add medication.       3.  Allergic rhinitis- she is complaining of rhinorrhea and would like an antihistamine due to the continued nasal drip.      Today, she denied chest pain, sob, n, v, or diarrhea.   Review of Systems   Constitutional: Positive for fatigue. Negative for activity change, fever and unexpected weight change. HENT: Negative for congestion, ear pain, facial swelling, rhinorrhea and sinus pressure. Respiratory: Negative for cough and shortness of breath. Cardiovascular: Negative for chest pain and leg swelling. Gastrointestinal: Negative for abdominal pain, diarrhea, nausea and vomiting. Endocrine: Negative for cold intolerance, heat intolerance, polydipsia and polyphagia. Musculoskeletal: Positive for arthralgias, back pain and gait problem. Skin: Negative for rash. Neurological: Positive for facial asymmetry and weakness. Negative for dizziness, light-headedness, numbness and headaches. Psychiatric/Behavioral: Negative for dysphoric mood. The patient is not nervous/anxious. Prior to Visit Medications    Medication Sig Taking?  Authorizing Provider   losartan-hydroCHLOROthiazide (HYZAAR) 50-12.5 MG per tablet Take 1 tablet by mouth daily Yes Gurjit Addison DO   ASPIRIN LOW DOSE 81 MG EC tablet TAKE ONE TABLET BY MOUTH DAILY Yes Gurjit Addison DO   Baclofen (LIORESAL) 5 MG tablet TAKE ONE TABLET BY MOUTH THREE TIMES A DAY AS NEEDED FOR MUSCLE SPASMS Yes Gurjit Addison, DO   amLODIPine (NORVASC) 10 MG tablet TAKE ONE TABLET BY MOUTH DAILY Yes Gurjit Addison DO   potassium chloride (KLOR-CON) 10 MEQ extended release tablet TAKE ONE TABLET BY MOUTH DAILY WITH BREAKFAST Yes Gurjit Addison DO   cloNIDine (CATAPRES) 0.2 MG tablet TAKE ONE TABLET BY MOUTH TWICE A DAY Yes Gurjit Addison DO   pantoprazole (PROTONIX) 40 MG tablet TAKE ONE TABLET BY MOUTH EVERY MORNING BEFORE BREAKFAST Yes Gurjit Addison DO   atorvastatin (LIPITOR) 80 MG tablet TAKE ONE TABLET BY MOUTH ONCE NIGHTLY Yes Kris Addison DO   clopidogrel (PLAVIX) 75 MG tablet TAKE ONE TABLET BY MOUTH DAILY Yes Esther Stewart DO   Docusate Calcium (STOOL SOFTENER PO) Take by mouth Yes Historical Provider, MD        Social History     Tobacco Use    Smoking status: Never Smoker    Smokeless tobacco: Never Used   Substance Use Topics    Alcohol use: No        Vitals:    08/10/21 1455 08/10/21 1509   BP: (!) 210/102 (!) 170/90   Site: Right Upper Arm    Position: Sitting    Cuff Size: Medium Adult    Pulse: 88    SpO2: 98%    Weight: 220 lb 9.6 oz (100.1 kg)      Estimated body mass index is 34.55 kg/m² as calculated from the following:    Height as of 8/7/20: 5' 7\" (1.702 m). Weight as of this encounter: 220 lb 9.6 oz (100.1 kg). Physical Exam  Vitals and nursing note reviewed. Constitutional:       Appearance: She is well-developed. HENT:      Head: Normocephalic and atraumatic.       Right Ear: Tympanic membrane, ear canal and external ear normal.      Left Ear: Tympanic membrane, ear canal and external ear normal.   Cardiovascular:      Rate and Rhythm: Normal rate and regular rhythm. Heart sounds: Normal heart sounds. No murmur heard. Pulmonary:      Effort: Pulmonary effort is normal.      Breath sounds: Normal breath sounds. No wheezing. Abdominal:      General: Bowel sounds are normal.      Palpations: Abdomen is soft. Tenderness: There is no abdominal tenderness. Musculoskeletal:         General: Deformity present. No tenderness. Skin:     General: Skin is warm and dry. Neurological:      Mental Status: She is alert and oriented to person, place, and time. Psychiatric:         Behavior: Behavior normal.         ASSESSMENT/PLAN:  1. Right sided cerebral hemisphere cerebrovascular accident (CVA) ([address]  Stable  Continue with medication  Keep appointments with specialist.   Hollie Samano questions  - Lipid Panel; Future    2. Hypertension, unspecified type  Difficult and poorly controlled. Discussed signs and symptoms for immediate evaluation in the ER. Reduce sodium. Monitor diet, exercise and lose weight. Keep a BP log and bring it to your next appointment. Needs to rtc in several weeks for BP check  Needs to let us know what her home numbers are VERY IMPORTANT  - Lipid Panel; Future    3. Contracture of left hand  Stable  Continue with medication  Keep appointments with specialist for PT  Answered questions      Return in about 4 weeks (around 9/7/2021).

## 2021-08-11 ENCOUNTER — NURSE ONLY (OUTPATIENT)
Dept: FAMILY MEDICINE CLINIC | Age: 51
End: 2021-08-11
Payer: COMMERCIAL

## 2021-08-11 DIAGNOSIS — D64.9 ANEMIA, UNSPECIFIED TYPE: ICD-10-CM

## 2021-08-11 DIAGNOSIS — R29.898 LEFT ARM WEAKNESS: ICD-10-CM

## 2021-08-11 DIAGNOSIS — I63.9 RIGHT SIDED CEREBRAL HEMISPHERE CEREBROVASCULAR ACCIDENT (CVA) (HCC): ICD-10-CM

## 2021-08-11 DIAGNOSIS — I10 HYPERTENSION, UNSPECIFIED TYPE: Primary | ICD-10-CM

## 2021-08-11 LAB
A/G RATIO: 1 (ref 1.1–2.2)
ALBUMIN SERPL-MCNC: 3.8 G/DL (ref 3.4–5)
ALP BLD-CCNC: 109 U/L (ref 40–129)
ALT SERPL-CCNC: 12 U/L (ref 10–40)
ANION GAP SERPL CALCULATED.3IONS-SCNC: 13 MMOL/L (ref 3–16)
AST SERPL-CCNC: 12 U/L (ref 15–37)
BILIRUB SERPL-MCNC: 0.7 MG/DL (ref 0–1)
BUN BLDV-MCNC: 10 MG/DL (ref 7–20)
CALCIUM SERPL-MCNC: 9.4 MG/DL (ref 8.3–10.6)
CHLORIDE BLD-SCNC: 103 MMOL/L (ref 99–110)
CHOLESTEROL, FASTING: 206 MG/DL (ref 0–199)
CO2: 25 MMOL/L (ref 21–32)
CREAT SERPL-MCNC: 0.8 MG/DL (ref 0.6–1.1)
GFR AFRICAN AMERICAN: >60
GFR NON-AFRICAN AMERICAN: >60
GLOBULIN: 3.8 G/DL
GLUCOSE BLD-MCNC: 131 MG/DL (ref 70–99)
HCT VFR BLD CALC: 34.2 % (ref 36–48)
HDLC SERPL-MCNC: 45 MG/DL (ref 40–60)
HEMOGLOBIN: 11.4 G/DL (ref 12–16)
LDL CHOLESTEROL CALCULATED: 141 MG/DL
MCH RBC QN AUTO: 26.3 PG (ref 26–34)
MCHC RBC AUTO-ENTMCNC: 33.2 G/DL (ref 31–36)
MCV RBC AUTO: 79.3 FL (ref 80–100)
PDW BLD-RTO: 16.3 % (ref 12.4–15.4)
PLATELET # BLD: 225 K/UL (ref 135–450)
PMV BLD AUTO: 9 FL (ref 5–10.5)
POTASSIUM SERPL-SCNC: 3.6 MMOL/L (ref 3.5–5.1)
RBC # BLD: 4.32 M/UL (ref 4–5.2)
SODIUM BLD-SCNC: 141 MMOL/L (ref 136–145)
TOTAL PROTEIN: 7.6 G/DL (ref 6.4–8.2)
TRIGLYCERIDE, FASTING: 101 MG/DL (ref 0–150)
TSH REFLEX FT4: 1.15 UIU/ML (ref 0.27–4.2)
VLDLC SERPL CALC-MCNC: 20 MG/DL
WBC # BLD: 4.5 K/UL (ref 4–11)

## 2021-08-11 PROCEDURE — 36415 COLL VENOUS BLD VENIPUNCTURE: CPT | Performed by: FAMILY MEDICINE

## 2021-08-19 ENCOUNTER — TELEPHONE (OUTPATIENT)
Dept: FAMILY MEDICINE CLINIC | Age: 51
End: 2021-08-19

## 2021-08-19 ASSESSMENT — ENCOUNTER SYMPTOMS
NAUSEA: 0
COUGH: 0
DIARRHEA: 0
ABDOMINAL PAIN: 0
BACK PAIN: 1
RHINORRHEA: 0
SHORTNESS OF BREATH: 0
VOMITING: 0
FACIAL SWELLING: 0
SINUS PRESSURE: 0

## 2021-09-09 RX ORDER — ASPIRIN 81 MG/1
TABLET, COATED ORAL
Qty: 30 TABLET | Refills: 0 | Status: SHIPPED | OUTPATIENT
Start: 2021-09-09 | End: 2021-10-08

## 2021-09-17 ENCOUNTER — OFFICE VISIT (OUTPATIENT)
Dept: FAMILY MEDICINE CLINIC | Age: 51
End: 2021-09-17
Payer: COMMERCIAL

## 2021-09-17 VITALS
SYSTOLIC BLOOD PRESSURE: 136 MMHG | HEART RATE: 90 BPM | WEIGHT: 220.8 LBS | BODY MASS INDEX: 34.58 KG/M2 | OXYGEN SATURATION: 99 % | DIASTOLIC BLOOD PRESSURE: 84 MMHG

## 2021-09-17 DIAGNOSIS — I10 HYPERTENSION, UNSPECIFIED TYPE: Primary | ICD-10-CM

## 2021-09-17 DIAGNOSIS — Z12.11 COLON CANCER SCREENING: ICD-10-CM

## 2021-09-17 DIAGNOSIS — I63.9 RIGHT SIDED CEREBRAL HEMISPHERE CEREBROVASCULAR ACCIDENT (CVA) (HCC): ICD-10-CM

## 2021-09-17 DIAGNOSIS — M24.531: ICD-10-CM

## 2021-09-17 DIAGNOSIS — Z12.39 ENCOUNTER FOR SCREENING FOR MALIGNANT NEOPLASM OF BREAST, UNSPECIFIED SCREENING MODALITY: ICD-10-CM

## 2021-09-17 LAB
ANION GAP SERPL CALCULATED.3IONS-SCNC: 16 MMOL/L (ref 3–16)
BUN BLDV-MCNC: 10 MG/DL (ref 7–20)
CALCIUM SERPL-MCNC: 9.4 MG/DL (ref 8.3–10.6)
CHLORIDE BLD-SCNC: 98 MMOL/L (ref 99–110)
CO2: 24 MMOL/L (ref 21–32)
CREAT SERPL-MCNC: 0.7 MG/DL (ref 0.6–1.1)
GFR AFRICAN AMERICAN: >60
GFR NON-AFRICAN AMERICAN: >60
GLUCOSE BLD-MCNC: 136 MG/DL (ref 70–99)
POTASSIUM SERPL-SCNC: 4.2 MMOL/L (ref 3.5–5.1)
SODIUM BLD-SCNC: 138 MMOL/L (ref 136–145)

## 2021-09-17 PROCEDURE — 99214 OFFICE O/P EST MOD 30 MIN: CPT | Performed by: FAMILY MEDICINE

## 2021-09-17 PROCEDURE — 36415 COLL VENOUS BLD VENIPUNCTURE: CPT | Performed by: FAMILY MEDICINE

## 2021-09-17 PROCEDURE — G8417 CALC BMI ABV UP PARAM F/U: HCPCS | Performed by: FAMILY MEDICINE

## 2021-09-17 PROCEDURE — 1036F TOBACCO NON-USER: CPT | Performed by: FAMILY MEDICINE

## 2021-09-17 PROCEDURE — G8427 DOCREV CUR MEDS BY ELIG CLIN: HCPCS | Performed by: FAMILY MEDICINE

## 2021-09-17 PROCEDURE — 3017F COLORECTAL CA SCREEN DOC REV: CPT | Performed by: FAMILY MEDICINE

## 2021-09-17 RX ORDER — LOSARTAN POTASSIUM AND HYDROCHLOROTHIAZIDE 12.5; 5 MG/1; MG/1
1 TABLET ORAL DAILY
Qty: 30 TABLET | Refills: 3 | Status: SHIPPED | OUTPATIENT
Start: 2021-09-17 | End: 2022-04-12

## 2021-09-17 ASSESSMENT — ENCOUNTER SYMPTOMS
ABDOMINAL PAIN: 0
RHINORRHEA: 0
NAUSEA: 0
DIARRHEA: 0
SORE THROAT: 0
VOMITING: 0
COUGH: 0
SHORTNESS OF BREATH: 0

## 2021-09-17 NOTE — PROGRESS NOTES
2021     Yu Chappell (:  1970) is a 46 y.o. female, here for evaluation of the following medical concerns:    HPI  1.  CVA- patient is needing referral to new PT due to insurance not covering rehab at this itme. , patient continues with outpatient PT/OT and believes she has improved, no longer is restricted to wheelchair or a cane, patient is still limited on her left side particularly left hand and arm, arm is in sling today for protection, communication has improved.  She follows with Dr. Sara Heath, Physical Medicine and Minh Dudley continues off from work and understands her limitations at this time. Carla Juarez does have a backup plan if she is unable to return which in my opinion is likely.      2.  HTN-  controlled at this time, much improved, taking medication as prescribed, no side effects reported from the medication. today, denied headache, vision change, or dizziness.       3.  Allergic rhinitis- she is complaining of rhinorrhea and would like an antihistamine due to the continued nasal drip. 4.  Needs screening     Today, she denied chest pain, sob, n, v, or diarrhea.   Review of Systems   Constitutional: Negative for activity change, fatigue, fever and unexpected weight change. HENT: Negative for congestion, ear pain, rhinorrhea and sore throat. Respiratory: Negative for cough and shortness of breath. Cardiovascular: Negative for chest pain and leg swelling. Gastrointestinal: Negative for abdominal pain, diarrhea, nausea and vomiting. Endocrine: Negative for cold intolerance, heat intolerance, polydipsia and polyphagia. Musculoskeletal: Positive for arthralgias, joint swelling and myalgias. Skin: Negative for rash. Neurological: Negative for dizziness, numbness and headaches. Psychiatric/Behavioral: Negative for dysphoric mood. The patient is not nervous/anxious. Prior to Visit Medications    Medication Sig Taking?  Authorizing Provider losartan-hydroCHLOROthiazide (HYZAAR) 50-12.5 MG per tablet Take 1 tablet by mouth daily Yes Gurjit Addison DO   ASPIRIN LOW DOSE 81 MG EC tablet TAKE ONE TABLET BY MOUTH DAILY Yes Gurjit Addison DO   Baclofen (LIORESAL) 5 MG tablet TAKE ONE TABLET BY MOUTH THREE TIMES A DAY AS NEEDED FOR MUSCLE SPASMS Yes Gurjit Addison, DO   potassium chloride (KLOR-CON) 10 MEQ extended release tablet TAKE ONE TABLET BY MOUTH DAILY WITH BREAKFAST Yes Gurjit Addison DO   cloNIDine (CATAPRES) 0.2 MG tablet TAKE ONE TABLET BY MOUTH TWICE A DAY Yes Gurjit Addison DO   pantoprazole (PROTONIX) 40 MG tablet TAKE ONE TABLET BY MOUTH EVERY MORNING BEFORE BREAKFAST Yes Gurjit Addison DO   atorvastatin (LIPITOR) 80 MG tablet TAKE ONE TABLET BY MOUTH ONCE NIGHTLY Yes Gurjit Addison DO   clopidogrel (PLAVIX) 75 MG tablet TAKE ONE TABLET BY MOUTH DAILY Yes Simba Chan,    Docusate Calcium (STOOL SOFTENER PO) Take by mouth Yes Historical Provider, MD        Social History     Tobacco Use    Smoking status: Never Smoker    Smokeless tobacco: Never Used   Substance Use Topics    Alcohol use: No        Vitals:    09/17/21 1444 09/17/21 1500   BP: (!) 140/82 136/84   Pulse: 90    SpO2: 99%    Weight: 220 lb 12.8 oz (100.2 kg)      Estimated body mass index is 34.58 kg/m² as calculated from the following:    Height as of 8/7/20: 5' 7\" (1.702 m). Weight as of this encounter: 220 lb 12.8 oz (100.2 kg). Physical Exam  Vitals and nursing note reviewed. Constitutional:       Appearance: She is well-developed. HENT:      Head: Normocephalic and atraumatic. Right Ear: External ear normal.      Left Ear: External ear normal.      Nose: Nose normal.   Cardiovascular:      Rate and Rhythm: Normal rate and regular rhythm. Heart sounds: Normal heart sounds. No murmur heard. Pulmonary:      Effort: Pulmonary effort is normal.      Breath sounds: Normal breath sounds. No wheezing.    Abdominal:      General: Bowel sounds are

## 2021-09-19 PROBLEM — M24.531: Status: ACTIVE | Noted: 2021-09-19

## 2021-10-08 RX ORDER — CLOPIDOGREL BISULFATE 75 MG/1
TABLET ORAL
Qty: 90 TABLET | Refills: 0 | Status: SHIPPED | OUTPATIENT
Start: 2021-10-08 | End: 2022-01-10

## 2021-10-08 RX ORDER — CLONIDINE HYDROCHLORIDE 0.2 MG/1
TABLET ORAL
Qty: 180 TABLET | Refills: 0 | Status: SHIPPED | OUTPATIENT
Start: 2021-10-08 | End: 2022-01-10

## 2021-10-08 RX ORDER — PANTOPRAZOLE SODIUM 40 MG/1
TABLET, DELAYED RELEASE ORAL
Qty: 90 TABLET | Refills: 0 | Status: SHIPPED | OUTPATIENT
Start: 2021-10-08 | End: 2022-01-10

## 2021-10-08 RX ORDER — ASPIRIN 81 MG/1
TABLET, COATED ORAL
Qty: 30 TABLET | Refills: 0 | Status: SHIPPED | OUTPATIENT
Start: 2021-10-08 | End: 2021-11-10

## 2021-10-08 RX ORDER — POTASSIUM CHLORIDE 750 MG/1
TABLET, FILM COATED, EXTENDED RELEASE ORAL
Qty: 90 TABLET | Refills: 0 | Status: SHIPPED | OUTPATIENT
Start: 2021-10-08 | End: 2022-01-10

## 2021-10-08 RX ORDER — ATORVASTATIN CALCIUM 80 MG/1
TABLET, FILM COATED ORAL
Qty: 90 TABLET | Refills: 0 | Status: SHIPPED | OUTPATIENT
Start: 2021-10-08 | End: 2021-12-06 | Stop reason: SDUPTHER

## 2021-10-08 RX ORDER — BACLOFEN 5 MG/1
TABLET ORAL
Qty: 90 TABLET | Refills: 0 | Status: SHIPPED | OUTPATIENT
Start: 2021-10-08 | End: 2021-11-15

## 2021-11-10 RX ORDER — ASPIRIN 81 MG/1
TABLET, COATED ORAL
Qty: 30 TABLET | Refills: 0 | Status: SHIPPED | OUTPATIENT
Start: 2021-11-10 | End: 2021-12-10

## 2021-11-15 RX ORDER — BACLOFEN 5 MG/1
TABLET ORAL
Qty: 90 TABLET | Refills: 0 | Status: SHIPPED | OUTPATIENT
Start: 2021-11-15 | End: 2021-12-06

## 2021-12-06 ENCOUNTER — VIRTUAL VISIT (OUTPATIENT)
Dept: FAMILY MEDICINE CLINIC | Age: 51
End: 2021-12-06
Payer: COMMERCIAL

## 2021-12-06 DIAGNOSIS — I63.9 ACUTE ISCHEMIC STROKE (HCC): Primary | ICD-10-CM

## 2021-12-06 DIAGNOSIS — M24.531: ICD-10-CM

## 2021-12-06 DIAGNOSIS — J30.9 ALLERGIC RHINITIS, UNSPECIFIED SEASONALITY, UNSPECIFIED TRIGGER: ICD-10-CM

## 2021-12-06 DIAGNOSIS — I10 HYPERTENSION, UNSPECIFIED TYPE: ICD-10-CM

## 2021-12-06 DIAGNOSIS — K59.00 CONSTIPATION, UNSPECIFIED CONSTIPATION TYPE: ICD-10-CM

## 2021-12-06 PROCEDURE — 99214 OFFICE O/P EST MOD 30 MIN: CPT | Performed by: FAMILY MEDICINE

## 2021-12-06 PROCEDURE — 1036F TOBACCO NON-USER: CPT | Performed by: FAMILY MEDICINE

## 2021-12-06 PROCEDURE — G8484 FLU IMMUNIZE NO ADMIN: HCPCS | Performed by: FAMILY MEDICINE

## 2021-12-06 PROCEDURE — 3017F COLORECTAL CA SCREEN DOC REV: CPT | Performed by: FAMILY MEDICINE

## 2021-12-06 PROCEDURE — G8417 CALC BMI ABV UP PARAM F/U: HCPCS | Performed by: FAMILY MEDICINE

## 2021-12-06 PROCEDURE — G8427 DOCREV CUR MEDS BY ELIG CLIN: HCPCS | Performed by: FAMILY MEDICINE

## 2021-12-06 RX ORDER — BACLOFEN 10 MG/1
10 TABLET ORAL 2 TIMES DAILY
Qty: 180 TABLET | Refills: 0 | Status: SHIPPED | OUTPATIENT
Start: 2021-12-06 | End: 2022-03-11

## 2021-12-06 RX ORDER — LORATADINE 10 MG/1
10 TABLET ORAL DAILY
Qty: 90 TABLET | Refills: 1 | Status: SHIPPED | OUTPATIENT
Start: 2021-12-06 | End: 2022-06-08

## 2021-12-06 RX ORDER — DOCUSATE SODIUM 100 MG/1
100 CAPSULE, LIQUID FILLED ORAL 2 TIMES DAILY
Qty: 60 CAPSULE | Refills: 2 | Status: SHIPPED | OUTPATIENT
Start: 2021-12-06 | End: 2022-03-11

## 2021-12-06 RX ORDER — ATORVASTATIN CALCIUM 80 MG/1
TABLET, FILM COATED ORAL
Qty: 90 TABLET | Refills: 0 | Status: SHIPPED | OUTPATIENT
Start: 2021-12-06 | End: 2022-04-12

## 2021-12-06 ASSESSMENT — ENCOUNTER SYMPTOMS
SHORTNESS OF BREATH: 0
ABDOMINAL PAIN: 0
SORE THROAT: 0
SINUS PRESSURE: 0
VOMITING: 0
CONSTIPATION: 1
NAUSEA: 0
DIARRHEA: 0
COUGH: 0
RHINORRHEA: 1

## 2021-12-06 NOTE — PROGRESS NOTES
2021    TELEHEALTH EVALUATION -- Audio/Visual (During KPMPV-93 public health emergency)    HPI:    Greg Zhou (:  1970) has requested an audio/video evaluation for the following concern(s):    1.  CVA- patient is needing referral to new PT due to insurance not covering rehab at this itme. , patient continues with outpatient PT/OT and believes she has improved, no longer is restricted to wheelchair or a cane, patient is still limited on her left side particularly left hand and arm, arm is in sling today for protection, communication has improved.  She follows with Dr. José Miguel Harkins, Physical Medicine and Rozina Miranda continues off from work and understands her limitations at this time. Britney Joyce does have a backup plan if she is unable to return which in my opinion is likely.      2.  HTN-  controlled at this time, much improved, taking medication as prescribed, no side effects reported from the medication. today, denied headache, vision change, or dizziness.       3.  Allergic rhinitis- she is complaining of rhinorrhea and would like an antihistamine due to the continued nasal drip. Needs refill of medication     4. Needs screening- colonoscopy     5. Constipation - wants stool softner     Today, she denied chest pain, sob, n, v, or diarrhea.     Review of Systems   Constitutional: Negative for activity change, fatigue, fever and unexpected weight change. HENT: Positive for rhinorrhea. Negative for congestion, ear pain, mouth sores, sinus pressure and sore throat. Respiratory: Negative for cough and shortness of breath. Cardiovascular: Negative for chest pain and leg swelling. Gastrointestinal: Positive for constipation. Negative for abdominal pain, diarrhea, nausea and vomiting. Endocrine: Negative for cold intolerance, heat intolerance, polydipsia and polyphagia. Musculoskeletal: Positive for arthralgias. Skin: Negative for rash.    Neurological: Negative for dizziness, tremors, syncope, numbness and headaches. Psychiatric/Behavioral: Negative for dysphoric mood. The patient is not nervous/anxious. Prior to Visit Medications    Medication Sig Taking?  Authorizing Provider   atorvastatin (LIPITOR) 80 MG tablet TAKE ONE TABLET BY MOUTH ONCE NIGHTLY Yes Gurjit Addison DO   baclofen (LIORESAL) 10 MG tablet Take 1 tablet by mouth 2 times daily Yes Gurjit Addison DO   loratadine (CLARITIN) 10 MG tablet Take 1 tablet by mouth daily for 30 doses Yes Gurjit Addison DO   docusate sodium (COLACE) 100 MG capsule Take 1 capsule by mouth 2 times daily Yes Gurjit Addison, DO   ASPIRIN LOW DOSE 81 MG EC tablet TAKE ONE TABLET BY MOUTH DAILY Yes Gurjit Addison, DO   potassium chloride (KLOR-CON) 10 MEQ extended release tablet TAKE ONE TABLET BY MOUTH DAILY WITH BREAKFAST Yes Gurjit Addison, DO   clopidogrel (PLAVIX) 75 MG tablet TAKE ONE TABLET BY MOUTH DAILY Yes Gurjit Addison DO   cloNIDine (CATAPRES) 0.2 MG tablet TAKE ONE TABLET BY MOUTH TWICE A DAY Yes Gurjit Addison DO   pantoprazole (PROTONIX) 40 MG tablet TAKE ONE TABLET BY MOUTH EVERY MORNING BEFORE BREAKFAST Yes Gurjit Addison, DO   losartan-hydroCHLOROthiazide (HYZAAR) 50-12.5 MG per tablet Take 1 tablet by mouth daily Yes Emelia Morales,    Docusate Calcium (STOOL SOFTENER PO) Take by mouth Yes Historical Provider, MD       Social History     Tobacco Use    Smoking status: Never Smoker    Smokeless tobacco: Never Used   Vaping Use    Vaping Use: Never used   Substance Use Topics    Alcohol use: No    Drug use: No        Allergies   Allergen Reactions    Shellfish-Derived Products Hives    Cephalexin     Codeine     Erythromycin     Pcn [Penicillins]        PHYSICAL EXAMINATION:  [ INSTRUCTIONS:  \"[x]\" Indicates a positive item  \"[]\" Indicates a negative item  -- DELETE ALL ITEMS NOT EXAMINED]  Vital Signs: (As obtained by patient/caregiver or practitioner observation)  See HPI  Blood pressure-  Heart rate-    Respiratory rate- Temperature-  Pulse oximetry-     Constitutional: [x] Appears well-developed and well-nourished [] No apparent distress      [] Abnormal-   Mental status  [x] Alert and awake  [] Oriented to person/place/time []Able to follow commands      Eyes:  EOM    []  Normal  [] Abnormal-  Sclera  [x]  Normal  [] Abnormal -         Discharge []  None visible  [] Abnormal -    HENT:   [x] Normocephalic, atraumatic. [] Abnormal   [] Mouth/Throat: Mucous membranes are moist.     External Ears [x] Normal  [] Abnormal-     Neck: [x] No visualized mass     Pulmonary/Chest: [x] Respiratory effort normal.  [] No visualized signs of difficulty breathing or respiratory distress        [] Abnormal-      Musculoskeletal:   [] Normal gait with no signs of ataxia         [x] Normal range of motion of neck        [] Abnormal-       Neurological:        [x] No Facial Asymmetry (Cranial nerve 7 motor function) (limited exam to video visit)          [] No gaze palsy        [] Abnormal-         Skin:        [x] No significant exanthematous lesions or discoloration noted on facial skin         [] Abnormal-            Psychiatric:       [x] Normal Affect [] No Hallucinations        [] Abnormal-     Other pertinent observable physical exam findings-     ASSESSMENT/PLAN:  1. Acute ischemic stroke ([facility] [address]  Stable  Continue with medication  Keep appointments with specialist.   Pb Rojas questions    2. Hypertension, unspecified type   controlled. Discussed signs and symptoms for immediate evaluation in the ER. Reduce sodium. Monitor diet, exercise and lose weight. Keep a BP log and bring it to your next appointment. 3. Contracture of forearm joint, right  Patient will use otc medication  he was educated on the medication and its use. he will  Use ice, heat, and stretching. he will push fluids and rest.   RTC if not improved. 4. Constipation, unspecified constipation type  Take medication as prescribed.    Push fluids and rest  Discussed conservative treatment  Discussed signs and symptoms for immediate evaluation in the ER  RTC if no improved. Tylenol/Ibuprofen for pain    5. Allergic rhinitis, unspecified seasonality, unspecified trigger  Medication provided. Discussed medication use and side effects. Discussed allergy avoidance and possible etiologist including pets and carpet. Discussed the use of proper handwashing and showers. If not improved can discuss allergy referral at next appointment. Return in about 3 months (around 3/6/2022). Lito Regalado, was evaluated through a synchronous (real-time) audio-video encounter. The patient (or guardian if applicable) is aware that this is a billable service. Verbal consent to proceed has been obtained within the past 12 months. The visit was conducted pursuant to the emergency declaration under the [address] [place] [address], [address] [place] [address] authority and the [employer] and [employer] General Act. Patient identification was verified, and a caregiver was present when appropriate. The patient was located in a state where the provider was credentialed to provide care. Total time spent on this encounter: Not billed by time    --Ana Pierce DO on 12/6/2021 at 7:10 PM    An electronic signature was used to authenticate this note.

## 2021-12-10 RX ORDER — ASPIRIN 81 MG/1
TABLET, COATED ORAL
Qty: 30 TABLET | Refills: 0 | Status: SHIPPED | OUTPATIENT
Start: 2021-12-10 | End: 2022-01-10

## 2022-01-10 RX ORDER — ASPIRIN 81 MG/1
TABLET, COATED ORAL
Qty: 30 TABLET | Refills: 0 | Status: SHIPPED | OUTPATIENT
Start: 2022-01-10 | End: 2022-02-10

## 2022-01-10 RX ORDER — POTASSIUM CHLORIDE 750 MG/1
TABLET, FILM COATED, EXTENDED RELEASE ORAL
Qty: 90 TABLET | Refills: 0 | Status: SHIPPED | OUTPATIENT
Start: 2022-01-10 | End: 2022-06-08

## 2022-01-10 RX ORDER — CLONIDINE HYDROCHLORIDE 0.2 MG/1
TABLET ORAL
Qty: 180 TABLET | Refills: 0 | Status: SHIPPED | OUTPATIENT
Start: 2022-01-10 | End: 2022-04-14

## 2022-01-10 RX ORDER — PANTOPRAZOLE SODIUM 40 MG/1
TABLET, DELAYED RELEASE ORAL
Qty: 90 TABLET | Refills: 0 | Status: SHIPPED | OUTPATIENT
Start: 2022-01-10 | End: 2022-02-10

## 2022-01-10 RX ORDER — CLOPIDOGREL BISULFATE 75 MG/1
TABLET ORAL
Qty: 90 TABLET | Refills: 0 | Status: SHIPPED | OUTPATIENT
Start: 2022-01-10 | End: 2022-04-12

## 2022-02-10 RX ORDER — ASPIRIN 81 MG/1
TABLET, COATED ORAL
Qty: 30 TABLET | Refills: 0 | Status: SHIPPED | OUTPATIENT
Start: 2022-02-10 | End: 2022-03-11

## 2022-02-10 RX ORDER — PANTOPRAZOLE SODIUM 40 MG/1
TABLET, DELAYED RELEASE ORAL
Qty: 90 TABLET | Refills: 0 | Status: SHIPPED | OUTPATIENT
Start: 2022-02-10 | End: 2022-06-08

## 2022-03-07 ENCOUNTER — TELEMEDICINE (OUTPATIENT)
Dept: FAMILY MEDICINE CLINIC | Age: 52
End: 2022-03-07
Payer: COMMERCIAL

## 2022-03-07 DIAGNOSIS — I63.9 ACUTE ISCHEMIC STROKE (HCC): Primary | ICD-10-CM

## 2022-03-07 DIAGNOSIS — I10 HYPERTENSION, UNSPECIFIED TYPE: ICD-10-CM

## 2022-03-07 DIAGNOSIS — I63.9 RIGHT SIDED CEREBRAL HEMISPHERE CEREBROVASCULAR ACCIDENT (CVA) (HCC): ICD-10-CM

## 2022-03-07 DIAGNOSIS — Z12.11 COLON CANCER SCREENING: ICD-10-CM

## 2022-03-07 PROCEDURE — G8417 CALC BMI ABV UP PARAM F/U: HCPCS | Performed by: FAMILY MEDICINE

## 2022-03-07 PROCEDURE — 99214 OFFICE O/P EST MOD 30 MIN: CPT | Performed by: FAMILY MEDICINE

## 2022-03-07 PROCEDURE — G8427 DOCREV CUR MEDS BY ELIG CLIN: HCPCS | Performed by: FAMILY MEDICINE

## 2022-03-07 PROCEDURE — 1036F TOBACCO NON-USER: CPT | Performed by: FAMILY MEDICINE

## 2022-03-07 PROCEDURE — G8484 FLU IMMUNIZE NO ADMIN: HCPCS | Performed by: FAMILY MEDICINE

## 2022-03-07 PROCEDURE — 3017F COLORECTAL CA SCREEN DOC REV: CPT | Performed by: FAMILY MEDICINE

## 2022-03-07 RX ORDER — AMLODIPINE BESYLATE 10 MG/1
10 TABLET ORAL DAILY
COMMUNITY

## 2022-03-07 NOTE — PROGRESS NOTES
3/7/2022    TELEHEALTH EVALUATION -- Audio/Visual (During OMUPI-47 public health emergency)    HPI:    Ulysses Carson (:  1970) has requested an audio/video evaluation for the following concern(s):    1.  CVA- patient is needing referral to new PT due to insurance not covering rehab at this itme. , patient continues with outpatient PT/OT and believes she has improved, no longer is restricted to wheelchair or a cane, patient is still limited on her left side particularly left hand and arm, arm is in sling today for protection, communication has improved.  She follows with Dr. Adrienne Arriola, Physical Medicine and Elmer Sainz continues off from work and understands her limitations at this time. Miles Andrade does have a backup plan if she is unable to return which in my opinion is likely.      2.  HTN-  controlled at this time, much improved, taking medication as prescribed, no side effects reported from the medication. today, denied headache, vision change, or dizziness.       3.  Allergic rhinitis- she is complaining of rhinorrhea and would like an antihistamine due to the continued nasal drip.  The Claritin is working.      4.  Needs screening- colonoscopy      5. Constipation - using a stool softner     Today, she denied chest pain, sob, n, v, or diarrhea.       Review of Systems    Prior to Visit Medications    Medication Sig Taking?  Authorizing Provider   ASPIRIN LOW DOSE 81 MG EC tablet TAKE ONE TABLET BY MOUTH DAILY Yes Gurjit Addison, DO   pantoprazole (PROTONIX) 40 MG tablet TAKE ONE TABLET BY MOUTH EVERY MORNING BEFORE BREAKFAST Yes Gurjit Addison DO   clopidogrel (PLAVIX) 75 MG tablet TAKE ONE TABLET BY MOUTH DAILY Yes Gurjit Addison DO   cloNIDine (CATAPRES) 0.2 MG tablet TAKE ONE TABLET BY MOUTH TWICE A DAY Yes Gurjit Addison, DO   potassium chloride (KLOR-CON) 10 MEQ extended release tablet TAKE ONE TABLET BY MOUTH DAILY WITH BREAKFAST Yes Gurjit Addison, DO   atorvastatin (LIPITOR) 80 MG tablet TAKE ONE TABLET BY MOUTH ONCE NIGHTLY Yes Gurjit Addison, DO   baclofen (LIORESAL) 10 MG tablet Take 1 tablet by mouth 2 times daily Yes Gurjit Addison DO   losartan-hydroCHLOROthiazide (HYZAAR) 50-12.5 MG per tablet Take 1 tablet by mouth daily Yes Allie Noguera, DO   Docusate Calcium (STOOL SOFTENER PO) Take by mouth Yes Historical Provider, MD   amLODIPine (NORVASC) 10 MG tablet Take 10 mg by mouth daily  Historical Provider, MD       Social History     Tobacco Use    Smoking status: Never Smoker    Smokeless tobacco: Never Used   Vaping Use    Vaping Use: Never used   Substance Use Topics    Alcohol use: No    Drug use: No        Allergies   Allergen Reactions    Other Hives    Shellfish-Derived Products Hives    Cephalexin     Codeine     Erythromycin     Pcn [Penicillins]        PHYSICAL EXAMINATION:  [ INSTRUCTIONS:  \"[x]\" Indicates a positive item  \"[]\" Indicates a negative item  -- DELETE ALL ITEMS NOT EXAMINED]  Vital Signs: (As obtained by patient/caregiver or practitioner observation)    Blood pressure-  Heart rate-    Respiratory rate-    Temperature-  Pulse oximetry-     Constitutional: [x] Appears well-developed and well-nourished [] No apparent distress      [] Abnormal-   Mental status  [x] Alert and awake  [] Oriented to person/place/time []Able to follow commands      Eyes:  EOM    []  Normal  [] Abnormal-  Sclera  [x]  Normal  [] Abnormal -         Discharge []  None visible  [] Abnormal -    HENT:   [x] Normocephalic, atraumatic.   [] Abnormal   [] Mouth/Throat: Mucous membranes are moist.     External Ears [x] Normal  [] Abnormal-     Neck: [x] No visualized mass     Pulmonary/Chest: [x] Respiratory effort normal.  [] No visualized signs of difficulty breathing or respiratory distress        [] Abnormal-      Musculoskeletal:   [] Normal gait with no signs of ataxia         [x] Normal range of motion of neck        [] Abnormal-       Neurological:        [] No Facial Asymmetry (Cranial nerve 7 motor function) (limited exam to video visit)          [x] No gaze palsy        [] Abnormal-         Skin:        [x] No significant exanthematous lesions or discoloration noted on facial skin         [] Abnormal-            Psychiatric:       [x] Normal Affect [] No Hallucinations        [] Abnormal-     Other pertinent observable physical exam findings-     ASSESSMENT/PLAN:  1. Acute ischemic stroke ([facility] [address]  Stable  Continue with medication  Keep appointments with specialist.   Maycol Humphries questions    2. Right sided cerebral hemisphere cerebrovascular accident (CVA) ([facility] [address]  Stable  Continue with medication  Keep appointments with specialist.   Maycol Humphries questions    3. Hypertension, unspecified type   controlled. Discussed signs and symptoms for immediate evaluation in the ER. Reduce sodium. Monitor diet, exercise and lose weight. Keep a BP log and bring it to your next appointment. 4. Colon cancer screening  Referred for procedure  Educated on the importance of having this done. Answered questions  - Fecal DNA Colorectal cancer screening (Cologuard)      No follow-ups on file. Waldemar Libman, was evaluated through a synchronous (real-time) audio-video encounter. The patient (or guardian if applicable) is aware that this is a billable service, which includes applicable co-pays. This Virtual Visit was conducted with patient's (and/or legal guardian's) consent. The visit was conducted pursuant to the emergency declaration under the [address] [place] [address], [address] [place] [address] authority and the [employer] and [employer] General Act. Patient identification was verified, and a caregiver was present when appropriate. The patient was located at home in a state where the provider was licensed to provide care.       Total time spent on this encounter: Not billed by time    --Severa Juniper, DO on 3/7/2022 at 2:35 PM    An electronic signature was used to authenticate this note.

## 2022-03-10 ENCOUNTER — TELEPHONE (OUTPATIENT)
Dept: FAMILY MEDICINE CLINIC | Age: 52
End: 2022-03-10

## 2022-03-10 DIAGNOSIS — I63.9 ACUTE ISCHEMIC STROKE (HCC): Primary | ICD-10-CM

## 2022-03-10 DIAGNOSIS — I63.9 RIGHT SIDED CEREBRAL HEMISPHERE CEREBROVASCULAR ACCIDENT (CVA) (HCC): ICD-10-CM

## 2022-03-10 DIAGNOSIS — I10 HYPERTENSION, UNSPECIFIED TYPE: ICD-10-CM

## 2022-03-10 NOTE — TELEPHONE ENCOUNTER
----- Message from Sergio Pac sent at 3/10/2022  8:18 AM EST -----  Subject: Message to Provider    QUESTIONS  Information for Provider? needs orders for fasting labs for in june   ---------------------------------------------------------------------------  --------------  [address]  What is the best way for the office to contact you? OK to leave message on   voicemail  Preferred Call Back Phone Number? [phone]  ---------------------------------------------------------------------------  --------------  SCRIPT ANSWERS  Relationship to Patient?  Self

## 2022-03-11 RX ORDER — DOCUSATE SODIUM 100 MG/1
CAPSULE, LIQUID FILLED ORAL
Qty: 60 CAPSULE | Refills: 2 | Status: SHIPPED | OUTPATIENT
Start: 2022-03-11 | End: 2022-06-08

## 2022-03-11 RX ORDER — ASPIRIN 81 MG/1
TABLET, COATED ORAL
Qty: 30 TABLET | Refills: 0 | Status: SHIPPED | OUTPATIENT
Start: 2022-03-11 | End: 2022-04-12

## 2022-03-11 RX ORDER — BACLOFEN 10 MG/1
TABLET ORAL
Qty: 180 TABLET | Refills: 0 | Status: SHIPPED | OUTPATIENT
Start: 2022-03-11 | End: 2022-06-08

## 2022-04-12 RX ORDER — LOSARTAN POTASSIUM AND HYDROCHLOROTHIAZIDE 12.5; 5 MG/1; MG/1
1 TABLET ORAL DAILY
Qty: 30 TABLET | Refills: 3 | Status: SHIPPED | OUTPATIENT
Start: 2022-04-12 | End: 2022-08-08

## 2022-04-12 RX ORDER — ASPIRIN 81 MG/1
TABLET, COATED ORAL
Qty: 30 TABLET | Refills: 0 | Status: SHIPPED | OUTPATIENT
Start: 2022-04-12 | End: 2022-05-09

## 2022-04-12 RX ORDER — CLOPIDOGREL BISULFATE 75 MG/1
TABLET ORAL
Qty: 90 TABLET | Refills: 1 | Status: SHIPPED | OUTPATIENT
Start: 2022-04-12 | End: 2022-08-11

## 2022-04-12 RX ORDER — ATORVASTATIN CALCIUM 80 MG/1
TABLET, FILM COATED ORAL
Qty: 90 TABLET | Refills: 1 | Status: SHIPPED | OUTPATIENT
Start: 2022-04-12 | End: 2022-08-11

## 2022-04-14 RX ORDER — CLONIDINE HYDROCHLORIDE 0.2 MG/1
TABLET ORAL
Qty: 180 TABLET | Refills: 0 | Status: SHIPPED | OUTPATIENT
Start: 2022-04-14 | End: 2022-06-08

## 2022-04-14 NOTE — TELEPHONE ENCOUNTER
Last office visit 3/7/2022     Last written     Next office visit scheduled 6/7/2022    Requested Prescriptions     Pending Prescriptions Disp Refills    cloNIDine (CATAPRES) 0.2 MG tablet [Pharmacy Med Name: cloNIDine HCL 0.2MG TABLET] 180 tablet 0     Sig: TAKE ONE TABLET BY MOUTH TWICE A DAY

## 2022-05-09 RX ORDER — ASPIRIN 81 MG/1
TABLET, COATED ORAL
Qty: 30 TABLET | Refills: 0 | Status: SHIPPED | OUTPATIENT
Start: 2022-05-09 | End: 2022-06-08

## 2022-06-01 NOTE — TELEPHONE ENCOUNTER
Pt need a refill on her Clonidine 0.2 mg and Pantoprazole 40 mg sent to [facility]. She is completely out of theses med's. She already has her 6 month appt scheduled. Cyclosporine Pregnancy And Lactation Text: This medication is Pregnancy Category C and it isn't know if it is safe during pregnancy. This medication is excreted in breast milk.

## 2022-06-07 ENCOUNTER — TELEPHONE (OUTPATIENT)
Dept: OTHER | Facility: CLINIC | Age: 52
End: 2022-06-07

## 2022-06-07 NOTE — TELEPHONE ENCOUNTER
Donovan Button, Was contacted today as part of [facility] to schedule a mammogram.       Left VM for pt to return call to this nurse regarding routine health screenings. MyChart message also sent.        Lyles ISAAC, LPN

## 2022-06-08 ENCOUNTER — OFFICE VISIT (OUTPATIENT)
Dept: FAMILY MEDICINE CLINIC | Age: 52
End: 2022-06-08
Payer: COMMERCIAL

## 2022-06-08 VITALS
SYSTOLIC BLOOD PRESSURE: 140 MMHG | BODY MASS INDEX: 35.27 KG/M2 | WEIGHT: 225.2 LBS | DIASTOLIC BLOOD PRESSURE: 80 MMHG | OXYGEN SATURATION: 98 % | HEART RATE: 92 BPM

## 2022-06-08 DIAGNOSIS — I10 HYPERTENSION, UNSPECIFIED TYPE: ICD-10-CM

## 2022-06-08 DIAGNOSIS — I63.9 RIGHT SIDED CEREBRAL HEMISPHERE CEREBROVASCULAR ACCIDENT (CVA) (HCC): Primary | ICD-10-CM

## 2022-06-08 DIAGNOSIS — I63.9 ACUTE ISCHEMIC STROKE (HCC): ICD-10-CM

## 2022-06-08 LAB
A/G RATIO: 1 (ref 1.1–2.2)
ALBUMIN SERPL-MCNC: 3.9 G/DL (ref 3.4–5)
ALP BLD-CCNC: 111 U/L (ref 40–129)
ALT SERPL-CCNC: 11 U/L (ref 10–40)
ANION GAP SERPL CALCULATED.3IONS-SCNC: 13 MMOL/L (ref 3–16)
AST SERPL-CCNC: 12 U/L (ref 15–37)
BILIRUB SERPL-MCNC: 0.5 MG/DL (ref 0–1)
BUN BLDV-MCNC: 9 MG/DL (ref 7–20)
CALCIUM SERPL-MCNC: 9.2 MG/DL (ref 8.3–10.6)
CHLORIDE BLD-SCNC: 101 MMOL/L (ref 99–110)
CHOLESTEROL, TOTAL: 185 MG/DL (ref 0–199)
CO2: 23 MMOL/L (ref 21–32)
CREAT SERPL-MCNC: 0.7 MG/DL (ref 0.6–1.1)
GFR AFRICAN AMERICAN: >60
GFR NON-AFRICAN AMERICAN: >60
GLUCOSE BLD-MCNC: 108 MG/DL (ref 70–99)
HCT VFR BLD CALC: 39.4 % (ref 36–48)
HDLC SERPL-MCNC: 50 MG/DL (ref 40–60)
HEMOGLOBIN: 13.2 G/DL (ref 12–16)
LDL CHOLESTEROL CALCULATED: 120 MG/DL
MCH RBC QN AUTO: 27.6 PG (ref 26–34)
MCHC RBC AUTO-ENTMCNC: 33.5 G/DL (ref 31–36)
MCV RBC AUTO: 82.3 FL (ref 80–100)
PDW BLD-RTO: 14.9 % (ref 12.4–15.4)
PLATELET # BLD: 243 K/UL (ref 135–450)
PMV BLD AUTO: 8.9 FL (ref 5–10.5)
POTASSIUM SERPL-SCNC: 4.1 MMOL/L (ref 3.5–5.1)
RBC # BLD: 4.79 M/UL (ref 4–5.2)
SODIUM BLD-SCNC: 137 MMOL/L (ref 136–145)
TOTAL PROTEIN: 7.7 G/DL (ref 6.4–8.2)
TRIGL SERPL-MCNC: 73 MG/DL (ref 0–150)
TSH REFLEX FT4: 1.19 UIU/ML (ref 0.27–4.2)
VLDLC SERPL CALC-MCNC: 15 MG/DL
WBC # BLD: 4.5 K/UL (ref 4–11)

## 2022-06-08 PROCEDURE — 99214 OFFICE O/P EST MOD 30 MIN: CPT | Performed by: FAMILY MEDICINE

## 2022-06-08 PROCEDURE — 36415 COLL VENOUS BLD VENIPUNCTURE: CPT | Performed by: FAMILY MEDICINE

## 2022-06-08 PROCEDURE — G8427 DOCREV CUR MEDS BY ELIG CLIN: HCPCS | Performed by: FAMILY MEDICINE

## 2022-06-08 PROCEDURE — G8417 CALC BMI ABV UP PARAM F/U: HCPCS | Performed by: FAMILY MEDICINE

## 2022-06-08 PROCEDURE — 1036F TOBACCO NON-USER: CPT | Performed by: FAMILY MEDICINE

## 2022-06-08 PROCEDURE — 3017F COLORECTAL CA SCREEN DOC REV: CPT | Performed by: FAMILY MEDICINE

## 2022-06-08 RX ORDER — PETROLATUM 42 G/100G
OINTMENT TOPICAL
Qty: 454 G | Refills: 0 | Status: SHIPPED | OUTPATIENT
Start: 2022-06-08 | End: 2022-07-11

## 2022-06-08 RX ORDER — PANTOPRAZOLE SODIUM 40 MG/1
TABLET, DELAYED RELEASE ORAL
Qty: 90 TABLET | Refills: 0 | Status: SHIPPED | OUTPATIENT
Start: 2022-06-08

## 2022-06-08 RX ORDER — LORATADINE 10 MG/1
TABLET ORAL
Qty: 30 TABLET | Refills: 2 | Status: SHIPPED | OUTPATIENT
Start: 2022-06-08 | End: 2022-08-11

## 2022-06-08 RX ORDER — POTASSIUM CHLORIDE 750 MG/1
TABLET, FILM COATED, EXTENDED RELEASE ORAL
Qty: 90 TABLET | Refills: 0 | Status: SHIPPED | OUTPATIENT
Start: 2022-06-08 | End: 2022-07-11

## 2022-06-08 RX ORDER — DOCUSATE SODIUM 100 MG/1
CAPSULE, LIQUID FILLED ORAL
Qty: 60 CAPSULE | Refills: 2 | Status: SHIPPED | OUTPATIENT
Start: 2022-06-08 | End: 2022-08-11

## 2022-06-08 RX ORDER — CLONIDINE HYDROCHLORIDE 0.2 MG/1
TABLET ORAL
Qty: 180 TABLET | Refills: 0 | Status: SHIPPED | OUTPATIENT
Start: 2022-06-08

## 2022-06-08 RX ORDER — BACLOFEN 10 MG/1
TABLET ORAL
Qty: 180 TABLET | Refills: 0 | Status: SHIPPED | OUTPATIENT
Start: 2022-06-08 | End: 2022-08-11

## 2022-06-08 RX ORDER — ASPIRIN 81 MG/1
TABLET, COATED ORAL
Qty: 30 TABLET | Refills: 0 | Status: SHIPPED | OUTPATIENT
Start: 2022-06-08 | End: 2022-07-11

## 2022-06-08 ASSESSMENT — PATIENT HEALTH QUESTIONNAIRE - PHQ9
SUM OF ALL RESPONSES TO PHQ QUESTIONS 1-9: 0
1. LITTLE INTEREST OR PLEASURE IN DOING THINGS: 0
2. FEELING DOWN, DEPRESSED OR HOPELESS: 0
SUM OF ALL RESPONSES TO PHQ QUESTIONS 1-9: 0
SUM OF ALL RESPONSES TO PHQ9 QUESTIONS 1 & 2: 0
SUM OF ALL RESPONSES TO PHQ QUESTIONS 1-9: 0
SUM OF ALL RESPONSES TO PHQ QUESTIONS 1-9: 0

## 2022-06-08 NOTE — PROGRESS NOTES
2022     Yu Chappell (:  1970) is a 46 y.o. female, here for evaluation of the following medical concerns:    HPI       1.  CVA- patient is needing referral to new PT due to insurance not covering rehab at this itme. , patient continues with outpatient PT/OT and believes she has improved, no longer is restricted to wheelchair or a cane, patient is still limited on her left side particularly left hand and arm, arm is in sling today for protection, communication has improved.  She follows with Dr. Sara Heath, Physical Medicine and Minh Dudley continues off from work and understands her limitations at this time. Carla Juarez does have a backup plan if she is unable to return which in my opinion is likely.      2.  HTN-  controlled at this time, much improved, taking medication as prescribed, no side effects reported from the medication. today, denied headache, vision change, or dizziness.       3.  Allergic rhinitis- she is complaining of rhinorrhea and would like an antihistamine due to the continued nasal drip.  The Claritin is working.      4.  Needs screening- colonoscopy      Today, she denied chest pain, sob, n, v, or diarrhea.   Review of Systems   Constitutional: Negative for activity change, fatigue, fever and unexpected weight change. HENT: Negative for congestion, ear pain, facial swelling, rhinorrhea, sinus pressure and sore throat. Respiratory: Negative for cough and shortness of breath. Cardiovascular: Negative for chest pain and leg swelling. Gastrointestinal: Negative for abdominal pain, diarrhea, nausea and vomiting. Endocrine: Negative for cold intolerance, heat intolerance, polydipsia and polyphagia. Musculoskeletal: Positive for arthralgias. Skin: Negative for rash. Neurological: Negative for dizziness, numbness and headaches. Psychiatric/Behavioral: Negative for dysphoric mood. The patient is not nervous/anxious.         Prior to Visit Medications    Medication Sig Taking? Authorizing Provider   mineral oil-hydrophilic petrolatum (HYDROPHOR) ointment Apply topically as needed. Yes Maren Lomeli, DO   atorvastatin (LIPITOR) 80 MG tablet TAKE ONE TABLET BY MOUTH ONCE NIGHTLY Yes Gurjit Addison, DO   clopidogrel (PLAVIX) 75 MG tablet TAKE ONE TABLET BY MOUTH DAILY Yes Gurjit Addison, DO   losartan-hydroCHLOROthiazide (HYZAAR) 50-12.5 MG per tablet TAKE 1 TABLET BY MOUTH DAILY Yes Gurjit Addison, DO   amLODIPine (NORVASC) 10 MG tablet Take 10 mg by mouth daily Yes Historical Provider, MD   Docusate Calcium (STOOL SOFTENER PO) Take by mouth Yes Historical Provider, MD   loratadine (CLARITIN) 10 MG tablet TAKE ONE TABLET BY MOUTH DAILY  Gurjit Addison, DO   docusate sodium (COLACE) 100 mg capsule TAKE ONE CAPSULE BY MOUTH TWICE A DAY  Gurjit Addison, DO   ASPIRIN LOW DOSE 81 MG EC tablet TAKE ONE TABLET BY MOUTH DAILY  Gurjit Addison, DO   cloNIDine (CATAPRES) 0.2 MG tablet TAKE ONE TABLET BY MOUTH TWICE A DAY  Gurjit Addison, DO   pantoprazole (PROTONIX) 40 MG tablet TAKE ONE TABLET BY MOUTH EVERY MORNING BEFORE BREAKFAST  Gurjit Addison, DO   baclofen (LIORESAL) 10 MG tablet TAKE ONE TABLET BY MOUTH TWICE A DAY  Gurjit Addison, DO   potassium chloride (KLOR-CON) 10 MEQ extended release tablet TAKE ONE TABLET BY MOUTH DAILY WITH BREAKFAST  Maren Lomeli, DO        Social History     Tobacco Use    Smoking status: Never Smoker    Smokeless tobacco: Never Used   Substance Use Topics    Alcohol use: No        Vitals:    06/08/22 0915 06/08/22 0955   BP: (!) 158/92 (!) 140/80   Pulse: 92    SpO2: 98%    Weight: 225 lb 3.2 oz (102.2 kg)      Estimated body mass index is 35.27 kg/m² as calculated from the following:    Height as of 8/7/20: 5' 7\" (1.702 m). Weight as of this encounter: 225 lb 3.2 oz (102.2 kg). Physical Exam  Vitals and nursing note reviewed. Constitutional:       Appearance: She is well-developed. HENT:      Head: Normocephalic and atraumatic.       Right Ear: External ear normal.      Left Ear: External ear normal.      Nose: Nose normal.   Cardiovascular:      Rate and Rhythm: Normal rate and regular rhythm. Heart sounds: Normal heart sounds. No murmur heard. Pulmonary:      Effort: Pulmonary effort is normal.      Breath sounds: Normal breath sounds. No wheezing. Abdominal:      General: Bowel sounds are normal.      Palpations: Abdomen is soft. Tenderness: There is no abdominal tenderness. Skin:     General: Skin is warm and dry. Neurological:      Mental Status: She is alert and oriented to person, place, and time. Psychiatric:         Behavior: Behavior normal.         ASSESSMENT/PLAN:  1. Right sided cerebral hemisphere cerebrovascular accident (CVA) ([facility] [address]  Stable  Continue with medication  Keep appointments with specialist.   Answered questions  - CBC  - Comprehensive Metabolic Panel  - Lipid Panel  - TSH with Reflex to FT4    2. [facility]  Stable  Continue with medication  Keep appointments with specialist.   Answered questions  - CBC  - Comprehensive Metabolic Panel  - Lipid Panel  - TSH with Reflex to FT4    3. Hypertension, unspecified type  Stable  Continue with medication  BP at home are wnl according to the family   Answered questions  - CBC  - Comprehensive Metabolic Panel  - Lipid Panel  - TSH with Reflex to FT4      Return in about 6 months (around 12/8/2022).

## 2022-06-08 NOTE — TELEPHONE ENCOUNTER
Last office visit 6/8/2022     Last written     Next office visit scheduled Visit date not found    Requested Prescriptions     Pending Prescriptions Disp Refills    loratadine (CLARITIN) 10 MG tablet [Pharmacy Med Name: LORATADINE 10 MG TABLET] 30 tablet      Sig: TAKE ONE TABLET BY MOUTH DAILY    docusate sodium (COLACE) 100 mg capsule [Pharmacy Med Name: DOCUSATE SODIUM 100 MG SOFTGEL] 60 capsule 2     Sig: TAKE ONE CAPSULE BY MOUTH TWICE A DAY    ASPIRIN LOW DOSE 81 MG EC tablet [Pharmacy Med Name: ASPIRIN EC 81 MG TABLET] 30 tablet 0     Sig: TAKE ONE TABLET BY MOUTH DAILY    cloNIDine (CATAPRES) 0.2 MG tablet [Pharmacy Med Name: cloNIDine HCL 0.2MG TABLET] 180 tablet 0     Sig: TAKE ONE TABLET BY MOUTH TWICE A DAY    pantoprazole (PROTONIX) 40 MG tablet [Pharmacy Med Name: PANTOPRAZOLE SOD DR 40 MG TAB] 90 tablet 0     Sig: TAKE ONE TABLET BY MOUTH EVERY MORNING BEFORE BREAKFAST    baclofen (LIORESAL) 10 MG tablet [Pharmacy Med Name: BACLOFEN 10 MG TABLET] 180 tablet 0     Sig: TAKE ONE TABLET BY MOUTH TWICE A DAY    potassium chloride (KLOR-CON) 10 MEQ extended release tablet [Pharmacy Med Name: POTASSIUM CHLORIDE ER 10MEQ TABLET] 90 tablet 0     Sig: TAKE ONE TABLET BY MOUTH DAILY WITH BREAKFAST

## 2022-06-13 ENCOUNTER — TELEPHONE (OUTPATIENT)
Dept: FAMILY MEDICINE CLINIC | Age: 52
End: 2022-06-13

## 2022-06-13 NOTE — TELEPHONE ENCOUNTER
Patient called stating she spoke to Dr ORESTES TOLENTINO about her increased shoulder pain. Patient states Dr ORESTES TOLENTINO wants to prescribe more \"pills\" but she does not want to take any more pills. Patient inquiring if Dr Nathalie Chin will prescribe her pain patches.  Patient also requesting a return call with her lab results

## 2022-06-14 NOTE — TELEPHONE ENCOUNTER
I'm not sure who Dr. Rancho Borja is and what specialty he is with. I would discuss pain patch with him.

## 2022-06-19 ASSESSMENT — ENCOUNTER SYMPTOMS
NAUSEA: 0
ABDOMINAL PAIN: 0
SINUS PRESSURE: 0
COUGH: 0
RHINORRHEA: 0
SHORTNESS OF BREATH: 0
SORE THROAT: 0
FACIAL SWELLING: 0
DIARRHEA: 0
VOMITING: 0

## 2022-07-11 RX ORDER — POTASSIUM CHLORIDE 750 MG/1
TABLET, FILM COATED, EXTENDED RELEASE ORAL
Qty: 90 TABLET | Refills: 0 | Status: SHIPPED | OUTPATIENT
Start: 2022-07-11

## 2022-07-11 RX ORDER — ASPIRIN 81 MG/1
TABLET, COATED ORAL
Qty: 30 TABLET | Refills: 0 | Status: SHIPPED | OUTPATIENT
Start: 2022-07-11 | End: 2022-08-17 | Stop reason: SDUPTHER

## 2022-07-11 RX ORDER — PETROLATUM 420 MG/G
OINTMENT TOPICAL
Qty: 454 G | Refills: 1 | Status: SHIPPED | OUTPATIENT
Start: 2022-07-11 | End: 2022-08-17 | Stop reason: SDUPTHER

## 2022-08-08 RX ORDER — LOSARTAN POTASSIUM AND HYDROCHLOROTHIAZIDE 12.5; 5 MG/1; MG/1
TABLET ORAL
Qty: 30 TABLET | Refills: 3 | Status: SHIPPED | OUTPATIENT
Start: 2022-08-08

## 2022-08-11 RX ORDER — DOCUSATE SODIUM 100 MG/1
CAPSULE, LIQUID FILLED ORAL
Qty: 60 CAPSULE | Refills: 2 | Status: SHIPPED | OUTPATIENT
Start: 2022-08-11

## 2022-08-11 RX ORDER — LORATADINE 10 MG/1
TABLET ORAL
Qty: 30 TABLET | Refills: 2 | Status: SHIPPED | OUTPATIENT
Start: 2022-08-11

## 2022-08-11 RX ORDER — BACLOFEN 10 MG/1
TABLET ORAL
Qty: 60 TABLET | Refills: 0 | Status: SHIPPED | OUTPATIENT
Start: 2022-08-11 | End: 2022-10-10

## 2022-08-11 RX ORDER — CLOPIDOGREL BISULFATE 75 MG/1
TABLET ORAL
Qty: 90 TABLET | Refills: 1 | Status: SHIPPED | OUTPATIENT
Start: 2022-08-11

## 2022-08-11 RX ORDER — ATORVASTATIN CALCIUM 80 MG/1
TABLET, FILM COATED ORAL
Qty: 90 TABLET | Refills: 1 | Status: SHIPPED | OUTPATIENT
Start: 2022-08-11

## 2022-08-15 RX ORDER — ASPIRIN 81 MG/1
TABLET ORAL
Qty: 30 TABLET | Refills: 0 | Status: CANCELLED | OUTPATIENT
Start: 2022-08-15

## 2022-08-15 NOTE — TELEPHONE ENCOUNTER
Last office visit 6/8/2022     Last written     Next office visit scheduled Visit date not found    Requested Prescriptions     Pending Prescriptions Disp Refills    aspirin (ASPIRIN LOW DOSE) 81 MG EC tablet 30 tablet 0

## 2022-08-17 RX ORDER — ASPIRIN 81 MG/1
TABLET ORAL
Qty: 30 TABLET | Refills: 0 | Status: SHIPPED | OUTPATIENT
Start: 2022-08-17 | End: 2022-09-12

## 2022-08-17 RX ORDER — PETROLATUM 420 MG/G
1 OINTMENT TOPICAL DAILY
Qty: 454 G | Refills: 1 | Status: SHIPPED | OUTPATIENT
Start: 2022-08-17

## 2022-08-17 NOTE — TELEPHONE ENCOUNTER
Patient states pharmacy is informing her that the medications cannot be filled because Dr Janell Sandhu is not responding to request. Patient also states the pharmacy is stating the Petrolatum was not sent in July with refills        LOV 6/8/22  No upcoming appt scheduled

## 2022-09-12 RX ORDER — ASPIRIN 81 MG/1
TABLET ORAL
Qty: 30 TABLET | Refills: 0 | Status: SHIPPED | OUTPATIENT
Start: 2022-09-12 | End: 2022-10-12

## 2022-09-12 NOTE — TELEPHONE ENCOUNTER
Last office visit 6/8/2022     Last written      Next office visit scheduled Visit date not found    Requested Prescriptions     Pending Prescriptions Disp Refills    aspirin (ASPIRIN LOW DOSE) 81 MG EC tablet [Pharmacy Med Name: ASPIRIN EC 81 MG TABLET] 30 tablet 0     Sig: TAKE ONE TABLET BY MOUTH DAILY

## 2022-10-10 RX ORDER — BACLOFEN 10 MG/1
TABLET ORAL
Qty: 60 TABLET | Refills: 0 | Status: SHIPPED | OUTPATIENT
Start: 2022-10-10

## 2022-10-10 NOTE — TELEPHONE ENCOUNTER
Last office visit 6/8/2022     Last written      Next office visit scheduled Visit date not found    Requested Prescriptions     Pending Prescriptions Disp Refills    baclofen (LIORESAL) 10 MG tablet [Pharmacy Med Name: BACLOFEN 10 MG TABLET] 60 tablet 0     Sig: TAKE ONE TABLET BY MOUTH TWICE A DAY

## 2022-10-12 RX ORDER — ASPIRIN 81 MG/1
TABLET ORAL
Qty: 30 TABLET | Refills: 0 | Status: SHIPPED | OUTPATIENT
Start: 2022-10-12

## 2022-11-09 RX ORDER — ASPIRIN 81 MG/1
TABLET ORAL
Qty: 30 TABLET | Refills: 0 | Status: SHIPPED | OUTPATIENT
Start: 2022-11-09

## 2022-11-09 RX ORDER — BACLOFEN 10 MG/1
TABLET ORAL
Qty: 60 TABLET | Refills: 0 | Status: SHIPPED | OUTPATIENT
Start: 2022-11-09

## 2022-11-09 NOTE — TELEPHONE ENCOUNTER
Last office visit 6/8/2022     Last written      Next office visit scheduled Visit date not found    Requested Prescriptions     Pending Prescriptions Disp Refills    baclofen (LIORESAL) 10 MG tablet [Pharmacy Med Name: BACLOFEN 10 MG TABLET] 60 tablet 0     Sig: TAKE ONE TABLET BY MOUTH TWICE A DAY    aspirin (ASPIRIN LOW DOSE) 81 MG EC tablet [Pharmacy Med Name: ASPIRIN EC 81 MG TABLET] 30 tablet 0     Sig: TAKE ONE TABLET BY MOUTH DAILY

## 2022-11-29 ENCOUNTER — COMMUNITY OUTREACH (OUTPATIENT)
Dept: FAMILY MEDICINE CLINIC | Age: 52
End: 2022-11-29

## 2022-11-29 NOTE — PROGRESS NOTES
Patient's HM shows they are overdue for Mammogram, Colorectal Screening and Cervical Cancer Screening. [facility] and  files searched. No results to attach to order nor HM updated.

## 2022-12-07 RX ORDER — ASPIRIN 81 MG/1
TABLET ORAL
Qty: 30 TABLET | Refills: 0 | Status: SHIPPED | OUTPATIENT
Start: 2022-12-07

## 2022-12-07 RX ORDER — BACLOFEN 10 MG/1
TABLET ORAL
Qty: 60 TABLET | Refills: 0 | Status: SHIPPED | OUTPATIENT
Start: 2022-12-07

## 2022-12-09 RX ORDER — LOSARTAN POTASSIUM AND HYDROCHLOROTHIAZIDE 12.5; 5 MG/1; MG/1
TABLET ORAL
Qty: 30 TABLET | Refills: 3 | Status: SHIPPED | OUTPATIENT
Start: 2022-12-09

## 2023-01-18 RX ORDER — ASPIRIN 81 MG/1
TABLET ORAL
Qty: 30 TABLET | Refills: 0 | Status: SHIPPED | OUTPATIENT
Start: 2023-01-18

## 2023-01-18 RX ORDER — BACLOFEN 10 MG/1
TABLET ORAL
Qty: 60 TABLET | Refills: 0 | Status: SHIPPED | OUTPATIENT
Start: 2023-01-18

## 2023-01-27 ENCOUNTER — TELEPHONE (OUTPATIENT)
Dept: FAMILY MEDICINE CLINIC | Age: 53
End: 2023-01-27

## 2023-01-27 NOTE — LETTER
[address] [facility],First Floor [address]  Dept: [phone]  Dept Fax: [phone]  Loc: [address] [place] [address], DO   1/27/2023      Clover Sexton  [address] [address]      Dear Rachana Mendoza    Our records show that you are due or overdue for a colon cancer screening. Colorectal cancer is the second leading cause of cancer-related death in the [place]. The good news is that this disease can be prevented. Colon Cancer screening can detect precancerous polyps that can be removed with easy procedures. The U.S Preventative Services Task Force tells us that appropriately scheduled colon cancer screening reduces death from colorectal cancer, and strongly suggests screening for men and women ages 48 and older. I recommend that you elect one of the following two approved options:         1) A test that finds polyps and cancer - colonoscopy       2) A test that primarily finds cancer -[place]-guard performed at home     The colonoscopy is preferable as it is able to identify and treat both pre-cancerous polyps and early forms of colon cancer. Please call our office so we can assist you in scheduling your colonoscopy. We look forward to hearing from you. If you already have had this done, we praise your attention to your health. Please call our office so we can update your records. Please make sure to let us know who performed your test and where it was done. We also welcome you to reach out to us online using [employer]. Ask us how!     Sincerely,    Anita Phillips, DO    www.cdc.gov

## 2023-01-27 NOTE — LETTER
[address] [address]  Dept: [phone]  Loc: [address] [place] [address],   1/27/2023      MEHDI[address] [facility] [address]          Dear Dayron Valle records show that you are in need of a screening mammogram. The American Cancer Society's guidelines state that early detection of breast cancer improves the chances that breast cancer can be diagnosed at an early stage and treated successfully. Women age 36 and older should have a mammogram every year and should continue to do so for as long as they are in good health. To schedule a screening mammogram, you do not need an order from your doctor. You can call [address] [place] [address] [phone]) to schedule a mammogram at your earliest convenience. If you already had a mammogram in the last 12 months, congratulations for taking this step toward good health! Please call our office to inform us of the date and location of your (most recent) mammogram. We also welcome you to reach out to us online using [employer]. Ask us how!     Sincerely,    Ruchi Crowley, DO

## 2023-01-31 ENCOUNTER — OFFICE VISIT (OUTPATIENT)
Dept: FAMILY MEDICINE CLINIC | Age: 53
End: 2023-01-31
Payer: COMMERCIAL

## 2023-01-31 VITALS
DIASTOLIC BLOOD PRESSURE: 78 MMHG | SYSTOLIC BLOOD PRESSURE: 128 MMHG | BODY MASS INDEX: 35.16 KG/M2 | WEIGHT: 224 LBS | HEIGHT: 67 IN

## 2023-01-31 DIAGNOSIS — Z12.4 CERVICAL CANCER SCREENING: ICD-10-CM

## 2023-01-31 DIAGNOSIS — I63.9 ACUTE ISCHEMIC STROKE (HCC): Primary | ICD-10-CM

## 2023-01-31 DIAGNOSIS — I10 HYPERTENSION, UNSPECIFIED TYPE: ICD-10-CM

## 2023-01-31 DIAGNOSIS — Z12.31 ENCOUNTER FOR SCREENING MAMMOGRAM FOR MALIGNANT NEOPLASM OF BREAST: ICD-10-CM

## 2023-01-31 PROCEDURE — G8484 FLU IMMUNIZE NO ADMIN: HCPCS | Performed by: FAMILY MEDICINE

## 2023-01-31 PROCEDURE — 1036F TOBACCO NON-USER: CPT | Performed by: FAMILY MEDICINE

## 2023-01-31 PROCEDURE — G8427 DOCREV CUR MEDS BY ELIG CLIN: HCPCS | Performed by: FAMILY MEDICINE

## 2023-01-31 PROCEDURE — 3017F COLORECTAL CA SCREEN DOC REV: CPT | Performed by: FAMILY MEDICINE

## 2023-01-31 PROCEDURE — 3074F SYST BP LT 130 MM HG: CPT | Performed by: FAMILY MEDICINE

## 2023-01-31 PROCEDURE — 3078F DIAST BP <80 MM HG: CPT | Performed by: FAMILY MEDICINE

## 2023-01-31 PROCEDURE — G8417 CALC BMI ABV UP PARAM F/U: HCPCS | Performed by: FAMILY MEDICINE

## 2023-01-31 PROCEDURE — 99214 OFFICE O/P EST MOD 30 MIN: CPT | Performed by: FAMILY MEDICINE

## 2023-01-31 RX ORDER — BACLOFEN 10 MG/1
5 TABLET ORAL 3 TIMES DAILY PRN
Qty: 90 TABLET | Refills: 2 | Status: SHIPPED | OUTPATIENT
Start: 2023-01-31

## 2023-01-31 RX ORDER — BACLOFEN 10 MG/1
5 TABLET ORAL 3 TIMES DAILY PRN
COMMUNITY
End: 2023-01-31 | Stop reason: SDUPTHER

## 2023-01-31 RX ORDER — PETROLATUM 420 MG/G
1 OINTMENT TOPICAL DAILY
Qty: 454 G | Refills: 1 | Status: SHIPPED | OUTPATIENT
Start: 2023-01-31

## 2023-01-31 RX ORDER — POLYETHYLENE GLYCOL 3350 17 G/17G
17 POWDER, FOR SOLUTION ORAL DAILY PRN
Qty: 510 G | Refills: 5 | Status: SHIPPED | OUTPATIENT
Start: 2023-01-31 | End: 2023-03-02

## 2023-01-31 ASSESSMENT — PATIENT HEALTH QUESTIONNAIRE - PHQ9
SUM OF ALL RESPONSES TO PHQ QUESTIONS 1-9: 0
2. FEELING DOWN, DEPRESSED OR HOPELESS: 0
SUM OF ALL RESPONSES TO PHQ QUESTIONS 1-9: 0
SUM OF ALL RESPONSES TO PHQ9 QUESTIONS 1 & 2: 0
SUM OF ALL RESPONSES TO PHQ QUESTIONS 1-9: 0
1. LITTLE INTEREST OR PLEASURE IN DOING THINGS: 0
SUM OF ALL RESPONSES TO PHQ QUESTIONS 1-9: 0

## 2023-01-31 NOTE — Clinical Note
[address] [facility]  [facility] [address] [facility],First Floor [address]  Phone: [phone]  Fax: Julio Nielsen DO        February 4, 2023     Patient: Cindy Marlow   YOB: 1970   Date of Visit: 1/31/2023       To Whom It May Concern: It is my medical opinion that Cindy Marlow {Work release (duty restriction):49279}. If you have any questions or concerns, please don't hesitate to call.     Sincerely,        Beronica Burrows DO

## 2023-01-31 NOTE — LETTER
[address] [facility]  [facility] [address] [address]  Phone: [phone]  Fax: [phone]    Aga Miles DO         January 31, 2023     Patient: Martina Puentes   YOB: 1970   Date of Visit: 1/31/2023       To Whom It May Concern: It is my medical opinion that Martina Puentes requires a disability parking placard for the following reasons:  She cannot walk 200 feet without stopping to rest.  Duration of need: 10 years    If you have any questions or concerns, please don't hesitate to call.     Sincerely,        Aga Miles DO

## 2023-01-31 NOTE — LETTER
[address] [facility]  [facility] [address] [address]  Phone: [phone]  Fax: [phone]    Ellis Brown DO        January 31, 2023     Patient: Tayla Almonte   YOB: 1970   Date of Visit: 1/31/2023       To Whom it May Concern:    Pt son Grzegorz Jara brings mother to [facility]. Tayla Almonte was seen in my clinic on 1/31/2023. She may return to work on 2/1/23. If you have any questions or concerns, please don't hesitate to call.     Sincerely,         Ellis Brown DO/Corine Carrasquillo, RMA

## 2023-01-31 NOTE — PROGRESS NOTES
2023     Martina Puentes (:  1970) is a 46 y.o. female, here for evaluation of the following medical concerns:    HPI    Today the patient followed up on chronic concerns. Overall she feels well. 1.  CVA- patient is needing referral to new PT due to insurance not covering rehab at this itme. , patient continues with outpatient PT/OT and believes she has improved, no longer is restricted to wheelchair or a cane, patient is still limited on her left side particularly left hand and arm, arm is in sling today for protection, communication has improved. She follows with Dr. Meche Collier, Physical Medicine and Rehab. Patient continues off from work and understands her limitations at this time. She does have a backup plan if she is unable to return which in my opinion is likely. 2.  HTN-  controlled at this time, much improved, taking medication as prescribed, no side effects reported from the medication. today, denied headache, vision change, or dizziness. 3.  Allergic rhinitis- she is complaining of rhinorrhea and would like an antihistamine due to the continued nasal drip. The Claritin is working. 4.  Needs screening- mammogram     Today, she denied chest pain, sob, n, v, or diarrhea. Review of Systems   Constitutional:  Positive for fatigue. Negative for activity change, fever and unexpected weight change. Eyes:  Negative for visual disturbance. Respiratory:  Negative for cough and shortness of breath. Cardiovascular:  Negative for chest pain and leg swelling. Gastrointestinal:  Negative for abdominal pain, diarrhea, nausea and vomiting. Endocrine: Negative for cold intolerance, heat intolerance, polydipsia and polyphagia. Musculoskeletal:  Positive for arthralgias and gait problem. Skin:  Negative for rash. Neurological:  Negative for dizziness, numbness and headaches. Psychiatric/Behavioral:  Negative for dysphoric mood. The patient is not nervous/anxious. Prior to Visit Medications    Medication Sig Taking?  Authorizing Provider   Petrolatum 42 % OINT Place 1 Units onto the skin daily Yes Gurjit Addison DO   baclofen (LIORESAL) 10 MG tablet Take 0.5 tablets by mouth 3 times daily as needed (muscle spasms) Yes Gurjit Addison DO   polyethylene glycol (GLYCOLAX) 17 GM/SCOOP powder Take 17 g by mouth daily as needed (constipation) Yes Gurjit Addison DO   aspirin (ASPIRIN LOW DOSE) 81 MG EC tablet TAKE ONE TABLET BY MOUTH DAILY Yes Gurjit Addison DO   baclofen (LIORESAL) 10 MG tablet TAKE ONE TABLET BY MOUTH TWICE A DAY Yes Gurjit Addison, DO   losartan-hydroCHLOROthiazide (HYZAAR) 50-12.5 MG per tablet TAKE ONE TABLET BY MOUTH DAILY Yes Marty Carbajal DO   atorvastatin (LIPITOR) 80 MG tablet TAKE ONE TABLET BY MOUTH ONCE NIGHTLY Yes Gurjit Addison DO   clopidogrel (PLAVIX) 75 MG tablet TAKE ONE TABLET BY MOUTH DAILY Yes Gurjit Addison DO   loratadine (CLARITIN) 10 MG tablet TAKE ONE TABLET BY MOUTH DAILY Yes Gurjit Addison DO   docusate sodium (COLACE) 100 MG capsule TAKE ONE CAPSULE BY MOUTH TWICE A DAY Yes Gurjit Addison DO   potassium chloride (KLOR-CON) 10 MEQ extended release tablet TAKE ONE TABLET BY MOUTH DAILY WITH BREAKFAST Yes Marty Carbajal DO   cloNIDine (CATAPRES) 0.2 MG tablet TAKE ONE TABLET BY MOUTH TWICE A DAY Yes Gurjit Addison DO   pantoprazole (PROTONIX) 40 MG tablet TAKE ONE TABLET BY MOUTH EVERY MORNING BEFORE BREAKFAST Yes Gurjit Addison DO   amLODIPine (NORVASC) 10 MG tablet Take 10 mg by mouth daily Yes Historical Provider, MD   Docusate Calcium (STOOL SOFTENER PO) Take by mouth Yes Historical Provider, MD        Social History     Tobacco Use    Smoking status: Never    Smokeless tobacco: Never   Substance Use Topics    Alcohol use: No        Vitals:    01/31/23 1450   BP: 128/78   Weight: 224 lb (101.6 kg)   Height: 5' 7\" (1.702 m)     Estimated body mass index is 35.08 kg/m² as calculated from the following:    Height as of this encounter: 5' 7\" (1.702 m). Weight as of this encounter: 224 lb (101.6 kg). Physical Exam  Vitals and nursing note reviewed. Constitutional:       Appearance: She is well-developed. HENT:      Head: Normocephalic and atraumatic. Right Ear: External ear normal.      Left Ear: External ear normal.      Nose: Nose normal.   Cardiovascular:      Rate and Rhythm: Normal rate and regular rhythm. Heart sounds: Normal heart sounds. Pulmonary:      Effort: Pulmonary effort is normal.      Breath sounds: Normal breath sounds. No wheezing. Abdominal:      General: Bowel sounds are normal.      Palpations: Abdomen is soft. Tenderness: There is no abdominal tenderness. Musculoskeletal:         General: Normal range of motion. Skin:     General: Skin is warm and dry. Neurological:      Mental Status: She is alert and oriented to person, place, and time. Psychiatric:         Behavior: Behavior normal.       ASSESSMENT/PLAN:  1. Acute ischemic stroke ([address]  Stable  Continue with medication  Keep appointments with specialist.   Answered questions   - Comprehensive Metabolic Panel; Future  - Lipid Panel; Future  - CBC with Auto Differential; Future  - TSH with Reflex to FT4; Future    2. Hypertension, unspecified type  controlled. Discussed signs and symptoms for immediate evaluation in the ER. Reduce sodium. Monitor diet, exercise and lose weight. Keep a BP log and bring it to your next appointment. Needs to rtc in one month for BP check   - Comprehensive Metabolic Panel; Future  - Lipid Panel; Future  - CBC with Auto Differential; Future  - TSH with Reflex to FT4; Future    3. Encounter for screening mammogram for malignant neoplasm of breast  Referred for procedure  Answered questions   - MARIE DIGITAL SCREEN W OR WO CAD BILATERAL; Future    4.  Cervical cancer screening  Referred for procedure   Answered questions   - Julio César Russ MD, Gynecology, [facility] SPECIALTY [facility] in about 6 months (around 7/31/2023).

## 2023-02-04 ASSESSMENT — ENCOUNTER SYMPTOMS
DIARRHEA: 0
VOMITING: 0
NAUSEA: 0
SHORTNESS OF BREATH: 0
ABDOMINAL PAIN: 0
COUGH: 0

## 2023-02-16 RX ORDER — ASPIRIN 81 MG/1
TABLET ORAL
Qty: 30 TABLET | Refills: 0 | Status: SHIPPED | OUTPATIENT
Start: 2023-02-16

## 2023-03-20 RX ORDER — ASPIRIN 81 MG/1
TABLET ORAL
Qty: 30 TABLET | Refills: 0 | Status: SHIPPED | OUTPATIENT
Start: 2023-03-20

## 2023-03-27 RX ORDER — CLOPIDOGREL BISULFATE 75 MG/1
TABLET ORAL
Qty: 90 TABLET | Refills: 1 | Status: SHIPPED | OUTPATIENT
Start: 2023-03-27

## 2023-03-27 RX ORDER — ATORVASTATIN CALCIUM 80 MG/1
TABLET, FILM COATED ORAL
Qty: 90 TABLET | Refills: 1 | Status: SHIPPED | OUTPATIENT
Start: 2023-03-27

## 2023-04-17 RX ORDER — ASPIRIN 81 MG/1
TABLET ORAL
Qty: 30 TABLET | Refills: 0 | Status: SHIPPED | OUTPATIENT
Start: 2023-04-17

## 2023-05-15 RX ORDER — ASPIRIN 81 MG/1
TABLET ORAL
Qty: 30 TABLET | Refills: 0 | Status: SHIPPED | OUTPATIENT
Start: 2023-05-15

## 2023-05-22 RX ORDER — CLONIDINE HYDROCHLORIDE 0.2 MG/1
TABLET ORAL
Qty: 180 TABLET | Refills: 0 | Status: SHIPPED | OUTPATIENT
Start: 2023-05-22

## 2023-05-22 RX ORDER — PANTOPRAZOLE SODIUM 40 MG/1
TABLET, DELAYED RELEASE ORAL
Qty: 90 TABLET | Refills: 0 | Status: SHIPPED | OUTPATIENT
Start: 2023-05-22

## 2023-06-19 RX ORDER — ASPIRIN 81 MG/1
TABLET ORAL
Qty: 30 TABLET | Refills: 0 | Status: SHIPPED | OUTPATIENT
Start: 2023-06-19

## 2023-06-19 NOTE — TELEPHONE ENCOUNTER
Last office visit 1/31/2023       Next office visit scheduled Visit date not found    Requested Prescriptions     Pending Prescriptions Disp Refills    ASPIRIN LOW DOSE 81 MG EC tablet [Pharmacy Med Name: ASPIRIN EC 81 MG TABLET] 30 tablet 0     Sig: TAKE ONE TABLET BY MOUTH DAILY

## 2023-07-10 RX ORDER — LOSARTAN POTASSIUM AND HYDROCHLOROTHIAZIDE 12.5; 5 MG/1; MG/1
1 TABLET ORAL DAILY
Qty: 30 TABLET | Refills: 3 | OUTPATIENT
Start: 2023-07-10

## 2023-07-10 NOTE — TELEPHONE ENCOUNTER
Last office visit 1/31/2023       Next office visit scheduled Visit date not found    Requested Prescriptions     Pending Prescriptions Disp Refills    losartan-hydroCHLOROthiazide (HYZAAR) 50-12.5 MG per tablet 30 tablet 3     Sig: Take 1 tablet by mouth daily

## 2023-07-12 ENCOUNTER — TELEPHONE (OUTPATIENT)
Dept: FAMILY MEDICINE CLINIC | Age: 53
End: 2023-07-12

## 2023-07-12 RX ORDER — LOSARTAN POTASSIUM AND HYDROCHLOROTHIAZIDE 12.5; 5 MG/1; MG/1
1 TABLET ORAL DAILY
Qty: 90 TABLET | Refills: 3 | Status: SHIPPED | OUTPATIENT
Start: 2023-07-12

## 2023-07-12 NOTE — TELEPHONE ENCOUNTER
Pt called to let Dr Vera Geronimo  know that when she talk to her son she will call back and schedule an appt. He is going to take her to the lab on Saturday to do her blood work. He son is the only person she has to bring her in for an appt.  Pt needs a refill on her Losartan-hydrochlorothiazide 50-12.5 mg. Please give pt a call [phone]

## 2023-07-15 DIAGNOSIS — I10 HYPERTENSION, UNSPECIFIED TYPE: ICD-10-CM

## 2023-07-15 DIAGNOSIS — I63.9 ACUTE ISCHEMIC STROKE (HCC): ICD-10-CM

## 2023-07-15 LAB
ALBUMIN SERPL-MCNC: 4.2 G/DL (ref 3.4–5)
ALBUMIN/GLOB SERPL: 1.1 {RATIO} (ref 1.1–2.2)
ALP SERPL-CCNC: 114 U/L (ref 40–129)
ALT SERPL-CCNC: 10 U/L (ref 10–40)
ANION GAP SERPL CALCULATED.3IONS-SCNC: 10 MMOL/L (ref 3–16)
AST SERPL-CCNC: 15 U/L (ref 15–37)
BASOPHILS # BLD: 0 K/UL (ref 0–0.2)
BASOPHILS NFR BLD: 0.8 %
BILIRUB SERPL-MCNC: 0.6 MG/DL (ref 0–1)
BUN SERPL-MCNC: 8 MG/DL (ref 7–20)
CALCIUM SERPL-MCNC: 9.5 MG/DL (ref 8.3–10.6)
CHLORIDE SERPL-SCNC: 101 MMOL/L (ref 99–110)
CHOLEST SERPL-MCNC: 265 MG/DL (ref 0–199)
CO2 SERPL-SCNC: 28 MMOL/L (ref 21–32)
CREAT SERPL-MCNC: 0.8 MG/DL (ref 0.6–1.1)
DEPRECATED RDW RBC AUTO: 14.7 % (ref 12.4–15.4)
EOSINOPHIL # BLD: 0.1 K/UL (ref 0–0.6)
EOSINOPHIL NFR BLD: 2.8 %
GFR SERPLBLD CREATININE-BSD FMLA CKD-EPI: >60 ML/MIN/{1.73_M2}
GLUCOSE SERPL-MCNC: 101 MG/DL (ref 70–99)
HCT VFR BLD AUTO: 37.4 % (ref 36–48)
HDLC SERPL-MCNC: 57 MG/DL (ref 40–60)
HGB BLD-MCNC: 11.8 G/DL (ref 12–16)
LDLC SERPL CALC-MCNC: 193 MG/DL
LYMPHOCYTES # BLD: 1.7 K/UL (ref 1–5.1)
LYMPHOCYTES NFR BLD: 32.5 %
MCH RBC QN AUTO: 24.9 PG (ref 26–34)
MCHC RBC AUTO-ENTMCNC: 31.6 G/DL (ref 31–36)
MCV RBC AUTO: 78.8 FL (ref 80–100)
MONOCYTES # BLD: 0.3 K/UL (ref 0–1.3)
MONOCYTES NFR BLD: 6.3 %
NEUTROPHILS # BLD: 3.1 K/UL (ref 1.7–7.7)
NEUTROPHILS NFR BLD: 57.6 %
PLATELET # BLD AUTO: 273 K/UL (ref 135–450)
PMV BLD AUTO: 8.8 FL (ref 5–10.5)
POTASSIUM SERPL-SCNC: 4 MMOL/L (ref 3.5–5.1)
PROT SERPL-MCNC: 7.9 G/DL (ref 6.4–8.2)
RBC # BLD AUTO: 4.75 M/UL (ref 4–5.2)
SODIUM SERPL-SCNC: 139 MMOL/L (ref 136–145)
TRIGL SERPL-MCNC: 77 MG/DL (ref 0–150)
TSH SERPL DL<=0.005 MIU/L-ACNC: 1.1 UIU/ML (ref 0.27–4.2)
VLDLC SERPL CALC-MCNC: 15 MG/DL
WBC # BLD AUTO: 5.3 K/UL (ref 4–11)

## 2023-07-18 RX ORDER — ASPIRIN 81 MG/1
TABLET ORAL
Qty: 30 TABLET | Refills: 0 | Status: SHIPPED | OUTPATIENT
Start: 2023-07-18

## 2023-07-21 ENCOUNTER — OFFICE VISIT (OUTPATIENT)
Dept: FAMILY MEDICINE CLINIC | Age: 53
End: 2023-07-21
Payer: COMMERCIAL

## 2023-07-21 VITALS
SYSTOLIC BLOOD PRESSURE: 138 MMHG | HEIGHT: 67 IN | DIASTOLIC BLOOD PRESSURE: 88 MMHG | WEIGHT: 223 LBS | BODY MASS INDEX: 35 KG/M2

## 2023-07-21 DIAGNOSIS — I10 HYPERTENSION, UNSPECIFIED TYPE: ICD-10-CM

## 2023-07-21 DIAGNOSIS — I63.9 RIGHT SIDED CEREBRAL HEMISPHERE CEREBROVASCULAR ACCIDENT (CVA) (HCC): Primary | ICD-10-CM

## 2023-07-21 DIAGNOSIS — Z12.11 COLON CANCER SCREENING: ICD-10-CM

## 2023-07-21 PROCEDURE — 1036F TOBACCO NON-USER: CPT | Performed by: FAMILY MEDICINE

## 2023-07-21 PROCEDURE — 3017F COLORECTAL CA SCREEN DOC REV: CPT | Performed by: FAMILY MEDICINE

## 2023-07-21 PROCEDURE — G8427 DOCREV CUR MEDS BY ELIG CLIN: HCPCS | Performed by: FAMILY MEDICINE

## 2023-07-21 PROCEDURE — 99214 OFFICE O/P EST MOD 30 MIN: CPT | Performed by: FAMILY MEDICINE

## 2023-07-21 PROCEDURE — G8417 CALC BMI ABV UP PARAM F/U: HCPCS | Performed by: FAMILY MEDICINE

## 2023-07-21 PROCEDURE — 3074F SYST BP LT 130 MM HG: CPT | Performed by: FAMILY MEDICINE

## 2023-07-21 PROCEDURE — 3078F DIAST BP <80 MM HG: CPT | Performed by: FAMILY MEDICINE

## 2023-07-21 RX ORDER — POLYETHYLENE GLYCOL 3350 17 G/17G
POWDER, FOR SOLUTION ORAL
COMMUNITY
Start: 2023-06-22

## 2023-07-21 RX ORDER — BACLOFEN 20 MG/1
TABLET ORAL
COMMUNITY
Start: 2023-05-23

## 2023-07-21 RX ORDER — LIDOCAINE 50 MG/G
PATCH TOPICAL
COMMUNITY
Start: 2023-07-01

## 2023-07-21 ASSESSMENT — ENCOUNTER SYMPTOMS
TROUBLE SWALLOWING: 0
SINUS PRESSURE: 0
COUGH: 0
DIARRHEA: 0
NAUSEA: 0
SHORTNESS OF BREATH: 0
RHINORRHEA: 0
ABDOMINAL PAIN: 0
FACIAL SWELLING: 0
VOMITING: 0
SORE THROAT: 0

## 2023-07-21 NOTE — PROGRESS NOTES
2023     Isael Long (:  1970) is a 46 y.o. female, here for evaluation of the following medical concerns:    HPI    Today, patient presented for follow up concerning her chronic medical conditions. 1.  CVA- patient is needing referral to new PT due to insurance not covering rehab at this itme. , patient continues with outpatient PT/OT and believes she has improved, no longer is restricted to wheelchair or a cane, patient is still limited on her left side particularly left hand and arm, arm is in sling today for protection, communication has improved. She follows with Dr. Marlo Felty, Physical Medicine and Rehab. Patient continues off from work and understands her limitations at this time. She does have a backup plan if she is unable to return which in my opinion is likely. 2.  HTN-  controlled at this time, much improved, taking medication as prescribed, no side effects reported from the medication. today, denied headache, vision change, or dizziness. 3.  Allergic rhinitis- she is complaining of rhinorrhea and would like an antihistamine due to the continued nasal drip. The Claritin is working. 4.  Colonoscopy needed- never has had this. Today, she denied chest pain, sob, n, v, or diarrhea. Review of Systems   Review of Systems   Constitutional:  Positive for fatigue. Negative for activity change, fever and unexpected weight change. HENT:  Negative for congestion, ear pain, facial swelling, mouth sores, rhinorrhea, sinus pressure, sore throat and trouble swallowing. Eyes:  Negative for visual disturbance. Respiratory:  Negative for cough and shortness of breath. Cardiovascular:  Negative for chest pain and leg swelling. Gastrointestinal:  Negative for abdominal pain, diarrhea, nausea and vomiting. Endocrine: Negative for cold intolerance, heat intolerance, polydipsia and polyphagia. Musculoskeletal:  Positive for arthralgias, joint swelling and myalgias.

## 2023-08-17 ENCOUNTER — TELEPHONE (OUTPATIENT)
Dept: FAMILY MEDICINE CLINIC | Age: 53
End: 2023-08-17

## 2023-08-17 RX ORDER — ASPIRIN 81 MG/1
TABLET ORAL
Qty: 30 TABLET | Refills: 0 | Status: SHIPPED | OUTPATIENT
Start: 2023-08-17

## 2023-08-17 RX ORDER — CLONIDINE HYDROCHLORIDE 0.2 MG/1
TABLET ORAL
Qty: 180 TABLET | Refills: 0 | Status: SHIPPED | OUTPATIENT
Start: 2023-08-17

## 2023-08-17 NOTE — TELEPHONE ENCOUNTER
Dr. Vera Geronimo, pt called & stated she is allergic to shellfish and she carries benadryl with her now since she is scared of needles and does not use epi-pen. Is it okay for her to take benadryl with   her medications she is currently on, especially the BP meds, with no   interaction concern? Please call her & advise.  OPALM ok

## 2023-08-17 NOTE — TELEPHONE ENCOUNTER
Last office visit 7/21/2023       Next office visit scheduled 8/21/2023    Requested Prescriptions     Pending Prescriptions Disp Refills    cloNIDine (CATAPRES) 0.2 MG tablet [Pharmacy Med Name: cloNIDine HCL 0.2MG TABLET] 180 tablet 0     Sig: TAKE 1 TABLET BY MOUTH TWICE A DAY    ASPIRIN LOW DOSE 81 MG EC tablet [Pharmacy Med Name: ASPIRIN EC 81 MG TABLET] 30 tablet 0     Sig: TAKE 1 TABLET BY MOUTH DAILY

## 2023-08-17 NOTE — TELEPHONE ENCOUNTER
Patient:  Stan Bertrand Date:  2021   :  1956    65 year old male           HEPATOLOGY CONSULT:      Chief Complaint   Patient presents with   • Office Visit   • Follow-up   • Hepatitis C         History of Present Illness:   This is a 64-year-old  gentleman with history of hepatitis C was seen at consultation on 2021.  Patient has history of hepatitis C genotype 1a with low viral load.  Patient claims the new used to do injection drugs  for recreation use in his early in 30s.  He also used to drink alcohol as almost on regular basis.  However on 10/2020 when he was diagnosed with hepatitis C, he quit drinking alcohol as he was advised by his physician.  He also quit smoking at the same time.  On the process of workup for hepatitis C treatment he was noted to have HCC with high AFP.  On21 CT 4 Phase demonstrated segment 7/8 a 7.5 cm , LR 5 lesion with findings also suggestive of cirrhosis. AFP is elevated to 8,935. Repeat AFP on 21 was 10,704. Patient underwent for Y90 treatment on 2021.  He was seen by Transplant surgery Clinic on 2021 and had a repeat CT scan on 2021.    Patient is here for follow-up.  Patient denies any development of ascites .  He admits to have some lower extremity tingling sensation with defined posture.  He admits he has good appetite.  No history suggestive of hepatic encephalopathy.  His wife appears to be anxious about hepatitis C treatment.      Past Medical History:    No known problems                                             Hepatitis C                                                   Past Surgical History:   Procedure Laterality Date   • Ir therasphere embolization  2021   • Mesenteric angiogram/poss pta  2021   • No past surgeries         Family History   Problem Relation Age of Onset   • Cancer Mother    • Cancer Father    • Heart disease Brother        ALLERGIES:  No Known Allergies    Social History:  Social  Yes she can take benadryl History     Tobacco Use   • Smoking status: Former Smoker     Types: Cigars     Start date: 1975     Quit date: 2020     Years since quittin.6   • Smokeless tobacco: Never Used   Substance Use Topics   • Alcohol use: Yes     Alcohol/week: 3.0 standard drinks     Types: 3 Shots of liquor per week     Comment: cocktail       Current Medications    AMOXICILLIN-CLAVULANATE (AUGMENTIN) 875-125 MG PER TABLET    Take 1 tablet by mouth every 12 hours. Take with food.    DEXTROMETHORPHAN-GUAIFENESIN (MUCINEX DM)  MG TABLET SR 12 HR    Take 1 tablet by mouth 2 times daily as needed.    MULTIPLE VITAMINS-MINERALS (MULTIVITAMIN ADULT PO)        OMEPRAZOLE 20 MG TABLET    Take 1 tablet by mouth daily. Start medication on day of 1st IR procedure. Take until done. No refill needed.     Review of Systems:   Constitutional:  Negative for fever,weight loss.  HENT:  Negative for congestion and sneezing.   Eyes:  Negative for discharge and redness.   Respiratory:  Negative for cough, shortness of breath and wheezing.   Cardiovascular:  Negative for chest pain and palpitations.   Gastrointestinal:  Negative for nausea, vomiting, abdominal pain, diarrhea, constipation and abdominal distention.   Genitourinary:  Negative for dysuria and hematuria.   Musculoskeletal:  Negative for back pain and joint swelling.   Skin:  Negative for color change, pallor and rash.   Neurological:  Negative for tremors and headaches.   Psychiatric/Behavioral:  Negative for suicidal ideas and confusion.                                       Physical Examination:   Visit Vitals  /71 (BP Location: LUE - Left upper extremity, Patient Position: Sitting, Cuff Size: Regular)   Pulse 92   Ht 5' 8\" (1.727 m)   Wt 90 kg   SpO2 98%   BMI 30.17 kg/m²     HEENT:  Normocephalic, atraumatic.  No scleral icterus.  Pupils equal and reactive to light and accommodation.  Normal conjunctivae.  External ears normal.  Oropharynx clear and moist.  No  exudate.  Nose normal.   NECK:  Full range of movement, supple.  Trachea central.  No thyromegaly or masses. No jugular venous distention.  No cervical or supraclavicular adenopathy.   CHEST:  Good respiratory effort.  Vesicular breath sounds.  No wheezes or rales.   CARDIOVASCULAR:  Normal rate.  Regular rhythm.  S1, S2 normal.  No murmurs, rubs or gallops.   ABDOMEN:  Soft, nontender, obese.  No hepatosplenomegaly.  No umbilical or inguinal hernias.  No masses.  Bowel sounds normal.  No ascites.   NEUROLOGIC:  Alert and oriented x3.  No cranial nerve deficit.  No musculoskeletal deficit.  Normal ROM (range of motion) in all extremities.  No asterixis or wrist clonus.   SKIN:  Warm to touch.  No rash.  No spider nevi.  PSYCHIATRIC:  Mood and affect are normal.  Behavior, thought content, judgment and speech normal.   EXTREMITIES:  Positive trace edema.  No cyanosis.  No digital clubbing.      Labs:  Reviewed In EPIC.  MELD-Na score: 6 at 4/2/2021 10:35 AM  MELD score: 6 at 4/2/2021 10:35 AM  Calculated from:  Serum Creatinine: 0.91 mg/dL (Rounded to 1 mg/dL) at 4/2/2021 10:35 AM  Serum Sodium: 140 mmol/L (Rounded to 137 mmol/L) at 4/2/2021 10:29 AM  Total Bilirubin: 0.5 mg/dL (Rounded to 1 mg/dL) at 4/2/2021 10:29 AM  INR(ratio): 1.0 at 4/2/2021 10:29 AM  Age: 65 years 1 month     Radiology Findings:  Multi phase CT of liver on 04/02/2021:  IMPRESSION:  Status post radioembolization of right hepatic lobe tumor one month ago  with residual posterior superior contrast enhancement and washout that  could represent residual tumor supplied by a parasitized diaphragmatic  branch or posttreatment effects. Continued follow-up is suggested     Firbo-scan on 4/8/2021  Results:   Elastography: 17.2 kpa, 7% IQR  CAP: 204 dB/m     Interpretation:  Fibrosis Stage: F4 - Cirrhosis  Steatosis Grade: S0 (indicates <5% steatosis)      Assessment and Plan:    This is a 65 year old  AAM with a history of  Hepatitis C cirrhosis and HCC  has been seen at follow-up.    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  --clinically patient does not have any volume overload except new development of trace lower extremity edema.  Patient is status post Y 90 treatment on 03/01/2021.  --repeat CT in a month later suggested residual tumor.  --discussed with IR today and the plan is to repeat CT at 3 months post Y 90 to recommend further treatment option      Chronic Hepatitis C, with viral load (315,980 i.u/mL) and genotype 1a.  --patient is treatment naive.  --patient will be treated for hepatitis C after having the tumor response to treatment.    Cirrhosis:  Currently patient is having trace edema without ascites likely the residual effect of Y 90 treatment.  Patient was encouraged to maintain adequate nutrition.  --will repeat the AFP and meld labs prior to next CT scan at 3 months post Y 90 treatment.    I had a long discussion with patient and patient's wife regarding the further management of the patient.     --Immunization against Hepatitis A Virus/Hepatitis B Virus: Immune to hepatitis A . He received two doses of hepatitis B vaccine.     Follow up appointment:  3 months    Shital Goyal MD  Total time 30 minutes, more than half spent counseling.

## 2023-08-17 NOTE — TELEPHONE ENCOUNTER
----- Message from Dhruv Pickett sent at 8/17/2023 10:45 AM EDT -----  Subject: Message to Provider    QUESTIONS  Information for Provider? Dr. Lisa Burnette, pt called & stated she is allergic   to shellfish and she carries benadryl with her now since she is scared of   needles and does not use epi-pen. Is it okay for her to take benadryl with   her medications she is currently on, especially the BP meds, with no   interaction concern? Please call her & advise. JULIO ok.  ---------------------------------------------------------------------------  --------------  Elissa Ye [facility]  [phone]; OK to leave message on voicemail  ---------------------------------------------------------------------------  --------------  SCRIPT ANSWERS  Relationship to Patient?  Self

## 2023-08-18 RX ORDER — PANTOPRAZOLE SODIUM 40 MG/1
TABLET, DELAYED RELEASE ORAL
Qty: 90 TABLET | Refills: 0 | Status: SHIPPED | OUTPATIENT
Start: 2023-08-18

## 2023-08-21 ENCOUNTER — NURSE ONLY (OUTPATIENT)
Dept: FAMILY MEDICINE CLINIC | Age: 53
End: 2023-08-21
Payer: COMMERCIAL

## 2023-08-21 DIAGNOSIS — I10 HYPERTENSION, UNSPECIFIED TYPE: ICD-10-CM

## 2023-08-21 LAB
BASOPHILS # BLD: 0 K/UL (ref 0–0.2)
BASOPHILS NFR BLD: 0.6 %
DEPRECATED RDW RBC AUTO: 15.6 % (ref 12.4–15.4)
EOSINOPHIL # BLD: 0.2 K/UL (ref 0–0.6)
EOSINOPHIL NFR BLD: 3.1 %
HCT VFR BLD AUTO: 37.1 % (ref 36–48)
HGB BLD-MCNC: 12.9 G/DL (ref 12–16)
LYMPHOCYTES # BLD: 1.5 K/UL (ref 1–5.1)
LYMPHOCYTES NFR BLD: 30.2 %
MCH RBC QN AUTO: 27.2 PG (ref 26–34)
MCHC RBC AUTO-ENTMCNC: 34.7 G/DL (ref 31–36)
MCV RBC AUTO: 78.4 FL (ref 80–100)
MONOCYTES # BLD: 0.3 K/UL (ref 0–1.3)
MONOCYTES NFR BLD: 6.2 %
NEUTROPHILS # BLD: 3.1 K/UL (ref 1.7–7.7)
NEUTROPHILS NFR BLD: 59.9 %
PLATELET # BLD AUTO: 220 K/UL (ref 135–450)
PMV BLD AUTO: 9.3 FL (ref 5–10.5)
RBC # BLD AUTO: 4.73 M/UL (ref 4–5.2)
WBC # BLD AUTO: 5.1 K/UL (ref 4–11)

## 2023-08-21 PROCEDURE — 36415 COLL VENOUS BLD VENIPUNCTURE: CPT | Performed by: FAMILY MEDICINE

## 2023-09-15 RX ORDER — ASPIRIN 81 MG/1
TABLET ORAL
Qty: 30 TABLET | Refills: 0 | Status: SHIPPED | OUTPATIENT
Start: 2023-09-15

## 2023-09-15 NOTE — TELEPHONE ENCOUNTER
Last office visit 7/21/2023       Next office visit scheduled 1/23/2024    Requested Prescriptions     Pending Prescriptions Disp Refills    ASPIRIN LOW DOSE 81 MG EC tablet [Pharmacy Med Name: ASPIRIN EC 81 MG TABLET] 30 tablet 0     Sig: TAKE 1 TABLET BY MOUTH DAILY    r

## 2023-09-25 RX ORDER — CLOPIDOGREL BISULFATE 75 MG/1
TABLET ORAL
Qty: 90 TABLET | Refills: 1 | Status: SHIPPED | OUTPATIENT
Start: 2023-09-25

## 2023-09-25 RX ORDER — ATORVASTATIN CALCIUM 80 MG/1
TABLET, FILM COATED ORAL
Qty: 90 TABLET | Refills: 1 | Status: SHIPPED | OUTPATIENT
Start: 2023-09-25

## 2023-09-25 NOTE — TELEPHONE ENCOUNTER
Last office visit 7/21/2023       Next office visit scheduled 1/23/2024    Requested Prescriptions     Pending Prescriptions Disp Refills    clopidogrel (PLAVIX) 75 MG tablet [Pharmacy Med Name: CLOPIDOGREL 75 MG TABLET] 90 tablet 1     Sig: TAKE ONE TABLET BY MOUTH DAILY    atorvastatin (LIPITOR) 80 MG tablet [Pharmacy Med Name: ATORVASTATIN 80 MG TABLET] 90 tablet 1     Sig: TAKE ONE TABLET BY MOUTH ONCE NIGHTLY

## 2023-10-16 RX ORDER — ASPIRIN 81 MG/1
TABLET ORAL
Qty: 30 TABLET | Refills: 0 | Status: SHIPPED | OUTPATIENT
Start: 2023-10-16

## 2023-10-16 NOTE — TELEPHONE ENCOUNTER
Last office visit 7/21/2023     Last written      Next office visit scheduled 1/23/2024    Requested Prescriptions     Pending Prescriptions Disp Refills    aspirin (ASPIRIN LOW DOSE) 81 MG EC tablet [Pharmacy Med Name: ASPIRIN EC 81 MG TABLET] 30 tablet 0     Sig: TAKE 1 TABLET BY MOUTH DAILY

## 2023-11-13 RX ORDER — ASPIRIN 81 MG/1
TABLET ORAL
Qty: 30 TABLET | Refills: 0 | Status: SHIPPED | OUTPATIENT
Start: 2023-11-13

## 2023-11-14 RX ORDER — PANTOPRAZOLE SODIUM 40 MG/1
TABLET, DELAYED RELEASE ORAL
Qty: 90 TABLET | Refills: 0 | Status: SHIPPED | OUTPATIENT
Start: 2023-11-14

## 2023-11-14 RX ORDER — CLONIDINE HYDROCHLORIDE 0.2 MG/1
TABLET ORAL
Qty: 180 TABLET | Refills: 0 | Status: SHIPPED | OUTPATIENT
Start: 2023-11-14

## 2023-11-14 NOTE — TELEPHONE ENCOUNTER
Last office visit 7/21/2023     Last written     Next office visit scheduled 1/23/2024    Requested Prescriptions     Pending Prescriptions Disp Refills    pantoprazole (PROTONIX) 40 MG tablet [Pharmacy Med Name: PANTOPRAZOLE SOD DR 40 MG TAB] 90 tablet 0     Sig: TAKE ONE TABLET BY MOUTH EVERY MORNING BEFORE BREAKFAST    cloNIDine (CATAPRES) 0.2 MG tablet [Pharmacy Med Name: cloNIDine HCL 0.2MG TABLET] 180 tablet 0     Sig: TAKE 1 TABLET BY MOUTH TWICE A DAY

## 2023-12-13 RX ORDER — ASPIRIN 81 MG/1
TABLET ORAL
Qty: 30 TABLET | Refills: 0 | Status: SHIPPED | OUTPATIENT
Start: 2023-12-13

## 2024-01-23 ENCOUNTER — OFFICE VISIT (OUTPATIENT)
Dept: FAMILY MEDICINE CLINIC | Age: 54
End: 2024-01-23
Payer: MEDICARE

## 2024-01-23 VITALS
DIASTOLIC BLOOD PRESSURE: 98 MMHG | HEIGHT: 67 IN | SYSTOLIC BLOOD PRESSURE: 142 MMHG | BODY MASS INDEX: 34.69 KG/M2 | WEIGHT: 221 LBS

## 2024-01-23 DIAGNOSIS — Z12.11 COLON CANCER SCREENING: ICD-10-CM

## 2024-01-23 DIAGNOSIS — I10 HYPERTENSION, UNSPECIFIED TYPE: Primary | ICD-10-CM

## 2024-01-23 DIAGNOSIS — I63.9 ACUTE ISCHEMIC STROKE (HCC): ICD-10-CM

## 2024-01-23 PROCEDURE — G8427 DOCREV CUR MEDS BY ELIG CLIN: HCPCS | Performed by: FAMILY MEDICINE

## 2024-01-23 PROCEDURE — 3077F SYST BP >= 140 MM HG: CPT | Performed by: FAMILY MEDICINE

## 2024-01-23 PROCEDURE — G8484 FLU IMMUNIZE NO ADMIN: HCPCS | Performed by: FAMILY MEDICINE

## 2024-01-23 PROCEDURE — G8417 CALC BMI ABV UP PARAM F/U: HCPCS | Performed by: FAMILY MEDICINE

## 2024-01-23 PROCEDURE — 3080F DIAST BP >= 90 MM HG: CPT | Performed by: FAMILY MEDICINE

## 2024-01-23 PROCEDURE — 3017F COLORECTAL CA SCREEN DOC REV: CPT | Performed by: FAMILY MEDICINE

## 2024-01-23 PROCEDURE — 99214 OFFICE O/P EST MOD 30 MIN: CPT | Performed by: FAMILY MEDICINE

## 2024-01-23 PROCEDURE — 1036F TOBACCO NON-USER: CPT | Performed by: FAMILY MEDICINE

## 2024-01-23 ASSESSMENT — PATIENT HEALTH QUESTIONNAIRE - PHQ9
SUM OF ALL RESPONSES TO PHQ QUESTIONS 1-9: 0
1. LITTLE INTEREST OR PLEASURE IN DOING THINGS: 0
2. FEELING DOWN, DEPRESSED OR HOPELESS: 0
SUM OF ALL RESPONSES TO PHQ QUESTIONS 1-9: 0
SUM OF ALL RESPONSES TO PHQ9 QUESTIONS 1 & 2: 0

## 2024-01-23 ASSESSMENT — ENCOUNTER SYMPTOMS
ABDOMINAL PAIN: 0
SHORTNESS OF BREATH: 0
NAUSEA: 0
VOMITING: 0
CHEST TIGHTNESS: 0
COUGH: 0
WHEEZING: 0

## 2024-01-23 NOTE — PROGRESS NOTES
2024     Dona Meyers (:  1970) is a 53 y.o. female, here for evaluation of the following medical concerns:    Hypertension  Pertinent negatives include no chest pain, headaches or shortness of breath.     Today, patient presented for follow up concerning her chronic medical conditions.      1.  CVA- patient is needing referral to new PT due to insurance not covering rehab at this itme. , patient continues with outpatient PT/OT and believes she has improved, no longer is restricted to wheelchair or a cane, patient is still limited on her left side particularly left hand and arm, arm is in sling today for protection, communication has improved.  She follows with Dr. Rangel, Physical Medicine and Rehab.  Patient continues off from work and understands her limitations at this time.  She does have a backup plan if she is unable to return which in my opinion is likely.      2.  HTN-  controlled at this time, much improved, taking medication as prescribed, no side effects reported from the medication. today, denied headache, vision change, or dizziness.  WHITE COAT SYNDROME     3.  Allergic rhinitis- rhinorrhea is better controledand would like an antihistamine due to the continued nasal drip.  The Claritin is working.      4.  Colonoscopy needed- never has had this. She understands and is planning later this year.      Today, she denied chest pain, sob, n, v, or diarrhea.   Review of Systems   Constitutional:  Positive for fatigue. Negative for activity change, fever and unexpected weight change.   Respiratory:  Negative for cough, chest tightness, shortness of breath and wheezing.    Cardiovascular:  Negative for chest pain and leg swelling.   Gastrointestinal:  Negative for abdominal pain, diarrhea, nausea and vomiting.   Genitourinary:  Negative for vaginal bleeding.   Musculoskeletal:  Positive for arthralgias.   Skin:  Negative for rash.   Neurological:  Negative for dizziness, tremors, syncope,

## 2024-01-30 ENCOUNTER — OFFICE VISIT (OUTPATIENT)
Dept: FAMILY MEDICINE CLINIC | Age: 54
End: 2024-01-30
Payer: MEDICARE

## 2024-01-30 DIAGNOSIS — Z00.00 WELCOME TO MEDICARE PREVENTIVE VISIT: Primary | ICD-10-CM

## 2024-01-30 PROCEDURE — 3017F COLORECTAL CA SCREEN DOC REV: CPT | Performed by: FAMILY MEDICINE

## 2024-01-30 PROCEDURE — G0402 INITIAL PREVENTIVE EXAM: HCPCS | Performed by: FAMILY MEDICINE

## 2024-01-30 ASSESSMENT — PATIENT HEALTH QUESTIONNAIRE - PHQ9
SUM OF ALL RESPONSES TO PHQ QUESTIONS 1-9: 0
2. FEELING DOWN, DEPRESSED OR HOPELESS: 0
SUM OF ALL RESPONSES TO PHQ9 QUESTIONS 1 & 2: 0
SUM OF ALL RESPONSES TO PHQ QUESTIONS 1-9: 0
1. LITTLE INTEREST OR PLEASURE IN DOING THINGS: 0

## 2024-01-30 ASSESSMENT — LIFESTYLE VARIABLES
HOW MANY STANDARD DRINKS CONTAINING ALCOHOL DO YOU HAVE ON A TYPICAL DAY: PATIENT DOES NOT DRINK
HOW OFTEN DO YOU HAVE A DRINK CONTAINING ALCOHOL: NEVER

## 2024-01-30 NOTE — PROGRESS NOTES
Medicare Annual Wellness Visit    Dona Meyers is here for Medicare AW    Assessment & Plan     Recommendations for Preventive Services Due: see orders and patient instructions/AVS.  Recommended screening schedule for the next 5-10 years is provided to the patient in written form: see Patient Instructions/AVS.     No follow-ups on file.     Subjective   The following acute and/or chronic problems were also addressed today:  Chronic medical concerns    Patient's complete Health Risk Assessment and screening values have been reviewed and are found in Flowsheets. The following problems were reviewed today and where indicated follow up appointments were made and/or referrals ordered.    Positive Risk Factor Screenings with Interventions:                Activity, Diet, and Weight:  On average, how many days per week do you engage in moderate to strenuous exercise (like a brisk walk)?: 0 days  On average, how many minutes do you engage in exercise at this level?: 0 min    Do you eat balanced/healthy meals regularly?: Yes    There is no height or weight on file to calculate BMI. (!) Abnormal    Obesity Interventions:  Patient comments: patient is trying to exercise more, be more active                ADL's:   Patient reports needing help with:  Select all that apply: (!) Bathing, Dressing                  Objective   There were no vitals filed for this visit.   There is no height or weight on file to calculate BMI.      NO PE       Allergies   Allergen Reactions    Other Hives    Shellfish-Derived Products Hives    Cephalexin     Codeine     Erythromycin     Pcn [Penicillins]      Prior to Visit Medications    Medication Sig Taking? Authorizing Provider   aspirin (ASPIRIN LOW DOSE) 81 MG EC tablet TAKE 1 TABLET BY MOUTH DAILY  Gurjit Addison, DO   pantoprazole (PROTONIX) 40 MG tablet TAKE ONE TABLET BY MOUTH EVERY MORNING BEFORE BREAKFAST  Gurjti Addison, DO   cloNIDine (CATAPRES) 0.2 MG tablet TAKE 1 TABLET BY MOUTH

## 2024-07-23 ENCOUNTER — TELEMEDICINE (OUTPATIENT)
Dept: FAMILY MEDICINE CLINIC | Age: 54
End: 2024-07-23
Payer: MEDICARE

## 2024-07-23 DIAGNOSIS — I10 HYPERTENSION, UNSPECIFIED TYPE: ICD-10-CM

## 2024-07-23 DIAGNOSIS — I63.9 ACUTE ISCHEMIC STROKE (HCC): Primary | ICD-10-CM

## 2024-07-23 DIAGNOSIS — Z12.31 ENCOUNTER FOR SCREENING MAMMOGRAM FOR MALIGNANT NEOPLASM OF BREAST: ICD-10-CM

## 2024-07-23 PROCEDURE — 3017F COLORECTAL CA SCREEN DOC REV: CPT | Performed by: FAMILY MEDICINE

## 2024-07-23 PROCEDURE — G8417 CALC BMI ABV UP PARAM F/U: HCPCS | Performed by: FAMILY MEDICINE

## 2024-07-23 PROCEDURE — 1036F TOBACCO NON-USER: CPT | Performed by: FAMILY MEDICINE

## 2024-07-23 PROCEDURE — G8427 DOCREV CUR MEDS BY ELIG CLIN: HCPCS | Performed by: FAMILY MEDICINE

## 2024-07-23 PROCEDURE — 99214 OFFICE O/P EST MOD 30 MIN: CPT | Performed by: FAMILY MEDICINE

## 2024-07-23 RX ORDER — CLONIDINE HYDROCHLORIDE 0.2 MG/1
0.2 TABLET ORAL 2 TIMES DAILY
Qty: 180 TABLET | Refills: 0 | Status: SHIPPED | OUTPATIENT
Start: 2024-07-23

## 2024-07-23 RX ORDER — ASPIRIN 81 MG/1
TABLET ORAL
Qty: 30 TABLET | Refills: 0 | Status: SHIPPED | OUTPATIENT
Start: 2024-07-23

## 2024-07-23 RX ORDER — PANTOPRAZOLE SODIUM 40 MG/1
40 TABLET, DELAYED RELEASE ORAL
Qty: 90 TABLET | Refills: 2 | Status: SHIPPED | OUTPATIENT
Start: 2024-07-23

## 2024-07-23 RX ORDER — ATORVASTATIN CALCIUM 80 MG/1
80 TABLET, FILM COATED ORAL NIGHTLY
Qty: 90 TABLET | Refills: 1 | Status: SHIPPED | OUTPATIENT
Start: 2024-07-23

## 2024-07-23 RX ORDER — DOCUSATE SODIUM 100 MG/1
100 CAPSULE, LIQUID FILLED ORAL 2 TIMES DAILY
Qty: 180 CAPSULE | Refills: 2 | Status: SHIPPED | OUTPATIENT
Start: 2024-07-23

## 2024-07-23 RX ORDER — LOSARTAN POTASSIUM AND HYDROCHLOROTHIAZIDE 12.5; 5 MG/1; MG/1
1 TABLET ORAL DAILY
Qty: 90 TABLET | Refills: 2 | Status: SHIPPED | OUTPATIENT
Start: 2024-07-23

## 2024-07-23 RX ORDER — LORATADINE 10 MG/1
10 TABLET ORAL DAILY
Qty: 90 TABLET | Refills: 2 | Status: SHIPPED | OUTPATIENT
Start: 2024-07-23

## 2024-07-23 RX ORDER — CLOPIDOGREL BISULFATE 75 MG/1
75 TABLET ORAL DAILY
Qty: 90 TABLET | Refills: 2 | Status: SHIPPED | OUTPATIENT
Start: 2024-07-23

## 2024-07-23 ASSESSMENT — ENCOUNTER SYMPTOMS
DIARRHEA: 0
COUGH: 0
ABDOMINAL PAIN: 0
NAUSEA: 0
VOMITING: 0
SHORTNESS OF BREATH: 0

## 2024-07-23 NOTE — PROGRESS NOTES
2024    TELEHEALTH EVALUATION -- Audio/Visual    HPI:    Dona Meyers (:  1970) has requested an audio/video evaluation for the following concern(s):    Dona Meyers (:  1970) is a 53 y.o. female, here for evaluation of the following medical concerns:     Hypertension  Pertinent negatives include no chest pain, headaches or shortness of breath.      Today, patient presented for follow up concerning her chronic medical conditions.      1.  CVA- patient is needing referral to new PT due to insurance not covering rehab at this itme. , patient continues with outpatient PT/OT and believes she has improved, no longer is restricted to wheelchair or a cane, patient is still limited on her left side particularly left hand and arm, arm is in sling today for protection, communication has improved.  She follows with Dr. Rangel, Physical Medicine and Rehab.  Patient continues off from work and understands her limitations at this time.  She does have a backup plan if she is unable to return which in my opinion is likely.      2.  HTN-  controlled at this time, much improved, taking medication as prescribed, no side effects reported from the medication. today, denied headache, vision change, or dizziness.  WHITE COAT SYNDROME     3.  Allergic rhinitis- rhinorrhea is better controledand would like an antihistamine due to the continued nasal drip.  The Claritin is working.      4.  mammogram/Colonoscopy/pap needed- never has had this. She understands and is planning later this year.  I have reminded her that this rules out cancer and \"don't wait until its too late\", she understood.      Today, she denied chest pain, sob, n, v, or diarrhea.     Review of Systems   Constitutional:  Positive for fatigue. Negative for activity change.   Respiratory:  Negative for cough and shortness of breath.    Cardiovascular:  Negative for chest pain and palpitations.   Gastrointestinal:  Negative for abdominal pain, diarrhea,

## 2024-10-21 RX ORDER — CLONIDINE HYDROCHLORIDE 0.2 MG/1
0.2 TABLET ORAL 2 TIMES DAILY
Qty: 180 TABLET | Refills: 0 | Status: SHIPPED | OUTPATIENT
Start: 2024-10-21

## 2024-10-21 NOTE — TELEPHONE ENCOUNTER
Last office visit 7/23/2024       Next office visit scheduled Visit date not found    Requested Prescriptions     Pending Prescriptions Disp Refills    cloNIDine (CATAPRES) 0.2 MG tablet [Pharmacy Med Name: cloNIDine HCL 0.2MG TABLET] 180 tablet 0     Sig: TAKE 1 TABLET BY MOUTH 2 TIMES A DAY

## 2025-01-20 ENCOUNTER — TELEPHONE (OUTPATIENT)
Dept: PRIMARY CARE CLINIC | Age: 55
End: 2025-01-20

## 2025-01-20 DIAGNOSIS — I63.9 ACUTE ISCHEMIC STROKE (HCC): Primary | ICD-10-CM

## 2025-01-20 DIAGNOSIS — I10 HYPERTENSION, UNSPECIFIED TYPE: ICD-10-CM

## 2025-01-20 RX ORDER — ATORVASTATIN CALCIUM 80 MG/1
80 TABLET, FILM COATED ORAL NIGHTLY
Qty: 90 TABLET | Refills: 1 | Status: SHIPPED | OUTPATIENT
Start: 2025-01-20

## 2025-01-20 RX ORDER — CLONIDINE HYDROCHLORIDE 0.2 MG/1
0.2 TABLET ORAL 2 TIMES DAILY
Qty: 180 TABLET | Refills: 0 | Status: SHIPPED | OUTPATIENT
Start: 2025-01-20

## 2025-01-20 NOTE — TELEPHONE ENCOUNTER
Left message for patient to call back for recommendations per TB.  Message sent to patient MC.

## 2025-01-20 NOTE — TELEPHONE ENCOUNTER
----- Message from Radha COTTO sent at 1/20/2025 11:03 AM EST -----  Regarding: [facility] Referral Request  [facility] Referral Request    Reason for referral request: Lab/Test Order    Specialist/Lab/Test patient is requesting (if known): Blood work    Specialist Phone Number (if applicable):    Additional Information Patient want to have blood work done.  --------------------------------------------------------------------------------------------------------------------------    Relationship to Patient: Self     Call Back Information: OK to leave message on voicemail  Preferred Call Back Number: Phone [phone]

## 2025-01-20 NOTE — TELEPHONE ENCOUNTER
Last office visit 7/23/2024       Next office visit scheduled Visit date not found    Requested Prescriptions     Pending Prescriptions Disp Refills    atorvastatin (LIPITOR) 80 MG tablet [Pharmacy Med Name: ATORVASTATIN 80 MG TABLET] 90 tablet 1     Sig: TAKE ONE TABLET BY MOUTH ONCE NIGHTLY    cloNIDine (CATAPRES) 0.2 MG tablet [Pharmacy Med Name: cloNIDine HCL 0.2MG TABLET] 180 tablet 0     Sig: TAKE 1 TABLET BY MOUTH 2 TIMES A DAY

## 2025-01-31 DIAGNOSIS — I10 HYPERTENSION, UNSPECIFIED TYPE: ICD-10-CM

## 2025-01-31 DIAGNOSIS — I63.9 ACUTE ISCHEMIC STROKE (HCC): ICD-10-CM

## 2025-02-01 LAB
ALBUMIN SERPL-MCNC: 4.5 G/DL (ref 3.4–5)
ALBUMIN/GLOB SERPL: 1.2 {RATIO} (ref 1.1–2.2)
ALP SERPL-CCNC: 136 U/L (ref 40–129)
ALT SERPL-CCNC: 23 U/L (ref 10–40)
ANION GAP SERPL CALCULATED.3IONS-SCNC: 12 MMOL/L (ref 3–16)
AST SERPL-CCNC: 23 U/L (ref 15–37)
BASOPHILS # BLD: 0 K/UL (ref 0–0.2)
BASOPHILS NFR BLD: 0.6 %
BILIRUB SERPL-MCNC: 0.7 MG/DL (ref 0–1)
BUN SERPL-MCNC: 10 MG/DL (ref 7–20)
CALCIUM SERPL-MCNC: 10 MG/DL (ref 8.3–10.6)
CHLORIDE SERPL-SCNC: 103 MMOL/L (ref 99–110)
CHOLEST SERPL-MCNC: 190 MG/DL (ref 0–199)
CO2 SERPL-SCNC: 27 MMOL/L (ref 21–32)
CREAT SERPL-MCNC: 0.8 MG/DL (ref 0.6–1.1)
DEPRECATED RDW RBC AUTO: 14.5 % (ref 12.4–15.4)
EOSINOPHIL # BLD: 0.3 K/UL (ref 0–0.6)
EOSINOPHIL NFR BLD: 4.9 %
GFR SERPLBLD CREATININE-BSD FMLA CKD-EPI: 87 ML/MIN/{1.73_M2}
GLUCOSE SERPL-MCNC: 100 MG/DL (ref 70–99)
HCT VFR BLD AUTO: 44.2 % (ref 36–48)
HDLC SERPL-MCNC: 55 MG/DL (ref 40–60)
HGB BLD-MCNC: 14.7 G/DL (ref 12–16)
LDLC SERPL CALC-MCNC: 120 MG/DL
LYMPHOCYTES # BLD: 1.6 K/UL (ref 1–5.1)
LYMPHOCYTES NFR BLD: 30.3 %
MCH RBC QN AUTO: 28.9 PG (ref 26–34)
MCHC RBC AUTO-ENTMCNC: 33.2 G/DL (ref 31–36)
MCV RBC AUTO: 87.1 FL (ref 80–100)
MONOCYTES # BLD: 0.3 K/UL (ref 0–1.3)
MONOCYTES NFR BLD: 4.9 %
NEUTROPHILS # BLD: 3.1 K/UL (ref 1.7–7.7)
NEUTROPHILS NFR BLD: 59.3 %
PLATELET # BLD AUTO: 243 K/UL (ref 135–450)
PMV BLD AUTO: 9.6 FL (ref 5–10.5)
POTASSIUM SERPL-SCNC: 4.2 MMOL/L (ref 3.5–5.1)
PROT SERPL-MCNC: 8.3 G/DL (ref 6.4–8.2)
RBC # BLD AUTO: 5.08 M/UL (ref 4–5.2)
SODIUM SERPL-SCNC: 142 MMOL/L (ref 136–145)
TRIGL SERPL-MCNC: 76 MG/DL (ref 0–150)
TSH SERPL DL<=0.005 MIU/L-ACNC: 1.08 UIU/ML (ref 0.27–4.2)
VLDLC SERPL CALC-MCNC: 15 MG/DL
WBC # BLD AUTO: 5.2 K/UL (ref 4–11)

## 2025-02-18 ENCOUNTER — OFFICE VISIT (OUTPATIENT)
Dept: PRIMARY CARE CLINIC | Age: 55
End: 2025-02-18

## 2025-02-18 VITALS
HEIGHT: 67 IN | SYSTOLIC BLOOD PRESSURE: 138 MMHG | RESPIRATION RATE: 18 BRPM | BODY MASS INDEX: 32.77 KG/M2 | HEART RATE: 70 BPM | TEMPERATURE: 97.1 F | WEIGHT: 208.8 LBS | OXYGEN SATURATION: 100 % | DIASTOLIC BLOOD PRESSURE: 88 MMHG

## 2025-02-18 DIAGNOSIS — I10 HYPERTENSION, UNSPECIFIED TYPE: ICD-10-CM

## 2025-02-18 DIAGNOSIS — Z12.31 ENCOUNTER FOR SCREENING MAMMOGRAM FOR BREAST CANCER: Primary | ICD-10-CM

## 2025-02-18 DIAGNOSIS — I63.9 ACUTE ISCHEMIC STROKE (HCC): ICD-10-CM

## 2025-02-18 DIAGNOSIS — Z12.11 COLON CANCER SCREENING: ICD-10-CM

## 2025-02-18 SDOH — ECONOMIC STABILITY: FOOD INSECURITY: WITHIN THE PAST 12 MONTHS, THE FOOD YOU BOUGHT JUST DIDN'T LAST AND YOU DIDN'T HAVE MONEY TO GET MORE.: NEVER TRUE

## 2025-02-18 SDOH — ECONOMIC STABILITY: FOOD INSECURITY: WITHIN THE PAST 12 MONTHS, YOU WORRIED THAT YOUR FOOD WOULD RUN OUT BEFORE YOU GOT MONEY TO BUY MORE.: NEVER TRUE

## 2025-02-18 ASSESSMENT — ENCOUNTER SYMPTOMS
WHEEZING: 0
DIARRHEA: 0
SHORTNESS OF BREATH: 0
TROUBLE SWALLOWING: 0
SORE THROAT: 0
VOMITING: 0
COUGH: 0
CHEST TIGHTNESS: 0
NAUSEA: 0
SINUS PRESSURE: 0
ABDOMINAL PAIN: 0
RHINORRHEA: 0
FACIAL SWELLING: 0

## 2025-02-18 ASSESSMENT — PATIENT HEALTH QUESTIONNAIRE - PHQ9
2. FEELING DOWN, DEPRESSED OR HOPELESS: NOT AT ALL
SUM OF ALL RESPONSES TO PHQ QUESTIONS 1-9: 0
1. LITTLE INTEREST OR PLEASURE IN DOING THINGS: NOT AT ALL
SUM OF ALL RESPONSES TO PHQ9 QUESTIONS 1 & 2: 0

## 2025-02-18 NOTE — PROGRESS NOTES
2025     Dona Meyers (:  1970) is a 54 y.o. female, here for evaluation of the following medical concerns:    Patient presented to the clinic with her son to discuss several chronic medical conditions.      1.  CVA- patient is needing referral to new PT due to insurance not covering rehab at this itme. , patient continues with outpatient PT/OT and believes she has improved, no longer is restricted to wheelchair or a cane, patient is still limited on her left side particularly left hand and arm, arm is in sling today for protection, communication has improved.  She follows with Dr. Rangel, Physical Medicine and Rehab.  Patient continues off from work and understands her limitations at this time.  She does have a backup plan if she is unable to return which in my opinion is likely.      2.  HTN-  controlled at this time, much improved, taking medication as prescribed, no side effects reported from the medication. today, denied headache, vision change, or dizziness.  WHITE COAT SYNDROME     3.  Allergic rhinitis- rhinorrhea is better controledand would like an antihistamine due to the continued nasal drip.  The Claritin is working.      4.  Colonoscopy needed- never has had this. She understands and is planning later this year.      Today, she denied chest pain, sob, n, v, or diarrhea.     Review of Systems   Constitutional:  Negative for activity change, fatigue, fever and unexpected weight change.   HENT:  Negative for congestion, ear pain, facial swelling, mouth sores, rhinorrhea, sinus pressure, sore throat and trouble swallowing.    Eyes:  Negative for visual disturbance.   Respiratory:  Negative for cough, chest tightness, shortness of breath and wheezing.    Cardiovascular:  Negative for chest pain and leg swelling.   Gastrointestinal:  Negative for abdominal pain, diarrhea, nausea and vomiting.   Musculoskeletal:  Positive for arthralgias and joint swelling.   Skin:  Negative for rash.

## 2025-04-21 RX ORDER — CLOPIDOGREL BISULFATE 75 MG/1
75 TABLET ORAL DAILY
Qty: 90 TABLET | Refills: 2 | Status: SHIPPED | OUTPATIENT
Start: 2025-04-21

## 2025-04-21 RX ORDER — PANTOPRAZOLE SODIUM 40 MG/1
40 TABLET, DELAYED RELEASE ORAL
Qty: 90 TABLET | Refills: 2 | Status: SHIPPED | OUTPATIENT
Start: 2025-04-21

## 2025-04-21 RX ORDER — CLONIDINE HYDROCHLORIDE 0.2 MG/1
0.2 TABLET ORAL 2 TIMES DAILY
Qty: 180 TABLET | Refills: 0 | Status: SHIPPED | OUTPATIENT
Start: 2025-04-21

## 2025-04-21 RX ORDER — LOSARTAN POTASSIUM AND HYDROCHLOROTHIAZIDE 12.5; 5 MG/1; MG/1
1 TABLET ORAL DAILY
Qty: 90 TABLET | Refills: 2 | Status: SHIPPED | OUTPATIENT
Start: 2025-04-21

## 2025-04-21 NOTE — TELEPHONE ENCOUNTER
Last office visit 7/23/2024       Next office visit scheduled Visit date not found    Requested Prescriptions     Pending Prescriptions Disp Refills    cloNIDine (CATAPRES) 0.2 MG tablet [Pharmacy Med Name: cloNIDine HCL 0.2MG TABLET] 180 tablet 0     Sig: TAKE 1 TABLET BY MOUTH 2 TIMES A DAY    clopidogrel (PLAVIX) 75 MG tablet [Pharmacy Med Name: CLOPIDOGREL 75 MG TABLET] 90 tablet 2     Sig: Take 1 tablet by mouth daily    pantoprazole (PROTONIX) 40 MG tablet [Pharmacy Med Name: PANTOPRAZOLE SOD DR 40 MG TAB] 90 tablet 2     Sig: TAKE ONE TABLET BY MOUTH EVERY MORNING BEFORE BREAKFAST    losartan-hydroCHLOROthiazide (HYZAAR) 50-12.5 MG per tablet [Pharmacy Med Name: LOSARTAN-HCTZ 50-12.5 MG TAB] 90 tablet 2     Sig: Take 1 tablet by mouth daily

## 2025-04-24 LAB — NONINV COLON CA DNA+OCC BLD SCRN STL QL: NEGATIVE

## 2025-04-25 ENCOUNTER — RESULTS FOLLOW-UP (OUTPATIENT)
Dept: PRIMARY CARE CLINIC | Age: 55
End: 2025-04-25

## 2025-07-18 RX ORDER — ATORVASTATIN CALCIUM 80 MG/1
80 TABLET, FILM COATED ORAL NIGHTLY
Qty: 90 TABLET | Refills: 1 | Status: SHIPPED | OUTPATIENT
Start: 2025-07-18

## 2025-07-18 RX ORDER — CLONIDINE HYDROCHLORIDE 0.2 MG/1
0.2 TABLET ORAL 2 TIMES DAILY
Qty: 180 TABLET | Refills: 0 | Status: SHIPPED | OUTPATIENT
Start: 2025-07-18

## 2025-08-21 ENCOUNTER — TELEMEDICINE (OUTPATIENT)
Dept: PRIMARY CARE CLINIC | Age: 55
End: 2025-08-21

## 2025-08-21 DIAGNOSIS — I10 HYPERTENSION, UNSPECIFIED TYPE: ICD-10-CM

## 2025-08-21 DIAGNOSIS — Z00.00 INITIAL MEDICARE ANNUAL WELLNESS VISIT: Primary | ICD-10-CM

## 2025-08-21 DIAGNOSIS — I63.9 ACUTE ISCHEMIC STROKE (HCC): ICD-10-CM

## 2025-08-21 ASSESSMENT — PATIENT HEALTH QUESTIONNAIRE - PHQ9
SUM OF ALL RESPONSES TO PHQ QUESTIONS 1-9: 0
1. LITTLE INTEREST OR PLEASURE IN DOING THINGS: NOT AT ALL
SUM OF ALL RESPONSES TO PHQ QUESTIONS 1-9: 0
2. FEELING DOWN, DEPRESSED OR HOPELESS: NOT AT ALL

## 2025-08-21 ASSESSMENT — LIFESTYLE VARIABLES
HOW OFTEN DO YOU HAVE A DRINK CONTAINING ALCOHOL: NEVER
HOW MANY STANDARD DRINKS CONTAINING ALCOHOL DO YOU HAVE ON A TYPICAL DAY: PATIENT DOES NOT DRINK

## 2025-08-23 ASSESSMENT — ENCOUNTER SYMPTOMS
ABDOMINAL PAIN: 0
DIARRHEA: 0
SINUS PRESSURE: 0
FACIAL SWELLING: 0
NAUSEA: 0
TROUBLE SWALLOWING: 0
SHORTNESS OF BREATH: 0
SORE THROAT: 0
COUGH: 0
VOMITING: 0
RHINORRHEA: 0
WHEEZING: 0
CHEST TIGHTNESS: 0